# Patient Record
Sex: FEMALE | Race: BLACK OR AFRICAN AMERICAN | NOT HISPANIC OR LATINO
[De-identification: names, ages, dates, MRNs, and addresses within clinical notes are randomized per-mention and may not be internally consistent; named-entity substitution may affect disease eponyms.]

---

## 2020-06-24 ENCOUNTER — LABORATORY RESULT (OUTPATIENT)
Age: 75
End: 2020-06-24

## 2020-06-24 ENCOUNTER — APPOINTMENT (OUTPATIENT)
Dept: DERMATOLOGY | Facility: CLINIC | Age: 75
End: 2020-06-24
Payer: MEDICARE

## 2020-06-24 VITALS — BODY MASS INDEX: 24.99 KG/M2 | WEIGHT: 150 LBS | HEIGHT: 65 IN

## 2020-06-24 VITALS — TEMPERATURE: 97.5 F

## 2020-06-24 DIAGNOSIS — D48.5 NEOPLASM OF UNCERTAIN BEHAVIOR OF SKIN: ICD-10-CM

## 2020-06-24 PROBLEM — Z00.00 ENCOUNTER FOR PREVENTIVE HEALTH EXAMINATION: Status: ACTIVE | Noted: 2020-06-24

## 2020-06-24 PROCEDURE — 99203 OFFICE O/P NEW LOW 30 MIN: CPT | Mod: 25

## 2020-06-24 PROCEDURE — 11102 TANGNTL BX SKIN SINGLE LES: CPT

## 2020-07-01 LAB
ALBUMIN SERPL ELPH-MCNC: 4 G/DL
ALP BLD-CCNC: 119 U/L
ALT SERPL-CCNC: 61 U/L
ANION GAP SERPL CALC-SCNC: 12 MMOL/L
AST SERPL-CCNC: 110 U/L
BASOPHILS # BLD AUTO: 0.03 K/UL
BASOPHILS NFR BLD AUTO: 0.9 %
BILIRUB SERPL-MCNC: 1.2 MG/DL
BUN SERPL-MCNC: 9 MG/DL
CALCIUM SERPL-MCNC: 9.4 MG/DL
CHLORIDE SERPL-SCNC: 105 MMOL/L
CO2 SERPL-SCNC: 25 MMOL/L
CREAT SERPL-MCNC: 0.68 MG/DL
EOSINOPHIL # BLD AUTO: 0.03 K/UL
EOSINOPHIL NFR BLD AUTO: 0.9 %
GLUCOSE SERPL-MCNC: 108 MG/DL
HAV IGM SER QL: NONREACTIVE
HBV CORE IGM SER QL: NONREACTIVE
HBV SURFACE AG SER QL: NONREACTIVE
HCT VFR BLD CALC: 40.7 %
HCV AB SER QL: REACTIVE
HCV RNA SERPL NAA DL=5-ACNC: ABNORMAL IU/ML
HCV RNA SERPL NAA+PROBE-LOG IU: 5.82 LOG10IU/ML
HCV S/CO RATIO: 16.73 S/CO
HGB BLD-MCNC: 13 G/DL
HIV1+2 AB SPEC QL IA.RAPID: NONREACTIVE
IMM GRANULOCYTES NFR BLD AUTO: 0.6 %
LYMPHOCYTES # BLD AUTO: 1.25 K/UL
LYMPHOCYTES NFR BLD AUTO: 39.1 %
M TB IFN-G BLD-IMP: NEGATIVE
MAN DIFF?: NORMAL
MCHC RBC-ENTMCNC: 29.3 PG
MCHC RBC-ENTMCNC: 31.9 GM/DL
MCV RBC AUTO: 91.9 FL
MONOCYTES # BLD AUTO: 0.32 K/UL
MONOCYTES NFR BLD AUTO: 10 %
NEUTROPHILS # BLD AUTO: 1.55 K/UL
NEUTROPHILS NFR BLD AUTO: 48.5 %
PLATELET # BLD AUTO: 88 K/UL
POTASSIUM SERPL-SCNC: 4.3 MMOL/L
PROT SERPL-MCNC: 8.4 G/DL
QUANTIFERON TB PLUS MITOGEN MINUS NIL: 7.41 IU/ML
QUANTIFERON TB PLUS NIL: 0.03 IU/ML
QUANTIFERON TB PLUS TB1 MINUS NIL: 0.12 IU/ML
QUANTIFERON TB PLUS TB2 MINUS NIL: 0.12 IU/ML
RBC # BLD: 4.43 M/UL
RBC # FLD: 15.3 %
SODIUM SERPL-SCNC: 141 MMOL/L
WBC # FLD AUTO: 3.2 K/UL

## 2020-07-10 ENCOUNTER — APPOINTMENT (OUTPATIENT)
Dept: GASTROENTEROLOGY | Facility: CLINIC | Age: 75
End: 2020-07-10
Payer: MEDICARE

## 2020-07-10 VITALS
SYSTOLIC BLOOD PRESSURE: 149 MMHG | HEIGHT: 65 IN | WEIGHT: 156 LBS | TEMPERATURE: 98.3 F | DIASTOLIC BLOOD PRESSURE: 86 MMHG | BODY MASS INDEX: 25.99 KG/M2 | HEART RATE: 109 BPM

## 2020-07-10 DIAGNOSIS — E66.3 OVERWEIGHT: ICD-10-CM

## 2020-07-10 PROCEDURE — 82274 ASSAY TEST FOR BLOOD FECAL: CPT | Mod: QW

## 2020-07-10 PROCEDURE — 99204 OFFICE O/P NEW MOD 45 MIN: CPT

## 2020-07-10 NOTE — REVIEW OF SYSTEMS
[Itching] : itching [Negative] : Endocrine [As Noted in HPI] : as noted in HPI [Heartburn] : no heartburn
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
## 2020-07-10 NOTE — ASSESSMENT
[FreeTextEntry1] : 1.  Chronic hepatitis C with leg rashes (?leukocytoclastic vasculitis, cryoglobulinemia, etc.) and thrombocytopenia, hyperglobulinemia--I suspect underlying cirrhosis.  At increased risk for fatty liver, hepatocellular carcinoma.\par 2.  Rule out colorectal neoplasia; if cirrhosis, rule out varices.  Stool guaiac negative with negative fecal immunochemical test on today's exam.\par 3.  Overweight.\par 4.  Hypertension.\par 5.  Status post vaginal cyst excision.\par \par Plan:\par 1.  Medical records reviewed.\par 2.  Abdominal ultrasound.\par 3.  Formal evaluation by full-time Hepatology after ultrasound, for consideration of antiviral therapy, etc. Further bloodwork to be drawn by Hepatology, as needed.\par 4.  At some point, consider screening colonoscopy, and may require EGD as well (if cirrhosis is documented).\par 5.  If no evidence of cirrhosis, consider formal Hematology evaluation.

## 2020-07-10 NOTE — CONSULT LETTER
[Dear  ___] : Dear  [unfilled], [Consult Letter:] : I had the pleasure of evaluating your patient, [unfilled]. [Please see my note below.] : Please see my note below. [Sincerely,] : Sincerely, [Consult Closing:] : Thank you very much for allowing me to participate in the care of this patient.  If you have any questions, please do not hesitate to contact me. [FreeTextEntry3] : Deondre Chiang M.D.\par  [DrVa  ___] : Dr. HILLMAN

## 2020-07-10 NOTE — PHYSICAL EXAM
[General Appearance - In No Acute Distress] : in no acute distress [General Appearance - Alert] : alert [General Appearance - Well Nourished] : well nourished [General Appearance - Well Developed] : well developed [Sclera] : the sclera and conjunctiva were normal [Neck Appearance] : the appearance of the neck was normal [Thyroid Diffuse Enlargement] : the thyroid was not enlarged [Neck Cervical Mass (___cm)] : no neck mass was observed [Jugular Venous Distention Increased] : there was no jugular-venous distention [Thyroid Nodule] : there were no palpable thyroid nodules [Auscultation Breath Sounds / Voice Sounds] : lungs were clear to auscultation bilaterally [Heart Rate And Rhythm] : heart rate was normal and rhythm regular [Heart Sounds] : normal S1 and S2 [Heart Sounds Gallop] : no gallops [Heart Sounds Pericardial Friction Rub] : no pericardial rub [Full Pulse] : the pedal pulses are present [Edema] : there was no peripheral edema [Abdomen Tenderness] : non-tender [Bowel Sounds] : normal bowel sounds [Abdomen Soft] : soft [Normal Sphincter Tone] : normal sphincter tone [Abdomen Mass (___ Cm)] : no abdominal mass palpated [] : no hepato-splenomegaly [Internal Hemorrhoid] : no internal hemorrhoids [External Hemorrhoid] : no external hemorrhoids [No Rectal Mass] : no rectal mass [Occult Blood Positive] : stool was negative for occult blood [Axillary Lymph Nodes Enlarged Bilaterally] : axillary [Supraclavicular Lymph Nodes Enlarged Bilaterally] : supraclavicular [Cervical Lymph Nodes Enlarged Anterior Bilaterally] : anterior cervical [Cervical Lymph Nodes Enlarged Posterior Bilaterally] : posterior cervical [Nail Clubbing] : no clubbing  or cyanosis of the fingernails [Femoral Lymph Nodes Enlarged Bilaterally] : femoral [Inguinal Lymph Nodes Enlarged Bilaterally] : inguinal [Musculoskeletal - Swelling] : no joint swelling seen [FreeTextEntry1] : violaceous papules on left leg; bandage on right shin (raised edges) [Affect] : the affect was normal [Oriented To Time, Place, And Person] : oriented to person, place, and time [Impaired Insight] : insight and judgment were intact

## 2020-08-04 ENCOUNTER — APPOINTMENT (OUTPATIENT)
Dept: HEPATOLOGY | Facility: CLINIC | Age: 75
End: 2020-08-04
Payer: MEDICARE

## 2020-08-04 VITALS
TEMPERATURE: 97 F | SYSTOLIC BLOOD PRESSURE: 114 MMHG | HEIGHT: 65 IN | WEIGHT: 155 LBS | BODY MASS INDEX: 25.83 KG/M2 | HEART RATE: 130 BPM | DIASTOLIC BLOOD PRESSURE: 76 MMHG

## 2020-08-04 DIAGNOSIS — R21 RASH AND OTHER NONSPECIFIC SKIN ERUPTION: ICD-10-CM

## 2020-08-04 PROCEDURE — 99204 OFFICE O/P NEW MOD 45 MIN: CPT

## 2020-08-04 PROCEDURE — 99214 OFFICE O/P EST MOD 30 MIN: CPT

## 2020-08-06 LAB
ALBUMIN SERPL ELPH-MCNC: 4.2 G/DL
ALP BLD-CCNC: 116 U/L
ALT SERPL-CCNC: 46 U/L
ANION GAP SERPL CALC-SCNC: 12 MMOL/L
AST SERPL-CCNC: 83 U/L
BASOPHILS # BLD AUTO: 0.02 K/UL
BASOPHILS NFR BLD AUTO: 0.5 %
BILIRUB SERPL-MCNC: 1.4 MG/DL
BUN SERPL-MCNC: 8 MG/DL
CALCIUM SERPL-MCNC: 9.4 MG/DL
CHLORIDE SERPL-SCNC: 105 MMOL/L
CO2 SERPL-SCNC: 25 MMOL/L
CREAT SERPL-MCNC: 0.74 MG/DL
EOSINOPHIL # BLD AUTO: 0.03 K/UL
EOSINOPHIL NFR BLD AUTO: 0.7 %
GLUCOSE SERPL-MCNC: 102 MG/DL
HBV CORE IGG+IGM SER QL: NONREACTIVE
HBV SURFACE AB SER QL: NONREACTIVE
HBV SURFACE AG SER QL: NONREACTIVE
HCT VFR BLD CALC: 42.2 %
HEPATITIS A IGG ANTIBODY: REACTIVE
HGB BLD-MCNC: 13.2 G/DL
IMM GRANULOCYTES NFR BLD AUTO: 0.2 %
INR PPP: 1.28 RATIO
LYMPHOCYTES # BLD AUTO: 1.33 K/UL
LYMPHOCYTES NFR BLD AUTO: 32.1 %
MAN DIFF?: NORMAL
MCHC RBC-ENTMCNC: 29.4 PG
MCHC RBC-ENTMCNC: 31.3 GM/DL
MCV RBC AUTO: 94 FL
MONOCYTES # BLD AUTO: 0.33 K/UL
MONOCYTES NFR BLD AUTO: 8 %
NEUTROPHILS # BLD AUTO: 2.42 K/UL
NEUTROPHILS NFR BLD AUTO: 58.5 %
PLATELET # BLD AUTO: 100 K/UL
POTASSIUM SERPL-SCNC: 3.9 MMOL/L
PROT SERPL-MCNC: 8.7 G/DL
PT BLD: 14.9 SEC
RBC # BLD: 4.49 M/UL
RBC # FLD: 14.3 %
SODIUM SERPL-SCNC: 142 MMOL/L
WBC # FLD AUTO: 4.14 K/UL

## 2020-08-10 LAB
AFPL3 RESULTS RECEIVED: NORMAL
ALPHA-1-FETOPROTEIN-L3: 12.8 %
ALPHA-1-FETOPROTEIN: 102 NG/ML

## 2020-08-18 ENCOUNTER — OUTPATIENT (OUTPATIENT)
Dept: OUTPATIENT SERVICES | Facility: HOSPITAL | Age: 75
LOS: 1 days | End: 2020-08-18
Payer: MEDICARE

## 2020-08-18 ENCOUNTER — APPOINTMENT (OUTPATIENT)
Dept: MRI IMAGING | Facility: IMAGING CENTER | Age: 75
End: 2020-08-18
Payer: MEDICARE

## 2020-08-18 DIAGNOSIS — B19.20 UNSPECIFIED VIRAL HEPATITIS C WITHOUT HEPATIC COMA: ICD-10-CM

## 2020-08-18 PROCEDURE — 74183 MRI ABD W/O CNTR FLWD CNTR: CPT | Mod: 26

## 2020-08-18 PROCEDURE — A9585: CPT

## 2020-08-18 PROCEDURE — 74183 MRI ABD W/O CNTR FLWD CNTR: CPT

## 2020-08-19 ENCOUNTER — APPOINTMENT (OUTPATIENT)
Dept: MRI IMAGING | Facility: IMAGING CENTER | Age: 75
End: 2020-08-19

## 2020-08-20 LAB — HCV GENTYP BLD NAA+PROBE: NORMAL

## 2020-08-21 ENCOUNTER — OUTPATIENT (OUTPATIENT)
Dept: OUTPATIENT SERVICES | Facility: HOSPITAL | Age: 75
LOS: 1 days | End: 2020-08-21
Payer: MEDICARE

## 2020-08-21 ENCOUNTER — APPOINTMENT (OUTPATIENT)
Dept: ULTRASOUND IMAGING | Facility: IMAGING CENTER | Age: 75
End: 2020-08-21
Payer: MEDICARE

## 2020-08-21 DIAGNOSIS — R21 RASH AND OTHER NONSPECIFIC SKIN ERUPTION: ICD-10-CM

## 2020-08-21 PROCEDURE — 76700 US EXAM ABDOM COMPLETE: CPT

## 2020-08-21 PROCEDURE — 76700 US EXAM ABDOM COMPLETE: CPT | Mod: 26

## 2020-08-26 ENCOUNTER — APPOINTMENT (OUTPATIENT)
Dept: HEPATOLOGY | Facility: CLINIC | Age: 75
End: 2020-08-26
Payer: MEDICARE

## 2020-08-26 VITALS
HEIGHT: 65 IN | TEMPERATURE: 96.2 F | RESPIRATION RATE: 14 BRPM | BODY MASS INDEX: 25.83 KG/M2 | HEART RATE: 105 BPM | DIASTOLIC BLOOD PRESSURE: 83 MMHG | SYSTOLIC BLOOD PRESSURE: 134 MMHG | WEIGHT: 155 LBS

## 2020-08-26 PROCEDURE — 91200 LIVER ELASTOGRAPHY: CPT

## 2020-08-26 PROCEDURE — 99214 OFFICE O/P EST MOD 30 MIN: CPT

## 2020-08-26 NOTE — PHYSICAL EXAM
[General Appearance - Alert] : alert [General Appearance - In No Acute Distress] : in no acute distress [Sclera] : the sclera and conjunctiva were normal [PERRL With Normal Accommodation] : pupils were equal in size, round, and reactive to light [Extraocular Movements] : extraocular movements were intact [Outer Ear] : the ears and nose were normal in appearance [Oropharynx] : the oropharynx was normal [Neck Appearance] : the appearance of the neck was normal [Neck Cervical Mass (___cm)] : no neck mass was observed [Jugular Venous Distention Increased] : there was no jugular-venous distention [Thyroid Diffuse Enlargement] : the thyroid was not enlarged [Thyroid Nodule] : there were no palpable thyroid nodules [Heart Rate And Rhythm] : heart rate was normal and rhythm regular [Auscultation Breath Sounds / Voice Sounds] : lungs were clear to auscultation bilaterally [Heart Sounds] : normal S1 and S2 [Heart Sounds Gallop] : no gallops [Murmurs] : no murmurs [Heart Sounds Pericardial Friction Rub] : no pericardial rub [Abdomen Soft] : soft [Abdomen Tenderness] : non-tender [Bowel Sounds] : normal bowel sounds [] : no hepato-splenomegaly [No CVA Tenderness] : no ~M costovertebral angle tenderness [Abdomen Mass (___ Cm)] : no abdominal mass palpated [No Spinal Tenderness] : no spinal tenderness [Abnormal Walk] : normal gait [Nail Clubbing] : no clubbing  or cyanosis of the fingernails [Motor Tone] : muscle strength and tone were normal [Musculoskeletal - Swelling] : no joint swelling seen [No Focal Deficits] : no focal deficits [Deep Tendon Reflexes (DTR)] : deep tendon reflexes were 2+ and symmetric [Sensation] : the sensory exam was normal to light touch and pinprick [Impaired Insight] : insight and judgment were intact [Oriented To Time, Place, And Person] : oriented to person, place, and time [Affect] : the affect was normal [FreeTextEntry1] : Multiple lichenified skin rash in both lower and upper extremities
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
## 2020-08-26 NOTE — ASSESSMENT
[FreeTextEntry1] : 74 yrs old Female with chronic hepatitis C, and recent new onset skin rash in both lower and upper extremities ( ? PCT vs lichen planus). Fibroscan shows CAP score of 100, and median liver stiffness score of 19.9 suggesting cirrhosis of the liver. Her MRI and US both shows a  7.5 cm right hepatic lobe lesion compatible with with hepatocellular carcinoma (LI-RADS 5). Partially exophytic nodular extension of tumor versus smaller satellite lesions. \par \par \par PLAN\par \par I discussed the meaning of cirrhosis with the patient. I reviewed the natural history of the disease. I explained the risks for the development of esophageal varices with and without bleeding, hepatic encephalopathy, ascites, hepatocellular carcinoma, and liver failure. I have explained that disease can progress to the point of requiring an evaluation for liver transplantation. I have explained the need for imaging every 6 months to screen for liver cancer.\par \par I have discussed potential implication related to his diagnosis of HCC (hepatocellular carcinoma). I have reviewed the natural history of hepatocellular carcinoma. I have discussed potential therapeutic options for treatment of hepatocellular carcinoma including liver directed therapies [such as TACE ( transarterial chemoembolization), RFA ( radiofrequency ablation), TARE(transarterial radioembolization)], liver transplantation, as well as potential chemotherapeutic options. I have also explained that treatment options the patient specific, and some patient's not be a reasonable for particular therapeutic interventions. Additionally, if liver transplantation is considered, patient must fulfill certain criteria and be approved by the selection committee in order to be placed in the transplant list.\par \par At this point I will defer treatment of her HCV as she remains at high risk of relapse.\par \par I have referred to Dr. Ramirez to discuss liver targeted therapy option. However, I will prefer surgical resection.  I have discussed with Dr. Domo De Guzman, and he agrees to evaluate for potential surgical resection after an HVPHG measurement.  I will request Dr. Chung to have this scheduled. She will also see Dr. Nilda Carreno, Oncologist on 9/11/20. \par \par -Initiate HBV vaccine series. \par \par \par RTC in 4 weeks.

## 2020-08-26 NOTE — REVIEW OF SYSTEMS
[Negative] : Heme/Lymph [de-identified] : both lower and upper extremities [Skin Lesions] : no skin lesions

## 2020-08-28 ENCOUNTER — APPOINTMENT (OUTPATIENT)
Dept: INTERVENTIONAL RADIOLOGY/VASCULAR | Facility: CLINIC | Age: 75
End: 2020-08-28
Payer: MEDICARE

## 2020-08-28 DIAGNOSIS — Z63.4 DISAPPEARANCE AND DEATH OF FAMILY MEMBER: ICD-10-CM

## 2020-08-28 DIAGNOSIS — Z78.9 OTHER SPECIFIED HEALTH STATUS: ICD-10-CM

## 2020-08-28 PROCEDURE — 99204 OFFICE O/P NEW MOD 45 MIN: CPT | Mod: 95

## 2020-08-28 RX ORDER — AMLODIPINE BESYLATE 5 MG/1
5 TABLET ORAL DAILY
Refills: 0 | Status: ACTIVE | COMMUNITY

## 2020-08-28 RX ORDER — POLYETHYLENE GLYCOL 3350 AND ELECTROLYTES WITH LEMON FLAVOR 236; 22.74; 6.74; 5.86; 2.97 G/4L; G/4L; G/4L; G/4L; G/4L
236 POWDER, FOR SOLUTION ORAL
Qty: 1 | Refills: 0 | Status: DISCONTINUED | COMMUNITY
Start: 2020-08-26 | End: 2020-08-28

## 2020-08-28 RX ORDER — LISINOPRIL 10 MG/1
10 TABLET ORAL DAILY
Refills: 0 | Status: ACTIVE | COMMUNITY

## 2020-08-28 SDOH — SOCIAL STABILITY - SOCIAL INSECURITY: DISSAPEARANCE AND DEATH OF FAMILY MEMBER: Z63.4

## 2020-09-10 ENCOUNTER — OUTPATIENT (OUTPATIENT)
Dept: OUTPATIENT SERVICES | Facility: HOSPITAL | Age: 75
LOS: 1 days | Discharge: HOME | End: 2020-09-10

## 2020-09-10 DIAGNOSIS — C22.0 LIVER CELL CARCINOMA: ICD-10-CM

## 2020-09-11 ENCOUNTER — RESULT REVIEW (OUTPATIENT)
Age: 75
End: 2020-09-11

## 2020-09-11 ENCOUNTER — APPOINTMENT (OUTPATIENT)
Age: 75
End: 2020-09-11
Payer: MEDICARE

## 2020-09-11 VITALS
RESPIRATION RATE: 16 BRPM | DIASTOLIC BLOOD PRESSURE: 86 MMHG | WEIGHT: 154.96 LBS | BODY MASS INDEX: 26.46 KG/M2 | TEMPERATURE: 98.3 F | HEART RATE: 97 BPM | SYSTOLIC BLOOD PRESSURE: 133 MMHG | OXYGEN SATURATION: 100 % | HEIGHT: 64.33 IN

## 2020-09-11 DIAGNOSIS — I10 ESSENTIAL (PRIMARY) HYPERTENSION: ICD-10-CM

## 2020-09-11 LAB
BASOPHILS # BLD AUTO: 0.03 K/UL — SIGNIFICANT CHANGE UP (ref 0–0.2)
BASOPHILS NFR BLD AUTO: 0.7 % — SIGNIFICANT CHANGE UP (ref 0–2)
EOSINOPHIL # BLD AUTO: 0.05 K/UL — SIGNIFICANT CHANGE UP (ref 0–0.5)
EOSINOPHIL NFR BLD AUTO: 1.2 % — SIGNIFICANT CHANGE UP (ref 0–6)
HCT VFR BLD CALC: 40.7 % — SIGNIFICANT CHANGE UP (ref 34.5–45)
HGB BLD-MCNC: 13.4 G/DL — SIGNIFICANT CHANGE UP (ref 11.5–15.5)
IMM GRANULOCYTES NFR BLD AUTO: 0.2 % — SIGNIFICANT CHANGE UP (ref 0–1.5)
LYMPHOCYTES # BLD AUTO: 1.31 K/UL — SIGNIFICANT CHANGE UP (ref 1–3.3)
LYMPHOCYTES # BLD AUTO: 30.2 % — SIGNIFICANT CHANGE UP (ref 13–44)
MCHC RBC-ENTMCNC: 30 PG — SIGNIFICANT CHANGE UP (ref 27–34)
MCHC RBC-ENTMCNC: 32.9 G/DL — SIGNIFICANT CHANGE UP (ref 32–36)
MCV RBC AUTO: 91.1 FL — SIGNIFICANT CHANGE UP (ref 80–100)
MONOCYTES # BLD AUTO: 0.33 K/UL — SIGNIFICANT CHANGE UP (ref 0–0.9)
MONOCYTES NFR BLD AUTO: 7.6 % — SIGNIFICANT CHANGE UP (ref 2–14)
NEUTROPHILS # BLD AUTO: 2.61 K/UL — SIGNIFICANT CHANGE UP (ref 1.8–7.4)
NEUTROPHILS NFR BLD AUTO: 60.1 % — SIGNIFICANT CHANGE UP (ref 43–77)
NRBC # BLD: 0 /100 WBCS — SIGNIFICANT CHANGE UP (ref 0–0)
PLATELET # BLD AUTO: 116 K/UL — LOW (ref 150–400)
RBC # BLD: 4.47 M/UL — SIGNIFICANT CHANGE UP (ref 3.8–5.2)
RBC # FLD: 13.6 % — SIGNIFICANT CHANGE UP (ref 10.3–14.5)
WBC # BLD: 4.34 K/UL — SIGNIFICANT CHANGE UP (ref 3.8–10.5)
WBC # FLD AUTO: 4.34 K/UL — SIGNIFICANT CHANGE UP (ref 3.8–10.5)

## 2020-09-11 PROCEDURE — 99205 OFFICE O/P NEW HI 60 MIN: CPT

## 2020-09-12 LAB
AFP-TM SERPL-MCNC: 100 NG/ML
ALBUMIN SERPL ELPH-MCNC: 3.8 G/DL
ALP BLD-CCNC: 112 U/L
ALT SERPL-CCNC: 42 U/L
ANION GAP SERPL CALC-SCNC: 12 MMOL/L
APTT BLD: 36.2 SEC
AST SERPL-CCNC: 79 U/L
BILIRUB SERPL-MCNC: 1.3 MG/DL
BUN SERPL-MCNC: 8 MG/DL
CALCIUM SERPL-MCNC: 9.5 MG/DL
CHLORIDE SERPL-SCNC: 103 MMOL/L
CO2 SERPL-SCNC: 24 MMOL/L
CREAT SERPL-MCNC: 0.71 MG/DL
GLUCOSE SERPL-MCNC: 106 MG/DL
INR PPP: 1.19 RATIO
POTASSIUM SERPL-SCNC: 4.5 MMOL/L
PROT SERPL-MCNC: 8.8 G/DL
PT BLD: 13.9 SEC
SODIUM SERPL-SCNC: 139 MMOL/L

## 2020-09-16 ENCOUNTER — APPOINTMENT (OUTPATIENT)
Dept: DERMATOLOGY | Facility: CLINIC | Age: 75
End: 2020-09-16
Payer: MEDICARE

## 2020-09-16 DIAGNOSIS — L43.9 LICHEN PLANUS, UNSPECIFIED: ICD-10-CM

## 2020-09-16 DIAGNOSIS — L81.0 POSTINFLAMMATORY HYPERPIGMENTATION: ICD-10-CM

## 2020-09-16 PROCEDURE — 99214 OFFICE O/P EST MOD 30 MIN: CPT

## 2020-09-17 ENCOUNTER — APPOINTMENT (OUTPATIENT)
Dept: NUCLEAR MEDICINE | Facility: IMAGING CENTER | Age: 75
End: 2020-09-17
Payer: MEDICARE

## 2020-09-17 ENCOUNTER — APPOINTMENT (OUTPATIENT)
Dept: CT IMAGING | Facility: IMAGING CENTER | Age: 75
End: 2020-09-17
Payer: MEDICARE

## 2020-09-17 ENCOUNTER — RESULT REVIEW (OUTPATIENT)
Age: 75
End: 2020-09-17

## 2020-09-17 ENCOUNTER — OUTPATIENT (OUTPATIENT)
Dept: OUTPATIENT SERVICES | Facility: HOSPITAL | Age: 75
LOS: 1 days | End: 2020-09-17
Payer: MEDICARE

## 2020-09-17 DIAGNOSIS — Z00.8 ENCOUNTER FOR OTHER GENERAL EXAMINATION: ICD-10-CM

## 2020-09-17 DIAGNOSIS — C22.0 LIVER CELL CARCINOMA: ICD-10-CM

## 2020-09-17 PROBLEM — L81.0 POST-INFLAMMATORY HYPERPIGMENTATION: Status: ACTIVE | Noted: 2020-09-17

## 2020-09-17 PROCEDURE — 78830 RP LOCLZJ TUM SPECT W/CT 1: CPT | Mod: 26

## 2020-09-17 PROCEDURE — 71250 CT THORAX DX C-: CPT | Mod: 26

## 2020-09-17 PROCEDURE — 78830 RP LOCLZJ TUM SPECT W/CT 1: CPT

## 2020-09-17 PROCEDURE — 78306 BONE IMAGING WHOLE BODY: CPT | Mod: 26

## 2020-09-17 PROCEDURE — 78306 BONE IMAGING WHOLE BODY: CPT

## 2020-09-17 PROCEDURE — 71250 CT THORAX DX C-: CPT

## 2020-09-17 PROCEDURE — A9561: CPT

## 2020-09-26 DIAGNOSIS — Z01.818 ENCOUNTER FOR OTHER PREPROCEDURAL EXAMINATION: ICD-10-CM

## 2020-09-28 ENCOUNTER — APPOINTMENT (OUTPATIENT)
Dept: DISASTER EMERGENCY | Facility: CLINIC | Age: 75
End: 2020-09-28

## 2020-09-28 DIAGNOSIS — Z01.818 ENCOUNTER FOR OTHER PREPROCEDURAL EXAMINATION: ICD-10-CM

## 2020-09-29 LAB — SARS-COV-2 N GENE NPH QL NAA+PROBE: NOT DETECTED

## 2020-09-30 ENCOUNTER — APPOINTMENT (OUTPATIENT)
Dept: HEPATOLOGY | Facility: HOSPITAL | Age: 75
End: 2020-09-30

## 2020-09-30 ENCOUNTER — OUTPATIENT (OUTPATIENT)
Dept: OUTPATIENT SERVICES | Facility: HOSPITAL | Age: 75
LOS: 1 days | End: 2020-09-30
Payer: MEDICARE

## 2020-09-30 ENCOUNTER — RESULT REVIEW (OUTPATIENT)
Age: 75
End: 2020-09-30

## 2020-09-30 ENCOUNTER — RECORD ABSTRACTING (OUTPATIENT)
Age: 75
End: 2020-09-30

## 2020-09-30 VITALS
RESPIRATION RATE: 16 BRPM | WEIGHT: 156.97 LBS | OXYGEN SATURATION: 99 % | HEIGHT: 64 IN | SYSTOLIC BLOOD PRESSURE: 121 MMHG | TEMPERATURE: 96 F | HEART RATE: 94 BPM | DIASTOLIC BLOOD PRESSURE: 76 MMHG

## 2020-09-30 VITALS
DIASTOLIC BLOOD PRESSURE: 68 MMHG | HEART RATE: 90 BPM | OXYGEN SATURATION: 97 % | SYSTOLIC BLOOD PRESSURE: 116 MMHG | RESPIRATION RATE: 18 BRPM

## 2020-09-30 DIAGNOSIS — N89.8 OTHER SPECIFIED NONINFLAMMATORY DISORDERS OF VAGINA: Chronic | ICD-10-CM

## 2020-09-30 DIAGNOSIS — K74.60 UNSPECIFIED CIRRHOSIS OF LIVER: ICD-10-CM

## 2020-09-30 PROCEDURE — 43239 EGD BIOPSY SINGLE/MULTIPLE: CPT

## 2020-09-30 PROCEDURE — G0121 COLON CA SCRN NOT HI RSK IND: CPT

## 2020-09-30 PROCEDURE — G0121: CPT

## 2020-09-30 RX ORDER — SODIUM CHLORIDE 9 MG/ML
1000 INJECTION INTRAMUSCULAR; INTRAVENOUS; SUBCUTANEOUS
Refills: 0 | Status: DISCONTINUED | OUTPATIENT
Start: 2020-09-30 | End: 2020-10-14

## 2020-09-30 RX ADMIN — SODIUM CHLORIDE 30 MILLILITER(S): 9 INJECTION INTRAMUSCULAR; INTRAVENOUS; SUBCUTANEOUS at 08:08

## 2020-09-30 NOTE — PRE PROCEDURE NOTE - PRE PROCEDURE EVALUATION
Attending Physician:          Dr. Gaytan             Procedure: EGD/Colon    Indication for Procedure: ...   ________________________________________________________  PAST MEDICAL & SURGICAL HISTORY:  Hypertension, unspecified type    Vaginal cyst      ALLERGIES:  No Known Allergies    HOME MEDICATIONS:  amLODIPine: 20 milligram(s) orally once a day  lisinopril 10 mg oral tablet: 1 tab(s) orally once a day    AICD/PPM: [ ] yes   [ ] no    PERTINENT LAB DATA:                      PHYSICAL EXAMINATION:    Height (cm): 162.6  Weight (kg): 71.2  BMI (kg/m2): 26.9  BSA (m2): 1.76T(C): --  HR: --  BP: --  RR: --  SpO2: --    Constitutional: NAD  HEENT: PERRLA, EOMI,    Neck:  No JVD  Respiratory: CTAB/L  Cardiovascular: S1 and S2  Gastrointestinal: BS+, soft, NT/ND  Extremities: No peripheral edema  Neurological: A/O x 3, no focal deficits  Psychiatric: Normal mood, normal affect  Skin: No rashes    ASA Class: I [ ]  II [x ]  III [ ]  IV [ ]    COMMENTS:    The patient is a suitable candidate for the planned procedure unless box checked [ ]  No, explain:     Attending Physician:          Dr. Gaytan             Procedure: EGD/Colon    Indication for Procedure: Colorectal screening, variceal screening  ________________________________________________________  PAST MEDICAL & SURGICAL HISTORY:  Hypertension, unspecified type    Vaginal cyst      ALLERGIES:  No Known Allergies    HOME MEDICATIONS:  amLODIPine: 20 milligram(s) orally once a day  lisinopril 10 mg oral tablet: 1 tab(s) orally once a day    AICD/PPM: [ ] yes   [ ] no    PERTINENT LAB DATA:                      PHYSICAL EXAMINATION:    Height (cm): 162.6  Weight (kg): 71.2  BMI (kg/m2): 26.9  BSA (m2): 1.76T(C): --  HR: --  BP: --  RR: --  SpO2: --    Constitutional: NAD  HEENT: PERRLA, EOMI,    Neck:  No JVD  Respiratory: CTAB/L  Cardiovascular: S1 and S2  Gastrointestinal: BS+, soft, NT/ND  Extremities: No peripheral edema  Neurological: A/O x 3, no focal deficits  Psychiatric: Normal mood, normal affect  Skin: No rashes    ASA Class: I [ ]  II [x ]  III [ ]  IV [ ]    COMMENTS:    The patient is a suitable candidate for the planned procedure unless box checked [ ]  No, explain:

## 2020-09-30 NOTE — ASU PATIENT PROFILE, ADULT - PMH
Hepatic cirrhosis, unspecified hepatic cirrhosis type, unspecified whether ascites present    Hypertension, unspecified type    Thrombocytopenia

## 2020-09-30 NOTE — ASU DISCHARGE PLAN (ADULT/PEDIATRIC) - CARE PROVIDER_API CALL
Lauryn Gaytan (MD)  Gastroenterology; Transplant Hepatology  [address] [place] [address]  Phone: [phone]  Fax: [phone]  Established Patient  Follow Up Time: Routine

## 2020-10-01 PROBLEM — Z01.818 PREOP TESTING: Status: ACTIVE | Noted: 2020-10-01

## 2020-10-03 NOTE — HISTORY OF PRESENT ILLNESS
[Disease: _____________________] : Disease: [unfilled] [de-identified] : 74 F w/ Hepatitis C, cirrhosis presents for management of HCC. \par \par In June 2020, pt developed a rash and sought derm evaluation. Skin bx c/w lichen planus and blood work showed active Hepatitis C infection. Referred to hepatology and further testing revealed an elevated AFP. MRI Abdomen showed a 7.5 liver tumor in segment 6/7 of R lobe and smaller satellite lesions vs extension of the tumor. Pt referred to IR and medical oncology for further evaluation. \par \par \par \par  [de-identified] : n/a [de-identified] :  [de-identified] : appetite is generally good\par + dyspepsia  - takes Prilosec occasionally which helps \par weight has been stable \par

## 2020-10-05 ENCOUNTER — APPOINTMENT (OUTPATIENT)
Dept: HEPATOLOGY | Facility: CLINIC | Age: 75
End: 2020-10-05
Payer: MEDICARE

## 2020-10-05 VITALS
HEART RATE: 105 BPM | TEMPERATURE: 97.9 F | DIASTOLIC BLOOD PRESSURE: 82 MMHG | HEIGHT: 64.33 IN | BODY MASS INDEX: 26.12 KG/M2 | WEIGHT: 153 LBS | RESPIRATION RATE: 16 BRPM | SYSTOLIC BLOOD PRESSURE: 120 MMHG

## 2020-10-05 PROCEDURE — 99214 OFFICE O/P EST MOD 30 MIN: CPT

## 2020-10-06 ENCOUNTER — OUTPATIENT (OUTPATIENT)
Dept: OUTPATIENT SERVICES | Facility: HOSPITAL | Age: 75
LOS: 1 days | Discharge: HOME | End: 2020-10-06

## 2020-10-06 DIAGNOSIS — N89.8 OTHER SPECIFIED NONINFLAMMATORY DISORDERS OF VAGINA: Chronic | ICD-10-CM

## 2020-10-06 DIAGNOSIS — C22.0 LIVER CELL CARCINOMA: ICD-10-CM

## 2020-10-06 PROBLEM — Z00.00 ENCOUNTER FOR PREVENTIVE HEALTH EXAMINATION: Noted: 2020-10-06

## 2020-10-06 LAB
ALBUMIN SERPL ELPH-MCNC: 4 G/DL
ALP BLD-CCNC: 117 U/L
ALT SERPL-CCNC: 60 U/L
ANION GAP SERPL CALC-SCNC: 11 MMOL/L
AST SERPL-CCNC: 111 U/L
BASOPHILS # BLD AUTO: 0.04 K/UL
BASOPHILS NFR BLD AUTO: 1 %
BILIRUB SERPL-MCNC: 0.9 MG/DL
BUN SERPL-MCNC: 8 MG/DL
CALCIUM SERPL-MCNC: 9.6 MG/DL
CHLORIDE SERPL-SCNC: 105 MMOL/L
CO2 SERPL-SCNC: 26 MMOL/L
CREAT SERPL-MCNC: 0.72 MG/DL
EOSINOPHIL # BLD AUTO: 0.1 K/UL
EOSINOPHIL NFR BLD AUTO: 2.4 %
GLUCOSE SERPL-MCNC: 90 MG/DL
HCT VFR BLD CALC: 43.5 %
HGB BLD-MCNC: 13.7 G/DL
IMM GRANULOCYTES NFR BLD AUTO: 0.2 %
INR PPP: 1.28 RATIO
LYMPHOCYTES # BLD AUTO: 1.58 K/UL
LYMPHOCYTES NFR BLD AUTO: 37.5 %
MAN DIFF?: NORMAL
MCHC RBC-ENTMCNC: 30 PG
MCHC RBC-ENTMCNC: 31.5 GM/DL
MCV RBC AUTO: 95.4 FL
MONOCYTES # BLD AUTO: 0.35 K/UL
MONOCYTES NFR BLD AUTO: 8.3 %
NEUTROPHILS # BLD AUTO: 2.13 K/UL
NEUTROPHILS NFR BLD AUTO: 50.6 %
PLATELET # BLD AUTO: 117 K/UL
POTASSIUM SERPL-SCNC: 4.7 MMOL/L
PROT SERPL-MCNC: 8.6 G/DL
PT BLD: 14.9 SEC
RBC # BLD: 4.56 M/UL
RBC # FLD: 14.6 %
SODIUM SERPL-SCNC: 142 MMOL/L
WBC # FLD AUTO: 4.21 K/UL

## 2020-10-07 PROBLEM — D69.6 THROMBOCYTOPENIA, UNSPECIFIED: Chronic | Status: ACTIVE | Noted: 2020-09-30

## 2020-10-07 PROBLEM — I10 ESSENTIAL (PRIMARY) HYPERTENSION: Chronic | Status: ACTIVE | Noted: 2020-09-30

## 2020-10-07 PROBLEM — K74.60 UNSPECIFIED CIRRHOSIS OF LIVER: Chronic | Status: ACTIVE | Noted: 2020-09-30

## 2020-10-08 ENCOUNTER — APPOINTMENT (OUTPATIENT)
Dept: INTERVENTIONAL RADIOLOGY/VASCULAR | Facility: CLINIC | Age: 75
End: 2020-10-08
Payer: MEDICARE

## 2020-10-08 PROCEDURE — 99443: CPT

## 2020-10-08 PROCEDURE — XXXXX: CPT

## 2020-10-09 ENCOUNTER — APPOINTMENT (OUTPATIENT)
Age: 75
End: 2020-10-09
Payer: MEDICARE

## 2020-10-09 VITALS
RESPIRATION RATE: 16 BRPM | BODY MASS INDEX: 27.28 KG/M2 | SYSTOLIC BLOOD PRESSURE: 116 MMHG | TEMPERATURE: 97 F | WEIGHT: 155.87 LBS | DIASTOLIC BLOOD PRESSURE: 73 MMHG | HEIGHT: 63.58 IN | OXYGEN SATURATION: 100 % | HEART RATE: 95 BPM

## 2020-10-09 PROCEDURE — 99213 OFFICE O/P EST LOW 20 MIN: CPT

## 2020-10-12 LAB
AFPL3 RESULTS RECEIVED: NORMAL
ALPHA-1-FETOPROTEIN-L3: 16.7 %
ALPHA-1-FETOPROTEIN: 91.8 NG/ML

## 2020-10-16 NOTE — ASU PREOP CHECKLIST - TEMPERATURE IN FAHRENHEIT (DEGREES F)
Returned call to Moreno at [facility] and informed him that the request for pt's medical records has been received and forwarded to Medical Records for completion.  Moreno verbalized understanding.    ----- Message from Eve Holly sent at 10/15/2020  5:58 PM CDT -----  Regarding: Patient advice  Contact: Moreno Mayer from [facility] called in regards to cardiac form for pt       Call back [phone] ext [phone]      
96.4

## 2020-10-22 ENCOUNTER — APPOINTMENT (OUTPATIENT)
Dept: HEPATOLOGY | Facility: CLINIC | Age: 75
End: 2020-10-22
Payer: MEDICARE

## 2020-10-22 VITALS
BODY MASS INDEX: 27.11 KG/M2 | RESPIRATION RATE: 16 BRPM | WEIGHT: 153 LBS | HEART RATE: 103 BPM | TEMPERATURE: 97.9 F | SYSTOLIC BLOOD PRESSURE: 132 MMHG | HEIGHT: 63 IN | DIASTOLIC BLOOD PRESSURE: 78 MMHG

## 2020-10-22 PROCEDURE — 99214 OFFICE O/P EST MOD 30 MIN: CPT | Mod: 25

## 2020-10-22 PROCEDURE — 99072 ADDL SUPL MATRL&STAF TM PHE: CPT

## 2020-10-25 ENCOUNTER — OUTPATIENT (OUTPATIENT)
Dept: OUTPATIENT SERVICES | Facility: HOSPITAL | Age: 75
LOS: 1 days | End: 2020-10-25
Payer: MEDICARE

## 2020-10-25 DIAGNOSIS — N89.8 OTHER SPECIFIED NONINFLAMMATORY DISORDERS OF VAGINA: Chronic | ICD-10-CM

## 2020-10-25 DIAGNOSIS — Z11.59 ENCOUNTER FOR SCREENING FOR OTHER VIRAL DISEASES: ICD-10-CM

## 2020-10-25 LAB — SARS-COV-2 RNA SPEC QL NAA+PROBE: SIGNIFICANT CHANGE UP

## 2020-10-25 PROCEDURE — U0003: CPT

## 2020-10-28 ENCOUNTER — OUTPATIENT (OUTPATIENT)
Dept: OUTPATIENT SERVICES | Facility: HOSPITAL | Age: 75
LOS: 1 days | End: 2020-10-28
Payer: MEDICARE

## 2020-10-28 ENCOUNTER — RESULT REVIEW (OUTPATIENT)
Age: 75
End: 2020-10-28

## 2020-10-28 ENCOUNTER — APPOINTMENT (OUTPATIENT)
Dept: NUCLEAR MEDICINE | Facility: HOSPITAL | Age: 75
End: 2020-10-28

## 2020-10-28 VITALS
WEIGHT: 162.92 LBS | RESPIRATION RATE: 16 BRPM | HEIGHT: 65 IN | OXYGEN SATURATION: 99 % | HEART RATE: 116 BPM | SYSTOLIC BLOOD PRESSURE: 123 MMHG | DIASTOLIC BLOOD PRESSURE: 82 MMHG | TEMPERATURE: 98 F

## 2020-10-28 VITALS
HEIGHT: 65 IN | RESPIRATION RATE: 16 BRPM | OXYGEN SATURATION: 99 % | HEART RATE: 116 BPM | WEIGHT: 162.92 LBS | SYSTOLIC BLOOD PRESSURE: 123 MMHG | TEMPERATURE: 98 F | DIASTOLIC BLOOD PRESSURE: 82 MMHG

## 2020-10-28 DIAGNOSIS — C22.0 LIVER CELL CARCINOMA: ICD-10-CM

## 2020-10-28 DIAGNOSIS — N89.8 OTHER SPECIFIED NONINFLAMMATORY DISORDERS OF VAGINA: Chronic | ICD-10-CM

## 2020-10-28 LAB
BLD GP AB SCN SERPL QL: NEGATIVE — SIGNIFICANT CHANGE UP
RH IG SCN BLD-IMP: POSITIVE — SIGNIFICANT CHANGE UP

## 2020-10-28 PROCEDURE — 36011 PLACE CATHETER IN VEIN: CPT

## 2020-10-28 PROCEDURE — 88307 TISSUE EXAM BY PATHOLOGIST: CPT

## 2020-10-28 PROCEDURE — 78803 RP LOCLZJ TUM SPECT 1 AREA: CPT

## 2020-10-28 PROCEDURE — C1751: CPT

## 2020-10-28 PROCEDURE — 88313 SPECIAL STAINS GROUP 2: CPT | Mod: 26

## 2020-10-28 PROCEDURE — 36245 INS CATH ABD/L-EXT ART 1ST: CPT | Mod: XS

## 2020-10-28 PROCEDURE — 75970 VASCULAR BIOPSY: CPT | Mod: 26

## 2020-10-28 PROCEDURE — 76380 CAT SCAN FOLLOW-UP STUDY: CPT

## 2020-10-28 PROCEDURE — 75726 ARTERY X-RAYS ABDOMEN: CPT

## 2020-10-28 PROCEDURE — 36247 INS CATH ABD/L-EXT ART 3RD: CPT

## 2020-10-28 PROCEDURE — 86900 BLOOD TYPING SEROLOGIC ABO: CPT

## 2020-10-28 PROCEDURE — 76937 US GUIDE VASCULAR ACCESS: CPT | Mod: 26

## 2020-10-28 PROCEDURE — C1894: CPT

## 2020-10-28 PROCEDURE — C1769: CPT

## 2020-10-28 PROCEDURE — 37200 TRANSCATHETER BIOPSY: CPT

## 2020-10-28 PROCEDURE — 75774 ARTERY X-RAY EACH VESSEL: CPT | Mod: 26,59

## 2020-10-28 PROCEDURE — 86901 BLOOD TYPING SEROLOGIC RH(D): CPT

## 2020-10-28 PROCEDURE — A9540: CPT

## 2020-10-28 PROCEDURE — 75774 ARTERY X-RAY EACH VESSEL: CPT

## 2020-10-28 PROCEDURE — 76937 US GUIDE VASCULAR ACCESS: CPT

## 2020-10-28 PROCEDURE — 75726 ARTERY X-RAYS ABDOMEN: CPT | Mod: 26,59

## 2020-10-28 PROCEDURE — 86850 RBC ANTIBODY SCREEN: CPT

## 2020-10-28 PROCEDURE — 75970 VASCULAR BIOPSY: CPT

## 2020-10-28 PROCEDURE — 88307 TISSUE EXAM BY PATHOLOGIST: CPT | Mod: 26

## 2020-10-28 PROCEDURE — 78803 RP LOCLZJ TUM SPECT 1 AREA: CPT | Mod: 26

## 2020-10-28 PROCEDURE — 76380 CAT SCAN FOLLOW-UP STUDY: CPT | Mod: 26,59

## 2020-10-28 PROCEDURE — C1887: CPT

## 2020-10-28 PROCEDURE — 88313 SPECIAL STAINS GROUP 2: CPT

## 2020-10-28 RX ORDER — SODIUM CHLORIDE 9 MG/ML
1000 INJECTION, SOLUTION INTRAVENOUS
Refills: 0 | Status: DISCONTINUED | OUTPATIENT
Start: 2020-10-28 | End: 2020-11-11

## 2020-10-28 NOTE — PROCEDURE NOTE - PROCEDURE FINDINGS AND DETAILS
-wedge PV pressure 20, Free HV pressure 9, gradient of 11. RA pressure 8  -tumor vascularity supplied by the right hepatic artery, the left hepatic artery arises from a gastrohepatic trunk  -full report to follow

## 2020-10-28 NOTE — PRE PROCEDURE NOTE - GENERAL PROCEDURE NAME
liver biopsy, visceral angiogram, hjepatic arteriogram with possible embolization  / injection of MAA for nuclear imaging

## 2020-10-28 NOTE — PRE PROCEDURE NOTE - PRE PROCEDURE EVALUATION
Interventional Radiology  Pre-Procedure Note    This is a 74y  Female      HPI:  This is a 74 year old female with hx of HTN, HCV with hepatic mass with imaging characteristics of HCC on MRI who presents to IR for liver bx, visceral angiogram, hepatic arteriogram, possible embolization / injection of MAA for nuclear imaging for possible SIRT.        PAST MEDICAL & SURGICAL HISTORY:  Hepatic cirrhosis, unspecified hepatic cirrhosis type, unspecified whether ascites present    Thrombocytopenia    Hypertension, unspecified type    Vaginal cyst        Social History: never a smoker, denies alcohol or illicit drug use, lives with family    FAMILY HISTORY:      Allergies: No Known Allergies      Current Medications:   Home Medications:  amLODIPine: 20 milligram(s) orally once a day (28 Oct 2020 07:25)  lisinopril 10 mg oral tablet: 1 tab(s) orally once a day (28 Oct 2020 07:25)      Labs:     covid negative on 10/25/20    10/5/20 CBC, CMP, INR reviewed - wnl - results on chart          Blood Bank: stat sent this am  Blood Available Until: results pending    Assessment/Plan:   This is a 74 year old Female who presents with hx of HCV with hepatic lesions  Patient presents to IR for liver bx, visceral angiogram, hepatic arteriogram, possible embolization / injection of MAA for nuclear imaging.  NPO since before midnight 10/27  NKDA/NKA  Pt A & O x 3. Procedure/ risks/ benefits/ goals/ alternatives were explained. All questions answered. Informed content obtained from patient. Consent placed in chart.     Sammi Augustin [facility]  ext [phone]  # [phone]       Interventional Radiology  Pre-Procedure Note    This is a 74y  Female  HCC    HPI:  This is a 74 year old female with hx of HTN, HCV with hepatic mass with imaging characteristics of HCC on MRI who presents to IR for liver bx, visceral angiogram, hepatic arteriogram, possible embolization / injection of MAA for nuclear imaging for possible SIRT.    PAST MEDICAL & SURGICAL HISTORY:  Hepatic cirrhosis, unspecified hepatic cirrhosis type, unspecified whether ascites present    Thrombocytopenia    Hypertension, unspecified type    Vaginal cyst    Social History: never a smoker, denies alcohol or illicit drug use, lives with family    FAMILY HISTORY:    Allergies: No Known Allergies      Current Medications:   Home Medications:  amLODIPine: 20 milligram(s) orally once a day (28 Oct 2020 07:25)  lisinopril 10 mg oral tablet: 1 tab(s) orally once a day (28 Oct 2020 07:25)    Labs:     covid negative on 10/25/20    10/5/20 CBC, CMP, INR reviewed - wnl - results on chart    Blood Bank: stat sent this am  Blood Available Until: results pending    Assessment/Plan:   This is a 74 year old Female who presents with hx of HCV with 7 cm right post hepatic lobe HCC.  Patient presents to IR for liver bx, HVPG, visceral angiogram, hepatic arteriogram, possible embolization / injection of MAA for nuclear imaging.  NPO since before midnight 10/27  NKDA/NKA  Pt A & O x 3. Procedure/ risks/ benefits/ goals/ alternatives were explained. All questions answered. Informed content obtained from patient. Consent placed in chart.     Sammi Augustin [facility]  ext [phone]  # [phone]

## 2020-10-29 ENCOUNTER — NON-APPOINTMENT (OUTPATIENT)
Age: 75
End: 2020-10-29

## 2020-10-29 ENCOUNTER — APPOINTMENT (OUTPATIENT)
Dept: INTERVENTIONAL RADIOLOGY/VASCULAR | Facility: CLINIC | Age: 75
End: 2020-10-29
Payer: MEDICARE

## 2020-10-29 PROCEDURE — 99204 OFFICE O/P NEW MOD 45 MIN: CPT | Mod: 95

## 2020-10-29 NOTE — PHYSICAL EXAM
[Tender] : nontender [Enlarged] : not enlarged [de-identified] : looks younger than age [de-identified] : normal exam

## 2020-10-29 NOTE — ASSESSMENT
[FreeTextEntry1] : Patient counselled regarding treatment options for large right sided HCC.\par Definitive treatment is surgical resection but advanced age, tumour size, underlying cirrhosis are relative contraindications. \par Radioembolization is better tolerated but long-term use as definitive treatment is uncertain.\par \par
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
## 2020-10-29 NOTE — PLAN
[FreeTextEntry1] : \par Discussed case with Ashley Dotson Satapathy.\par \par Based on above factors, the plan is to proceed with radioembolization with reassessment after 2 months.\par If tumour is smaller with associated left lobe hypertrophy, surgical resection conditions are improved.\par \par For repeat CT liver next week prior to embolization to establish baseline pre-embolization

## 2020-11-01 ENCOUNTER — OUTPATIENT (OUTPATIENT)
Dept: OUTPATIENT SERVICES | Facility: HOSPITAL | Age: 75
LOS: 1 days | End: 2020-11-01
Payer: MEDICARE

## 2020-11-01 DIAGNOSIS — Z11.59 ENCOUNTER FOR SCREENING FOR OTHER VIRAL DISEASES: ICD-10-CM

## 2020-11-01 DIAGNOSIS — N89.8 OTHER SPECIFIED NONINFLAMMATORY DISORDERS OF VAGINA: Chronic | ICD-10-CM

## 2020-11-01 LAB — SARS-COV-2 RNA SPEC QL NAA+PROBE: SIGNIFICANT CHANGE UP

## 2020-11-01 PROCEDURE — U0003: CPT

## 2020-11-03 ENCOUNTER — APPOINTMENT (OUTPATIENT)
Dept: CT IMAGING | Facility: IMAGING CENTER | Age: 75
End: 2020-11-03
Payer: MEDICARE

## 2020-11-03 ENCOUNTER — OUTPATIENT (OUTPATIENT)
Dept: OUTPATIENT SERVICES | Facility: HOSPITAL | Age: 75
LOS: 1 days | End: 2020-11-03
Payer: MEDICARE

## 2020-11-03 ENCOUNTER — RESULT REVIEW (OUTPATIENT)
Age: 75
End: 2020-11-03

## 2020-11-03 DIAGNOSIS — N89.8 OTHER SPECIFIED NONINFLAMMATORY DISORDERS OF VAGINA: Chronic | ICD-10-CM

## 2020-11-03 DIAGNOSIS — Z00.8 ENCOUNTER FOR OTHER GENERAL EXAMINATION: ICD-10-CM

## 2020-11-03 PROCEDURE — 74160 CT ABDOMEN W/CONTRAST: CPT | Mod: 26

## 2020-11-03 NOTE — HISTORY OF PRESENT ILLNESS
[Disease: _____________________] : Disease: [unfilled] [de-identified] : 74 F w/ Hepatitis C, cirrhosis presents for management of HCC. \par \par In June 2020, pt developed a rash and sought derm evaluation. Skin bx c/w lichen planus and blood work showed active Hepatitis C infection. Referred to hepatology and further testing revealed an elevated AFP. MRI Abdomen showed a 7.5 liver tumor in segment 6/7 of R lobe and smaller satellite lesions vs extension of the tumor. Pt referred to IR and medical oncology for further evaluation. \par \par 9/17/20: CT Chest: CHRISTOS, bone scan: neg\par  [de-identified] : n/a [de-identified] :  [de-identified] : Overall feels well. Denies any c/o. we reviewed results of scans.

## 2020-11-04 ENCOUNTER — OUTPATIENT (OUTPATIENT)
Dept: OUTPATIENT SERVICES | Facility: HOSPITAL | Age: 75
LOS: 1 days | End: 2020-11-04
Payer: MEDICARE

## 2020-11-04 ENCOUNTER — RESULT REVIEW (OUTPATIENT)
Age: 75
End: 2020-11-04

## 2020-11-04 VITALS
HEIGHT: 65 IN | WEIGHT: 160.06 LBS | DIASTOLIC BLOOD PRESSURE: 75 MMHG | RESPIRATION RATE: 18 BRPM | OXYGEN SATURATION: 98 % | SYSTOLIC BLOOD PRESSURE: 113 MMHG | TEMPERATURE: 98 F | HEART RATE: 113 BPM

## 2020-11-04 VITALS
OXYGEN SATURATION: 100 % | HEART RATE: 89 BPM | SYSTOLIC BLOOD PRESSURE: 126 MMHG | DIASTOLIC BLOOD PRESSURE: 84 MMHG | RESPIRATION RATE: 20 BRPM | TEMPERATURE: 98 F

## 2020-11-04 DIAGNOSIS — C22.0 LIVER CELL CARCINOMA: ICD-10-CM

## 2020-11-04 DIAGNOSIS — N89.8 OTHER SPECIFIED NONINFLAMMATORY DISORDERS OF VAGINA: Chronic | ICD-10-CM

## 2020-11-04 LAB
ALBUMIN SERPL ELPH-MCNC: 4 G/DL — SIGNIFICANT CHANGE UP (ref 3.3–5)
ALP SERPL-CCNC: 103 U/L — SIGNIFICANT CHANGE UP (ref 40–120)
ALT FLD-CCNC: 56 U/L — HIGH (ref 10–45)
ANION GAP SERPL CALC-SCNC: 12 MMOL/L — SIGNIFICANT CHANGE UP (ref 5–17)
APTT BLD: 34.9 SEC — SIGNIFICANT CHANGE UP (ref 27.5–35.5)
AST SERPL-CCNC: 104 U/L — HIGH (ref 10–40)
BILIRUB SERPL-MCNC: 1.4 MG/DL — HIGH (ref 0.2–1.2)
BLD GP AB SCN SERPL QL: NEGATIVE — SIGNIFICANT CHANGE UP
BUN SERPL-MCNC: 8 MG/DL — SIGNIFICANT CHANGE UP (ref 7–23)
CALCIUM SERPL-MCNC: 9.5 MG/DL — SIGNIFICANT CHANGE UP (ref 8.4–10.5)
CHLORIDE SERPL-SCNC: 103 MMOL/L — SIGNIFICANT CHANGE UP (ref 96–108)
CO2 SERPL-SCNC: 24 MMOL/L — SIGNIFICANT CHANGE UP (ref 22–31)
CREAT SERPL-MCNC: 0.84 MG/DL — SIGNIFICANT CHANGE UP (ref 0.5–1.3)
GLUCOSE SERPL-MCNC: 114 MG/DL — HIGH (ref 70–99)
HCT VFR BLD CALC: 39.1 % — SIGNIFICANT CHANGE UP (ref 34.5–45)
HGB BLD-MCNC: 13 G/DL — SIGNIFICANT CHANGE UP (ref 11.5–15.5)
INR BLD: 1.27 RATIO — HIGH (ref 0.88–1.16)
MCHC RBC-ENTMCNC: 30 PG — SIGNIFICANT CHANGE UP (ref 27–34)
MCHC RBC-ENTMCNC: 33.2 GM/DL — SIGNIFICANT CHANGE UP (ref 32–36)
MCV RBC AUTO: 90.1 FL — SIGNIFICANT CHANGE UP (ref 80–100)
NRBC # BLD: 0 /100 WBCS — SIGNIFICANT CHANGE UP (ref 0–0)
PLATELET # BLD AUTO: 132 K/UL — LOW (ref 150–400)
POTASSIUM SERPL-MCNC: 3.2 MMOL/L — LOW (ref 3.5–5.3)
POTASSIUM SERPL-SCNC: 3.2 MMOL/L — LOW (ref 3.5–5.3)
PROT SERPL-MCNC: 8.6 G/DL — HIGH (ref 6–8.3)
PROTHROM AB SERPL-ACNC: 15.1 SEC — HIGH (ref 10.6–13.6)
RBC # BLD: 4.34 M/UL — SIGNIFICANT CHANGE UP (ref 3.8–5.2)
RBC # FLD: 14.5 % — SIGNIFICANT CHANGE UP (ref 10.3–14.5)
RH IG SCN BLD-IMP: POSITIVE — SIGNIFICANT CHANGE UP
SODIUM SERPL-SCNC: 139 MMOL/L — SIGNIFICANT CHANGE UP (ref 135–145)
WBC # BLD: 4.13 K/UL — SIGNIFICANT CHANGE UP (ref 3.8–10.5)
WBC # FLD AUTO: 4.13 K/UL — SIGNIFICANT CHANGE UP (ref 3.8–10.5)

## 2020-11-04 PROCEDURE — C1760: CPT

## 2020-11-04 PROCEDURE — 37243 VASC EMBOLIZE/OCCLUDE ORGAN: CPT

## 2020-11-04 PROCEDURE — 36247 INS CATH ABD/L-EXT ART 3RD: CPT

## 2020-11-04 PROCEDURE — C2616: CPT

## 2020-11-04 PROCEDURE — 85610 PROTHROMBIN TIME: CPT

## 2020-11-04 PROCEDURE — 76937 US GUIDE VASCULAR ACCESS: CPT | Mod: 26

## 2020-11-04 PROCEDURE — 82565 ASSAY OF CREATININE: CPT

## 2020-11-04 PROCEDURE — 80053 COMPREHEN METABOLIC PANEL: CPT

## 2020-11-04 PROCEDURE — 77300 RADIATION THERAPY DOSE PLAN: CPT

## 2020-11-04 PROCEDURE — C1894: CPT

## 2020-11-04 PROCEDURE — 74160 CT ABDOMEN W/CONTRAST: CPT

## 2020-11-04 PROCEDURE — 85730 THROMBOPLASTIN TIME PARTIAL: CPT

## 2020-11-04 PROCEDURE — 85027 COMPLETE CBC AUTOMATED: CPT

## 2020-11-04 PROCEDURE — 86901 BLOOD TYPING SEROLOGIC RH(D): CPT

## 2020-11-04 PROCEDURE — C1887: CPT

## 2020-11-04 PROCEDURE — 86850 RBC ANTIBODY SCREEN: CPT

## 2020-11-04 PROCEDURE — 77336 RADIATION PHYSICS CONSULT: CPT

## 2020-11-04 PROCEDURE — 76937 US GUIDE VASCULAR ACCESS: CPT

## 2020-11-04 PROCEDURE — 86900 BLOOD TYPING SEROLOGIC ABO: CPT

## 2020-11-04 PROCEDURE — C1769: CPT

## 2020-11-04 NOTE — PRE PROCEDURE NOTE - PRE PROCEDURE EVALUATION
Interventional Radiology Pre-Procedure Note    This is a 74y F with a 7.4 cm hepatoma predominantly being supplied by the right hepatic artery presenting for Y-90 radioembolization therapy.     Procedure: Right hepatic lobe Y90 radioembolization.    Diagnosis/Indication: Hepatoma    PAST MEDICAL & SURGICAL HISTORY:  Hepatic cirrhosis, unspecified hepatic cirrhosis type, unspecified whether ascites present    Thrombocytopenia    Hypertension, unspecified type    Vaginal cyst       Female    Allergies: No Known Allergies      LABS:  CBC Full  -  ( 04 Nov 2020 07:40 )  WBC Count : 4.13 K/uL  RBC Count : 4.34 M/uL  Hemoglobin : 13.0 g/dL  Hematocrit : 39.1 %  Platelet Count - Automated : 132 K/uL    11-04    139  |  103  |  8   ----------------------------<  114<H>  3.2<L>   |  24  |  0.84    Ca    9.5      04 Nov 2020 07:40    TPro  8.6<H>  /  Alb  4.0  /  TBili  1.4<H>  /  DBili  x   /  AST  104<H>  /  ALT  56<H>  /  AlkPhos  103  11-04    PT/INR - ( 04 Nov 2020 07:40 )   PT: 15.1 sec;   INR: 1.27 ratio      PTT - ( 04 Nov 2020 07:40 )  PTT:34.9 sec    Plan:  -Right hepatoma Y90 radioembolization.   -Procedure/ risks/ benefits were explained, informed consent obtained from patient, verbalizes understanding.

## 2020-11-04 NOTE — PROGRESS NOTE ADULT - SUBJECTIVE AND OBJECTIVE BOX
Interventional Radiology Brief- Operative Note    Procedure: Right hepatoma Y90 radioembolization    Operators: Dr. Ramirez, Dr. Amado, Dr. Kee    Anesthesia (type): IV Sedation    Contrast: 67 cc Omni 240    EBL: Minimal    Findings/Follow up Plan of Care: Right hepatic artery angiography demonstrated the known right hepatoma. Successful Y90 Therasphere embolization performed. Right groin hemostasis achieved using Mynx closure device.     Specimens Removed: None    Implants: None    Complications: None    Condition/Disposition: Stable/Recovery    Please call Interventional Radiology x [phone] with any questions, concerns, or issues.

## 2020-11-04 NOTE — PRE-ANESTHESIA EVALUATION ADULT - NSANTHPMHFT_GEN_ALL_CORE
Medical record reviewed including: HCC sp SIRT now for SIRT HVPG 11 no signs of decompensation stable hemodynamics

## 2020-11-04 NOTE — ASU DISCHARGE PLAN (ADULT/PEDIATRIC) - POST OP PHONE #
Please feel free to contact us at [phone] if any  problem arises. After 6PM, Monday through Friday on weekends and on holidays, please call [phone] and ask for the radiology resident on call to be paged.

## 2020-11-05 ENCOUNTER — APPOINTMENT (OUTPATIENT)
Dept: INTERVENTIONAL RADIOLOGY/VASCULAR | Facility: CLINIC | Age: 75
End: 2020-11-05
Payer: MEDICARE

## 2020-11-05 DIAGNOSIS — C22.0 LIVER CELL CARCINOMA: ICD-10-CM

## 2020-11-05 PROCEDURE — XXXXX: CPT

## 2020-11-09 ENCOUNTER — APPOINTMENT (OUTPATIENT)
Dept: HEPATOLOGY | Facility: CLINIC | Age: 75
End: 2020-11-09
Payer: MEDICARE

## 2020-11-09 PROCEDURE — 90739 HEPB VACC 2/4 DOSE ADULT IM: CPT

## 2020-11-09 PROCEDURE — 99072 ADDL SUPL MATRL&STAF TM PHE: CPT

## 2020-11-09 PROCEDURE — G0010: CPT

## 2020-11-11 DIAGNOSIS — C22.0 LIVER CELL CARCINOMA: ICD-10-CM

## 2020-11-17 ENCOUNTER — NON-APPOINTMENT (OUTPATIENT)
Age: 75
End: 2020-11-17

## 2020-11-18 ENCOUNTER — LABORATORY RESULT (OUTPATIENT)
Age: 75
End: 2020-11-18

## 2020-11-18 ENCOUNTER — RESULT REVIEW (OUTPATIENT)
Age: 75
End: 2020-11-18

## 2020-11-18 ENCOUNTER — OUTPATIENT (OUTPATIENT)
Dept: OUTPATIENT SERVICES | Facility: HOSPITAL | Age: 75
LOS: 1 days | Discharge: HOME | End: 2020-11-18

## 2020-11-18 ENCOUNTER — APPOINTMENT (OUTPATIENT)
Age: 75
End: 2020-11-18

## 2020-11-18 DIAGNOSIS — C22.0 LIVER CELL CARCINOMA: ICD-10-CM

## 2020-11-18 DIAGNOSIS — N89.8 OTHER SPECIFIED NONINFLAMMATORY DISORDERS OF VAGINA: Chronic | ICD-10-CM

## 2020-11-18 LAB
BASOPHILS # BLD AUTO: 0.02 K/UL — SIGNIFICANT CHANGE UP (ref 0–0.2)
BASOPHILS NFR BLD AUTO: 0.6 % — SIGNIFICANT CHANGE UP (ref 0–2)
EOSINOPHIL # BLD AUTO: 0.09 K/UL — SIGNIFICANT CHANGE UP (ref 0–0.5)
EOSINOPHIL NFR BLD AUTO: 2.7 % — SIGNIFICANT CHANGE UP (ref 0–6)
HCT VFR BLD CALC: 37.2 % — SIGNIFICANT CHANGE UP (ref 34.5–45)
HGB BLD-MCNC: 12.3 G/DL — SIGNIFICANT CHANGE UP (ref 11.5–15.5)
IMM GRANULOCYTES NFR BLD AUTO: 0.9 % — SIGNIFICANT CHANGE UP (ref 0–1.5)
LYMPHOCYTES # BLD AUTO: 0.19 K/UL — LOW (ref 1–3.3)
LYMPHOCYTES # BLD AUTO: 5.7 % — LOW (ref 13–44)
MCHC RBC-ENTMCNC: 29.9 PG — SIGNIFICANT CHANGE UP (ref 27–34)
MCHC RBC-ENTMCNC: 33.1 G/DL — SIGNIFICANT CHANGE UP (ref 32–36)
MCV RBC AUTO: 90.5 FL — SIGNIFICANT CHANGE UP (ref 80–100)
MONOCYTES # BLD AUTO: 0.3 K/UL — SIGNIFICANT CHANGE UP (ref 0–0.9)
MONOCYTES NFR BLD AUTO: 8.9 % — SIGNIFICANT CHANGE UP (ref 2–14)
NEUTROPHILS # BLD AUTO: 2.73 K/UL — SIGNIFICANT CHANGE UP (ref 1.8–7.4)
NEUTROPHILS NFR BLD AUTO: 81.2 % — HIGH (ref 43–77)
NRBC # BLD: 0 /100 WBCS — SIGNIFICANT CHANGE UP (ref 0–0)
PLATELET # BLD AUTO: 97 K/UL — LOW (ref 150–400)
RBC # BLD: 4.11 M/UL — SIGNIFICANT CHANGE UP (ref 3.8–5.2)
RBC # FLD: 14.9 % — HIGH (ref 10.3–14.5)
WBC # BLD: 3.36 K/UL — LOW (ref 3.8–10.5)
WBC # FLD AUTO: 3.36 K/UL — LOW (ref 3.8–10.5)

## 2020-12-04 ENCOUNTER — APPOINTMENT (OUTPATIENT)
Dept: INTERVENTIONAL RADIOLOGY/VASCULAR | Facility: CLINIC | Age: 75
End: 2020-12-04
Payer: MEDICARE

## 2020-12-04 PROCEDURE — 99443: CPT

## 2020-12-04 RX ORDER — METHYLPREDNISOLONE 4 MG/1
4 TABLET ORAL
Qty: 1 | Refills: 0 | Status: DISCONTINUED | COMMUNITY
Start: 2020-10-28 | End: 2020-12-04

## 2020-12-04 RX ORDER — PANTOPRAZOLE 40 MG/1
40 TABLET, DELAYED RELEASE ORAL
Qty: 35 | Refills: 0 | Status: DISCONTINUED | COMMUNITY
Start: 2020-10-28 | End: 2020-12-04

## 2020-12-14 ENCOUNTER — RESULT REVIEW (OUTPATIENT)
Age: 75
End: 2020-12-14

## 2020-12-14 ENCOUNTER — APPOINTMENT (OUTPATIENT)
Dept: HEPATOLOGY | Facility: CLINIC | Age: 75
End: 2020-12-14
Payer: MEDICARE

## 2020-12-14 ENCOUNTER — APPOINTMENT (OUTPATIENT)
Age: 75
End: 2020-12-14
Payer: MEDICARE

## 2020-12-14 VITALS
DIASTOLIC BLOOD PRESSURE: 74 MMHG | OXYGEN SATURATION: 99 % | RESPIRATION RATE: 14 BRPM | TEMPERATURE: 98.1 F | HEART RATE: 88 BPM | WEIGHT: 145.51 LBS | BODY MASS INDEX: 25.77 KG/M2 | SYSTOLIC BLOOD PRESSURE: 124 MMHG

## 2020-12-14 LAB
AFP-TM SERPL-MCNC: 133 NG/ML
BASOPHILS # BLD AUTO: 0 K/UL — SIGNIFICANT CHANGE UP (ref 0–0.2)
BASOPHILS NFR BLD AUTO: 0 % — SIGNIFICANT CHANGE UP (ref 0–2)
EOSINOPHIL # BLD AUTO: 0.05 K/UL — SIGNIFICANT CHANGE UP (ref 0–0.5)
EOSINOPHIL NFR BLD AUTO: 2 % — SIGNIFICANT CHANGE UP (ref 0–6)
HCT VFR BLD CALC: 37.6 % — SIGNIFICANT CHANGE UP (ref 34.5–45)
HGB BLD-MCNC: 12.4 G/DL — SIGNIFICANT CHANGE UP (ref 11.5–15.5)
LYMPHOCYTES # BLD AUTO: 0.38 K/UL — LOW (ref 1–3.3)
LYMPHOCYTES # BLD AUTO: 14 % — SIGNIFICANT CHANGE UP (ref 13–44)
MCHC RBC-ENTMCNC: 30.5 PG — SIGNIFICANT CHANGE UP (ref 27–34)
MCHC RBC-ENTMCNC: 33 G/DL — SIGNIFICANT CHANGE UP (ref 32–36)
MCV RBC AUTO: 92.4 FL — SIGNIFICANT CHANGE UP (ref 80–100)
MONOCYTES # BLD AUTO: 0.25 K/UL — SIGNIFICANT CHANGE UP (ref 0–0.9)
MONOCYTES NFR BLD AUTO: 9 % — SIGNIFICANT CHANGE UP (ref 2–14)
MYELOCYTES NFR BLD: 1 % — HIGH (ref 0–0)
NEUTROPHILS # BLD AUTO: 2.02 K/UL — SIGNIFICANT CHANGE UP (ref 1.8–7.4)
NEUTROPHILS NFR BLD AUTO: 74 % — SIGNIFICANT CHANGE UP (ref 43–77)
NRBC # BLD: 0 /100 — SIGNIFICANT CHANGE UP (ref 0–0)
NRBC # BLD: SIGNIFICANT CHANGE UP /100 WBCS (ref 0–0)
PLAT MORPH BLD: NORMAL — SIGNIFICANT CHANGE UP
PLATELET # BLD AUTO: 80 K/UL — LOW (ref 150–400)
RBC # BLD: 4.07 M/UL — SIGNIFICANT CHANGE UP (ref 3.8–5.2)
RBC # FLD: 15 % — HIGH (ref 10.3–14.5)
RBC BLD AUTO: SIGNIFICANT CHANGE UP
WBC # BLD: 2.73 K/UL — LOW (ref 3.8–10.5)
WBC # FLD AUTO: 2.73 K/UL — LOW (ref 3.8–10.5)

## 2020-12-14 PROCEDURE — 99072 ADDL SUPL MATRL&STAF TM PHE: CPT

## 2020-12-14 PROCEDURE — G0010: CPT

## 2020-12-14 PROCEDURE — 90739 HEPB VACC 2/4 DOSE ADULT IM: CPT

## 2020-12-14 PROCEDURE — 99213 OFFICE O/P EST LOW 20 MIN: CPT

## 2020-12-22 LAB
ALBUMIN SERPL ELPH-MCNC: 3.5 G/DL
ALP BLD-CCNC: 120 U/L
ALT SERPL-CCNC: 60 U/L
ANION GAP SERPL CALC-SCNC: 11 MMOL/L
AST SERPL-CCNC: 121 U/L
BILIRUB SERPL-MCNC: 1.9 MG/DL
BUN SERPL-MCNC: 8 MG/DL
CALCIUM SERPL-MCNC: 9.3 MG/DL
CHLORIDE SERPL-SCNC: 98 MMOL/L
CO2 SERPL-SCNC: 26 MMOL/L
CREAT SERPL-MCNC: 0.84 MG/DL
GLUCOSE SERPL-MCNC: 146 MG/DL
POTASSIUM SERPL-SCNC: 4.2 MMOL/L
PROT SERPL-MCNC: 8.3 G/DL
SODIUM SERPL-SCNC: 136 MMOL/L

## 2021-01-01 ENCOUNTER — RESULT REVIEW (OUTPATIENT)
Age: 76
End: 2021-01-01

## 2021-01-01 ENCOUNTER — APPOINTMENT (OUTPATIENT)
Dept: CT IMAGING | Facility: IMAGING CENTER | Age: 76
End: 2021-01-01
Payer: MEDICARE

## 2021-01-01 ENCOUNTER — APPOINTMENT (OUTPATIENT)
Dept: MRI IMAGING | Facility: IMAGING CENTER | Age: 76
End: 2021-01-01
Payer: MEDICARE

## 2021-01-01 ENCOUNTER — APPOINTMENT (OUTPATIENT)
Dept: MRI IMAGING | Facility: CLINIC | Age: 76
End: 2021-01-01
Payer: MEDICARE

## 2021-01-01 ENCOUNTER — OUTPATIENT (OUTPATIENT)
Dept: OUTPATIENT SERVICES | Facility: HOSPITAL | Age: 76
LOS: 1 days | Discharge: HOME | End: 2021-01-01

## 2021-01-01 ENCOUNTER — NON-APPOINTMENT (OUTPATIENT)
Age: 76
End: 2021-01-01

## 2021-01-01 ENCOUNTER — APPOINTMENT (OUTPATIENT)
Dept: NUCLEAR MEDICINE | Facility: IMAGING CENTER | Age: 76
End: 2021-01-01
Payer: MEDICARE

## 2021-01-01 ENCOUNTER — APPOINTMENT (OUTPATIENT)
Dept: INTERVENTIONAL RADIOLOGY/VASCULAR | Facility: CLINIC | Age: 76
End: 2021-01-01

## 2021-01-01 ENCOUNTER — OUTPATIENT (OUTPATIENT)
Dept: OUTPATIENT SERVICES | Facility: HOSPITAL | Age: 76
LOS: 1 days | End: 2021-01-01
Payer: MEDICARE

## 2021-01-01 ENCOUNTER — APPOINTMENT (OUTPATIENT)
Dept: HEMATOLOGY ONCOLOGY | Facility: CLINIC | Age: 76
End: 2021-01-01
Payer: MEDICARE

## 2021-01-01 ENCOUNTER — APPOINTMENT (OUTPATIENT)
Dept: INTERVENTIONAL RADIOLOGY/VASCULAR | Facility: CLINIC | Age: 76
End: 2021-01-01
Payer: MEDICARE

## 2021-01-01 VITALS
WEIGHT: 147.42 LBS | HEART RATE: 90 BPM | RESPIRATION RATE: 14 BRPM | DIASTOLIC BLOOD PRESSURE: 73 MMHG | OXYGEN SATURATION: 98 % | BODY MASS INDEX: 26.12 KG/M2 | TEMPERATURE: 98 F | SYSTOLIC BLOOD PRESSURE: 114 MMHG

## 2021-01-01 DIAGNOSIS — Z00.8 ENCOUNTER FOR OTHER GENERAL EXAMINATION: ICD-10-CM

## 2021-01-01 DIAGNOSIS — N89.8 OTHER SPECIFIED NONINFLAMMATORY DISORDERS OF VAGINA: Chronic | ICD-10-CM

## 2021-01-01 DIAGNOSIS — C22.0 LIVER CELL CARCINOMA: ICD-10-CM

## 2021-01-01 DIAGNOSIS — D69.6 THROMBOCYTOPENIA, UNSPECIFIED: ICD-10-CM

## 2021-01-01 LAB
AFP-TM SERPL-MCNC: ABNORMAL NG/ML
ALBUMIN SERPL ELPH-MCNC: 3.4 G/DL
ALP BLD-CCNC: 131 U/L
ALT SERPL-CCNC: 61 U/L
ANION GAP SERPL CALC-SCNC: 8 MMOL/L
APTT BLD: 38.6 SEC
AST SERPL-CCNC: 211 U/L
BASOPHILS # BLD AUTO: 0.02 K/UL — SIGNIFICANT CHANGE UP (ref 0–0.2)
BASOPHILS NFR BLD AUTO: 0.8 % — SIGNIFICANT CHANGE UP (ref 0–2)
BILIRUB SERPL-MCNC: 2.1 MG/DL
BUN SERPL-MCNC: 8 MG/DL
CALCIUM SERPL-MCNC: 8.7 MG/DL
CHLORIDE SERPL-SCNC: 107 MMOL/L
CO2 SERPL-SCNC: 23 MMOL/L
CREAT SERPL-MCNC: 0.64 MG/DL
EOSINOPHIL # BLD AUTO: 0.05 K/UL — SIGNIFICANT CHANGE UP (ref 0–0.5)
EOSINOPHIL NFR BLD AUTO: 2.1 % — SIGNIFICANT CHANGE UP (ref 0–6)
GLUCOSE SERPL-MCNC: 103 MG/DL
HCT VFR BLD CALC: 38.8 % — SIGNIFICANT CHANGE UP (ref 34.5–45)
HGB BLD-MCNC: 13 G/DL — SIGNIFICANT CHANGE UP (ref 11.5–15.5)
IMM GRANULOCYTES NFR BLD AUTO: 0 % — SIGNIFICANT CHANGE UP (ref 0–1.5)
INR PPP: 1.31 RATIO
LYMPHOCYTES # BLD AUTO: 0.54 K/UL — LOW (ref 1–3.3)
LYMPHOCYTES # BLD AUTO: 22.4 % — SIGNIFICANT CHANGE UP (ref 13–44)
MCHC RBC-ENTMCNC: 33.2 PG — SIGNIFICANT CHANGE UP (ref 27–34)
MCHC RBC-ENTMCNC: 33.5 G/DL — SIGNIFICANT CHANGE UP (ref 32–36)
MCV RBC AUTO: 99 FL — SIGNIFICANT CHANGE UP (ref 80–100)
MONOCYTES # BLD AUTO: 0.21 K/UL — SIGNIFICANT CHANGE UP (ref 0–0.9)
MONOCYTES NFR BLD AUTO: 8.7 % — SIGNIFICANT CHANGE UP (ref 2–14)
NEUTROPHILS # BLD AUTO: 1.59 K/UL — LOW (ref 1.8–7.4)
NEUTROPHILS NFR BLD AUTO: 66 % — SIGNIFICANT CHANGE UP (ref 43–77)
NRBC # BLD: 0 /100 WBCS — SIGNIFICANT CHANGE UP (ref 0–0)
PLATELET # BLD AUTO: 68 K/UL — LOW (ref 150–400)
POTASSIUM SERPL-SCNC: 4.2 MMOL/L
PROT SERPL-MCNC: 8.2 G/DL
PT BLD: 15.3 SEC
RBC # BLD: 3.92 M/UL — SIGNIFICANT CHANGE UP (ref 3.8–5.2)
RBC # FLD: 14 % — SIGNIFICANT CHANGE UP (ref 10.3–14.5)
SODIUM SERPL-SCNC: 138 MMOL/L
WBC # BLD: 2.41 K/UL — LOW (ref 3.8–10.5)
WBC # FLD AUTO: 2.41 K/UL — LOW (ref 3.8–10.5)

## 2021-01-01 PROCEDURE — 74183 MRI ABD W/O CNTR FLWD CNTR: CPT | Mod: 26

## 2021-01-01 PROCEDURE — 78306 BONE IMAGING WHOLE BODY: CPT | Mod: 26

## 2021-01-01 PROCEDURE — 99213 OFFICE O/P EST LOW 20 MIN: CPT

## 2021-01-01 PROCEDURE — 78306 BONE IMAGING WHOLE BODY: CPT

## 2021-01-01 PROCEDURE — 74183 MRI ABD W/O CNTR FLWD CNTR: CPT

## 2021-01-01 PROCEDURE — A9585: CPT

## 2021-01-01 PROCEDURE — 99443: CPT | Mod: 95

## 2021-01-01 PROCEDURE — 71250 CT THORAX DX C-: CPT

## 2021-01-01 PROCEDURE — A9561: CPT

## 2021-01-01 PROCEDURE — 71250 CT THORAX DX C-: CPT | Mod: 26

## 2021-01-01 RX ORDER — BETAMETHASONE DIPROPIONATE 0.5 MG/G
0.05 OINTMENT, AUGMENTED TOPICAL
Qty: 2 | Refills: 2 | Status: DISCONTINUED | COMMUNITY
Start: 2020-06-24 | End: 2021-01-01

## 2021-01-03 NOTE — REVIEW OF SYSTEMS
[Recent Change In Weight] : ~T recent weight change [Constipation] : constipation [Negative] : Allergic/Immunologic [FreeTextEntry2] : + poor appetite

## 2021-01-03 NOTE — HISTORY OF PRESENT ILLNESS
[Disease: _____________________] : Disease: [unfilled] [de-identified] : 74 F w/ Hepatitis C, cirrhosis presents for management of HCC. \par \par In June 2020, pt developed a rash and sought derm evaluation. Skin bx c/w lichen planus and blood work showed active Hepatitis C infection. Referred to hepatology and further testing revealed an elevated AFP. MRI Abdomen showed a 7.5 liver tumor in segment 6/7 of R lobe and smaller satellite lesions vs extension of the tumor. Pt referred to IR and medical oncology for further evaluation. \par \par 9/17/20: CT Chest: CHRISTOS, bone scan: neg\par 9/20/20: EGD: small varices, no treatment colo: neg \par 11/3/20: CT AP: 8.7 cm hepatic segment 6/7 mass increased in size from prior \par 11/4/20: R lobe SIRT \par  [de-identified] : n/a [de-identified] :  [de-identified] : Lost 6 lbs since october. c/o dysgeusia. + constipation. Tolerated SIRT well. + fatigue

## 2021-01-12 ENCOUNTER — OUTPATIENT (OUTPATIENT)
Dept: OUTPATIENT SERVICES | Facility: HOSPITAL | Age: 76
LOS: 1 days | Discharge: HOME | End: 2021-01-12

## 2021-01-12 DIAGNOSIS — N89.8 OTHER SPECIFIED NONINFLAMMATORY DISORDERS OF VAGINA: Chronic | ICD-10-CM

## 2021-01-12 DIAGNOSIS — C22.0 LIVER CELL CARCINOMA: ICD-10-CM

## 2021-01-15 ENCOUNTER — APPOINTMENT (OUTPATIENT)
Dept: HEMATOLOGY ONCOLOGY | Facility: CLINIC | Age: 76
End: 2021-01-15

## 2021-02-12 ENCOUNTER — APPOINTMENT (OUTPATIENT)
Dept: MRI IMAGING | Facility: CLINIC | Age: 76
End: 2021-02-12
Payer: MEDICARE

## 2021-02-12 ENCOUNTER — OUTPATIENT (OUTPATIENT)
Dept: OUTPATIENT SERVICES | Facility: HOSPITAL | Age: 76
LOS: 1 days | End: 2021-02-12
Payer: COMMERCIAL

## 2021-02-12 DIAGNOSIS — Z00.8 ENCOUNTER FOR OTHER GENERAL EXAMINATION: ICD-10-CM

## 2021-02-12 DIAGNOSIS — N89.8 OTHER SPECIFIED NONINFLAMMATORY DISORDERS OF VAGINA: Chronic | ICD-10-CM

## 2021-02-12 PROCEDURE — A9585: CPT

## 2021-02-12 PROCEDURE — 74183 MRI ABD W/O CNTR FLWD CNTR: CPT | Mod: 26

## 2021-02-12 PROCEDURE — 74183 MRI ABD W/O CNTR FLWD CNTR: CPT

## 2021-02-18 ENCOUNTER — OUTPATIENT (OUTPATIENT)
Dept: OUTPATIENT SERVICES | Facility: HOSPITAL | Age: 76
LOS: 1 days | Discharge: HOME | End: 2021-02-18

## 2021-02-18 DIAGNOSIS — C22.0 LIVER CELL CARCINOMA: ICD-10-CM

## 2021-02-18 DIAGNOSIS — N89.8 OTHER SPECIFIED NONINFLAMMATORY DISORDERS OF VAGINA: Chronic | ICD-10-CM

## 2021-02-22 ENCOUNTER — APPOINTMENT (OUTPATIENT)
Age: 76
End: 2021-02-22
Payer: MEDICARE

## 2021-02-22 ENCOUNTER — RESULT REVIEW (OUTPATIENT)
Age: 76
End: 2021-02-22

## 2021-02-22 VITALS
SYSTOLIC BLOOD PRESSURE: 122 MMHG | OXYGEN SATURATION: 100 % | WEIGHT: 152.12 LBS | HEART RATE: 120 BPM | RESPIRATION RATE: 14 BRPM | BODY MASS INDEX: 26.95 KG/M2 | DIASTOLIC BLOOD PRESSURE: 79 MMHG | TEMPERATURE: 98 F

## 2021-02-22 LAB
BASOPHILS # BLD AUTO: 0.02 K/UL — SIGNIFICANT CHANGE UP (ref 0–0.2)
BASOPHILS NFR BLD AUTO: 0.6 % — SIGNIFICANT CHANGE UP (ref 0–2)
EOSINOPHIL # BLD AUTO: 0.04 K/UL — SIGNIFICANT CHANGE UP (ref 0–0.5)
EOSINOPHIL NFR BLD AUTO: 1.3 % — SIGNIFICANT CHANGE UP (ref 0–6)
HCT VFR BLD CALC: 38.4 % — SIGNIFICANT CHANGE UP (ref 34.5–45)
HGB BLD-MCNC: 12.8 G/DL — SIGNIFICANT CHANGE UP (ref 11.5–15.5)
IMM GRANULOCYTES NFR BLD AUTO: 0.3 % — SIGNIFICANT CHANGE UP (ref 0–1.5)
LYMPHOCYTES # BLD AUTO: 0.52 K/UL — LOW (ref 1–3.3)
LYMPHOCYTES # BLD AUTO: 16.6 % — SIGNIFICANT CHANGE UP (ref 13–44)
MCHC RBC-ENTMCNC: 31.4 PG — SIGNIFICANT CHANGE UP (ref 27–34)
MCHC RBC-ENTMCNC: 33.3 G/DL — SIGNIFICANT CHANGE UP (ref 32–36)
MCV RBC AUTO: 94.1 FL — SIGNIFICANT CHANGE UP (ref 80–100)
MONOCYTES # BLD AUTO: 0.32 K/UL — SIGNIFICANT CHANGE UP (ref 0–0.9)
MONOCYTES NFR BLD AUTO: 10.2 % — SIGNIFICANT CHANGE UP (ref 2–14)
NEUTROPHILS # BLD AUTO: 2.23 K/UL — SIGNIFICANT CHANGE UP (ref 1.8–7.4)
NEUTROPHILS NFR BLD AUTO: 71 % — SIGNIFICANT CHANGE UP (ref 43–77)
NRBC # BLD: 0 /100 WBCS — SIGNIFICANT CHANGE UP (ref 0–0)
PLATELET # BLD AUTO: 73 K/UL — LOW (ref 150–400)
RBC # BLD: 4.08 M/UL — SIGNIFICANT CHANGE UP (ref 3.8–5.2)
RBC # FLD: 14.7 % — HIGH (ref 10.3–14.5)
WBC # BLD: 3.14 K/UL — LOW (ref 3.8–10.5)
WBC # FLD AUTO: 3.14 K/UL — LOW (ref 3.8–10.5)

## 2021-02-22 PROCEDURE — 99072 ADDL SUPL MATRL&STAF TM PHE: CPT

## 2021-02-22 PROCEDURE — 99213 OFFICE O/P EST LOW 20 MIN: CPT

## 2021-02-23 LAB
AFP-TM SERPL-MCNC: 159 NG/ML
ALBUMIN SERPL ELPH-MCNC: 3.7 G/DL
ALP BLD-CCNC: 110 U/L
ALT SERPL-CCNC: 39 U/L
ANION GAP SERPL CALC-SCNC: 10 MMOL/L
AST SERPL-CCNC: 70 U/L
BILIRUB SERPL-MCNC: 1.5 MG/DL
BUN SERPL-MCNC: 8 MG/DL
CALCIUM SERPL-MCNC: 8.9 MG/DL
CHLORIDE SERPL-SCNC: 102 MMOL/L
CO2 SERPL-SCNC: 24 MMOL/L
CREAT SERPL-MCNC: 0.69 MG/DL
GLUCOSE SERPL-MCNC: 159 MG/DL
POTASSIUM SERPL-SCNC: 3.9 MMOL/L
PROT SERPL-MCNC: 8.5 G/DL
SODIUM SERPL-SCNC: 137 MMOL/L

## 2021-02-28 NOTE — HISTORY OF PRESENT ILLNESS
[Disease: _____________________] : Disease: [unfilled] [T: ___] : T[unfilled] [N: ___] : N[unfilled] [M: ___] : M[unfilled] [AJCC Stage: ____] : AJCC Stage: [unfilled] [de-identified] : 74 F w/ Hepatitis C, cirrhosis presents for management of HCC. \par \par In June 2020, pt developed a rash and sought derm evaluation. Skin bx c/w lichen planus and blood work showed active Hepatitis C infection. Referred to hepatology and further testing revealed an elevated AFP. MRI Abdomen showed a 7.5 liver tumor in segment 6/7 of R lobe and smaller satellite lesions vs extension of the tumor. Pt referred to IR and medical oncology for further evaluation. \par \par 9/17/20: CT Chest: CHRISTOS, bone scan: neg\par 9/20/20: EGD: small varices, no treatment colo: neg \par 11/3/20: CT AP: 8.7 cm hepatic segment 6/7 mass increased in size from prior \par 11/4/20: R lobe SIRT \par 2/12/21: MR Abd: decreased areas of internal enhancement c/w SIRT tx [de-identified] : n/a [de-identified] :  [FreeTextEntry1] : s/p SIRT in Novv 2020 [de-identified] : Overall feels ok. Anxious about MR results. Appetite is stable. Denies any pain.

## 2021-03-03 ENCOUNTER — RESULT REVIEW (OUTPATIENT)
Age: 76
End: 2021-03-03

## 2021-03-11 ENCOUNTER — OUTPATIENT (OUTPATIENT)
Dept: OUTPATIENT SERVICES | Facility: HOSPITAL | Age: 76
LOS: 1 days | End: 2021-03-11
Payer: MEDICARE

## 2021-03-11 ENCOUNTER — APPOINTMENT (OUTPATIENT)
Dept: NUCLEAR MEDICINE | Facility: IMAGING CENTER | Age: 76
End: 2021-03-11
Payer: MEDICARE

## 2021-03-11 ENCOUNTER — RESULT REVIEW (OUTPATIENT)
Age: 76
End: 2021-03-11

## 2021-03-11 ENCOUNTER — APPOINTMENT (OUTPATIENT)
Dept: CT IMAGING | Facility: IMAGING CENTER | Age: 76
End: 2021-03-11
Payer: MEDICARE

## 2021-03-11 DIAGNOSIS — C22.0 LIVER CELL CARCINOMA: ICD-10-CM

## 2021-03-11 DIAGNOSIS — N89.8 OTHER SPECIFIED NONINFLAMMATORY DISORDERS OF VAGINA: Chronic | ICD-10-CM

## 2021-03-11 DIAGNOSIS — Z00.8 ENCOUNTER FOR OTHER GENERAL EXAMINATION: ICD-10-CM

## 2021-03-11 PROCEDURE — 78830 RP LOCLZJ TUM SPECT W/CT 1: CPT | Mod: 26

## 2021-03-11 PROCEDURE — 78306 BONE IMAGING WHOLE BODY: CPT

## 2021-03-11 PROCEDURE — 71250 CT THORAX DX C-: CPT | Mod: 26

## 2021-03-11 PROCEDURE — 78830 RP LOCLZJ TUM SPECT W/CT 1: CPT

## 2021-03-11 PROCEDURE — 71250 CT THORAX DX C-: CPT

## 2021-03-11 PROCEDURE — A9561: CPT

## 2021-03-11 PROCEDURE — 78306 BONE IMAGING WHOLE BODY: CPT | Mod: 26

## 2021-10-29 PROBLEM — D69.6 THROMBOCYTOPENIA: Status: ACTIVE | Noted: 2020-07-10

## 2021-11-12 NOTE — HISTORY OF PRESENT ILLNESS
[Disease: _____________________] : Disease: [unfilled] [T: ___] : T[unfilled] [N: ___] : N[unfilled] [M: ___] : M[unfilled] [AJCC Stage: ____] : AJCC Stage: [unfilled] [de-identified] : 74 F w/ Hepatitis C, cirrhosis presents for management of HCC. \par \par In June 2020, pt developed a rash and sought derm evaluation. Skin bx c/w lichen planus and blood work showed active Hepatitis C infection. Referred to hepatology and further testing revealed an elevated AFP. MRI Abdomen showed a 7.5 liver tumor in segment 6/7 of R lobe and smaller satellite lesions vs extension of the tumor. Pt referred to IR and medical oncology for further evaluation. \par \par 9/17/20: CT Chest: CHRISTOS, bone scan: neg\par 9/20/20: EGD: small varices, no treatment colo: neg \par 11/3/20: CT AP: 8.7 cm hepatic segment 6/7 mass increased in size from prior \par 11/4/20: R lobe SIRT \par 2/12/21: MR Abd: decreased areas of internal enhancement c/w SIRT tx [de-identified] : n/a [de-identified] :  [de-identified] : overall feels well. c/o chronic back pain, which has been stable. pending further LDT with IR. Reviewed recent MRI abd with pt, there was POD noted. \par Good appetite. cont to c/o dysgeusia

## 2021-12-06 NOTE — PRE-ANESTHESIA EVALUATION ADULT - WEIGHT IN LBS
-Medication(s) as prescribed or directed  -Complete full course of antibiotics  -Change tooth brush 2 days after start of antibiotics  -Avoid exposure to cigarette smoke    Follow-up with primary care provider in the next 3-5 days for re-evaluation. If NEW or WORSENING symptoms develop, please seek immediate medical attention at the nearest Emergency Room as discussed and/or indicated/outlined below in AVS.    Please review additional instructions as documented in the AVS below.    Thank you for visiting [facility].      Patient Education     Bacterial Sore Throat: Strep Confirmed (Child)   Sore throat (pharyngitis) is a common condition in children. It can be caused by an infection with the bacterium streptococcus. This is commonly known as strep throat.   Strep throat starts suddenly. Symptoms include a red, swollen throat and swollen lymph nodes, which make it painful to swallow. Red spots may appear on the roof of the mouth or white spots on the tonsils. Some children will be flushed and have a fever. Young children may not show that they feel pain. But they may refuse to eat or drink, or drool a lot.   Testing has confirmed strep throat. Antibiotic treatment has been prescribed. This treatment may be given by injection or pills. Children with strep throat are contagious until they have been taking an antibiotic for 24 hours.    Home care  Medicines  Follow these guidelines when giving your child medicine at home:  · The healthcare provider has prescribed an antibiotic to treat the infection and possibly medicine to treat a fever. Follow the provider’s instructions for giving these medicines to your child. Make sure your child takes the medicine every day until it's gone. You should not have any left over.   · If your child has pain or fever, you can give him or her medicine as advised by the healthcare provider.    · Don't give your child any other medicine without first asking the healthcare  provider, especially the first time.  · If your child received an antibiotic shot, your child should not need any other antibiotics.  Follow these tips when giving fever medicine to a usually healthy child:  · Don’t give ibuprofen to children younger than 6 months old. Also don’t give ibuprofen to an older child who is vomiting constantly and is dehydrated.  · Read the label before giving fever medicine. This is to make sure that you are giving the right dose. The dose should be right for your child’s age and weight.  · If your child is taking other medicine, check the list of ingredients. Look for acetaminophen or ibuprofen. If the medicine contains either of these, tell your child’s healthcare provider before giving your child the medicine. This is to prevent a possible overdose.  · If your child is younger than 2 years, talk with your child’s healthcare provider before giving any medicines to find out the right medicine to use and how much to give.  · Don’t give aspirin to a child younger than 19 years old who is ill with a fever. Aspirin can cause serious side effects such as liver damage and Reye syndrome. Although rare, Reye syndrome is a very serious illness usually found in children younger than age 15. The syndrome is closely linked to the use of aspirin or aspirin-containing medicines during viral infections.  General care  · Wash your hands with clean, running water and soap before and after caring for your child. This is to help prevent the spread of infection. Others should do the same.  · Limit your child's contact with others until he or she is no longer contagious. This is 24 hours after starting antibiotics or as advised by your child’s provider. Keep him or her home from school or day care.  · Give your child plenty of time to rest.  · Encourage your child to drink liquids.  · Don’t force your child to eat. If your child feels like eating, don’t give him or her salty or spicy foods. These can  irritate the throat.  · Older children may prefer ice chips, cold drinks, frozen desserts, or ice pops.  · Older children may also like warm chicken soup or beverages with lemon and honey. Don’t give honey to a child younger than 1 year old.  · Older children may gargle with warm salt water to ease throat pain. Have your child spit out the gargle afterward and not swallow it.   · Tell people who may have had contact with your child about his or her illness. This may include school officials and  center workers.     Follow-up care  Follow up with your child’s healthcare provider, or as advised.  When to seek medical advice  Call your child's healthcare provider right away if any of these occur:  · Fever (see Fever and children, below)  · Symptoms don’t get better after taking prescribed medicine or seem to be getting worse  · New or worsening ear pain, sinus pain, or headache  · Painful lumps in the back of neck  · Lymph nodes are getting larger   · Your child can’t swallow liquids, has lots of drooling, or can’t open his or her mouth wide because of throat pain  · Signs of dehydration. These include very dark urine or no urine, sunken eyes, and dizziness.  · Noisy breathing  · Muffled voice  · New rash  Call 911  Call 911 if your child has any of these:   · Fever and your child has been in a very hot place such as an overheated car  · Trouble breathing  · Confusion  · Feeling drowsy or having trouble waking up  · Unresponsive  · Fainting or loss of consciousness  · Fast (rapid) heart rate  · Seizure  · Stiff neck  Fever and children  Use a digital thermometer to check your child’s temperature. Don’t use a mercury thermometer. There are different kinds and uses of digital thermometers. They include:   · Rectal. For children younger than 3 years, a rectal temperature is the most accurate.  · Forehead (temporal). This works for children age 3 months and older. If a child under 3 months old has signs of illness,  this can be used for a first pass. The provider may want to confirm with a rectal temperature.  · Ear (tympanic). Ear temperatures are accurate after 6 months of age, but not before.  · Armpit (axillary). This is the least reliable but may be used for a first pass to check a child of any age with signs of illness. The provider may want to confirm with a rectal temperature.  · Mouth (oral). Don’t use a thermometer in your child’s mouth until he or she is at least 4 years old.  Use the rectal thermometer with care. Follow the product maker’s directions for correct use. Insert it gently. Label it and make sure it’s not used in the mouth. It may pass on germs from the stool. If you don’t feel OK using a rectal thermometer, ask the healthcare provider what type to use instead. When you talk with any healthcare provider about your child’s fever, tell him or her which type you used.   Below are guidelines to know if your young child has a fever. Your child’s healthcare provider may give you different numbers for your child. Follow your provider’s specific instructions.   Fever readings for a baby under 3 months old:   · First, ask your child’s healthcare provider how you should take the temperature.  · Rectal or forehead: 100.4°F (38°C) or higher  · Armpit: 99°F (37.2°C) or higher  Fever readings for a child age 3 months to 36 months (3 years):   · Rectal, forehead, or ear: 102°F (38.9°C) or higher  · Armpit: 101°F (38.3°C) or higher  Call the healthcare provider in these cases:   · Repeated temperature of 104°F (40°C) or higher in a child of any age  · Fever of 100.4° F (38° C) or higher in baby younger than 3 months  · Fever that lasts more than 24 hours in a child under age 2  · Fever that lasts for 3 days in a child age 2 or older    [employer] last reviewed this educational content on 4/1/2020 © 2000-2021 [employer]. All rights reserved. This information is not intended as a substitute for professional  medical care. Always follow your healthcare professional's instructions.           Patient Education     Kid Care: Colds  Colds are a common childhood illness. The following suggestions should help your child get back up to speed soon. If your child hasn’t had a fever for the past 24 hours and feels OK, he or she can return to regular activities at school and at play. You can help prevent future colds by following the tips at the end of this sheet.     There is no cure for the common cold. An older child usually doesn't need to see a doctor unless the cold becomes serious. If your child is 3 months or younger, call your healthcare provider at the first sign of illness. A young baby's cold can become more serious very quickly. It can develop into a serious problem such as pneumonia.   Ease congestion  · Use a cool-mist vaporizer to help loosen mucus. Don’t use a hot-steam vaporizer with a young child who could get burned. Make sure to clean the vaporizer often to help prevent mold growth.  · Try over-the-counter saline nasal sprays. They’re safe for children. These are not the same as nasal decongestant sprays, which may make symptoms worse. Talk with the pharmacist if you have questions about what to use.  · Use a bulb syringe to clear the nose of a child too young to blow his or her nose. Wash the bulb syringe often in hot, soapy water. Be sure to rinse out all of the soap and drain all of the water before using it again.    Soothe a sore throat  · Offer plenty of liquids to keep the throat moist and reduce pain. Good choices include ice chips, water, or frozen fruit bars.  · Give children age 4 or older throat drops or lozenges to keep the throat moist and soothe pain.  · Give ibuprofen or acetaminophen as advised by your child's healthcare provider to relieve pain. Never give aspirin to a child under age 18 who has a cold or flu. It could cause a rare but serious condition called Reye syndrome.    Before you give  your child medicine  Cold and cough medicines should not be used for children under the age of 6, according to the American Academy of Pediatrics. These medicines don't work on young children and may cause harmful side effects. If your child is age 6 or older, use care when giving cold and cough medicines. Always follow your doctor’s advice.   Quiet a cough  · Serve warm fluids such as clear soups to help loosen mucus.  · Use a cool-mist vaporizer to ease croup. Croup causes dry, barking coughs.  · Use cough medicine for children age 6 or older only if advised by your child’s healthcare provider.    Preventing colds  To help children stay healthy:  · Teach children to wash their hands often. This includes before eating and after using the bathroom, playing with animals, or coughing or sneezing. Carry an alcohol-based hand gel containing at least 60% alcohol. This is for times when soap and water aren’t available.  · Remind children not to touch their eyes, nose, and mouth.  · Throw tissues away right after they are used. Then wash your hands.  · Don't let children share drinking cups, utensils, or pacifiers  Tips for correct handwashing  Use clean, running water and plenty of soap. Work up a good lather.   · Clean the whole hand, under the nails, between the fingers, and up the wrists.  · Wash for at least 10 to 15 seconds. This is about as long as it takes to say the alphabet or sing “Happy Birthday.” Don’t just wash. Scrub well.  · Rinse well. Let the water run down the fingers, not up the wrists.  · In a public restroom, use a paper towel to turn off the faucet and open the door.  When to call the doctor  Call your child's healthcare provider right away if your child has any of these fever symptoms:   · Fever (see Children and fever, below)  · Your child looks very ill or is unusually fussy or drowsy  · Severe ear pain or sore throat  · Unexplained rash  · Repeated vomiting and diarrhea  · A stiff neck or severe  headache  · Persistent brown, green, or bloody mucus  · Signs of dehydration, which include severe thirst, dark yellow urine, infrequent urination, dull eyes, dry skin, and dry or cracked lips.  · Your child's symptoms seem to be getting worse  · Your child doesn’t look or act right to you  Call 911  Call 911 if your child has:   · Rapid breathing or shortness of breath  · Trouble swallowing  · Signs of severe dehydration, which include sunken eyes, no tears or urine, and lethargic  · Blue, purple or gray color skin, lips, or fingernails  Fever and children  Always use a digital thermometer to check your child’s temperature. Never use a mercury thermometer.   For babies and toddlers, be sure to use a rectal thermometer correctly. A rectal thermometer may accidentally poke a hole in (perforate) the rectum. It may also pass on germs from the stool. Always follow the product maker’s directions for proper use. If you don’t feel comfortable taking a rectal temperature, use another method. When you talk to your child’s healthcare provider, tell him or her which method you used to take your child’s temperature.   Here are guidelines for fever temperature. Ear temperatures aren’t accurate before 6 months of age. Don’t take an oral temperature until your child is at least 4 years old.   Baby under 3 months old:  · Ask your child's healthcare provider how you should take the temperature.  · Rectal or forehead (temporal artery) temperature of 100.4°F (38°C) or higher, or as directed by the provider  · Armpit temperature of 99°F (37.2°C) or higher, or as directed by the provider    Child age 3 to 36 months:  · Rectal, forehead (temporal artery) temperature of 102°F (38.9°C) or higher, or as directed by the provider  · Armpit temperature of 101°F (38.3°C) or higher, or as directed by the provider  Child of any age:  · Repeated temperature of 104°F (40°C) or higher, or as directed by the provider  · Fever that lasts more than 24  hours in a child under 2 years old. Or a fever that lasts for 3 days in a child 2 years or older.      [employer] last reviewed this educational content on 9/1/2019  © 5325-7231 [employer]. All rights reserved. This information is not intended as a substitute for professional medical care. Always follow your healthcare professional's instructions.            162.9

## 2022-01-01 ENCOUNTER — TRANSCRIPTION ENCOUNTER (OUTPATIENT)
Age: 77
End: 2022-01-01

## 2022-01-01 ENCOUNTER — OUTPATIENT (OUTPATIENT)
Dept: OUTPATIENT SERVICES | Facility: HOSPITAL | Age: 77
LOS: 1 days | End: 2022-01-01
Payer: MEDICARE

## 2022-01-01 ENCOUNTER — APPOINTMENT (OUTPATIENT)
Dept: HEMATOLOGY ONCOLOGY | Facility: CLINIC | Age: 77
End: 2022-01-01

## 2022-01-01 ENCOUNTER — NON-APPOINTMENT (OUTPATIENT)
Age: 77
End: 2022-01-01

## 2022-01-01 ENCOUNTER — OUTPATIENT (OUTPATIENT)
Dept: OUTPATIENT SERVICES | Facility: HOSPITAL | Age: 77
LOS: 1 days | Discharge: HOME | End: 2022-01-01

## 2022-01-01 ENCOUNTER — LABORATORY RESULT (OUTPATIENT)
Age: 77
End: 2022-01-01

## 2022-01-01 ENCOUNTER — APPOINTMENT (OUTPATIENT)
Dept: HEPATOLOGY | Facility: CLINIC | Age: 77
End: 2022-01-01
Payer: MEDICARE

## 2022-01-01 ENCOUNTER — APPOINTMENT (OUTPATIENT)
Dept: INTERVENTIONAL RADIOLOGY/VASCULAR | Facility: CLINIC | Age: 77
End: 2022-01-01

## 2022-01-01 ENCOUNTER — APPOINTMENT (OUTPATIENT)
Dept: ULTRASOUND IMAGING | Facility: IMAGING CENTER | Age: 77
End: 2022-01-01
Payer: MEDICARE

## 2022-01-01 ENCOUNTER — INPATIENT (INPATIENT)
Facility: HOSPITAL | Age: 77
LOS: 11 days | Discharge: HOME | DRG: 423 | End: 2022-01-13
Attending: INTERNAL MEDICINE | Admitting: INTERNAL MEDICINE
Payer: MEDICARE

## 2022-01-01 ENCOUNTER — RESULT REVIEW (OUTPATIENT)
Age: 77
End: 2022-01-01

## 2022-01-01 VITALS
RESPIRATION RATE: 16 BRPM | HEIGHT: 65 IN | SYSTOLIC BLOOD PRESSURE: 116 MMHG | DIASTOLIC BLOOD PRESSURE: 65 MMHG | OXYGEN SATURATION: 98 % | HEART RATE: 107 BPM | WEIGHT: 139.99 LBS | TEMPERATURE: 98 F

## 2022-01-01 VITALS
WEIGHT: 121 LBS | HEART RATE: 107 BPM | OXYGEN SATURATION: 99 % | RESPIRATION RATE: 14 BRPM | DIASTOLIC BLOOD PRESSURE: 81 MMHG | TEMPERATURE: 96.9 F | SYSTOLIC BLOOD PRESSURE: 120 MMHG | HEIGHT: 63 IN | BODY MASS INDEX: 21.44 KG/M2

## 2022-01-01 VITALS
DIASTOLIC BLOOD PRESSURE: 60 MMHG | HEART RATE: 78 BPM | SYSTOLIC BLOOD PRESSURE: 115 MMHG | RESPIRATION RATE: 14 BRPM | OXYGEN SATURATION: 99 %

## 2022-01-01 VITALS
OXYGEN SATURATION: 99 % | HEART RATE: 91 BPM | DIASTOLIC BLOOD PRESSURE: 81 MMHG | TEMPERATURE: 98 F | RESPIRATION RATE: 18 BRPM | SYSTOLIC BLOOD PRESSURE: 136 MMHG

## 2022-01-01 VITALS
WEIGHT: 137 LBS | HEIGHT: 63 IN | RESPIRATION RATE: 14 BRPM | HEART RATE: 116 BPM | DIASTOLIC BLOOD PRESSURE: 92 MMHG | TEMPERATURE: 96.9 F | OXYGEN SATURATION: 99 % | SYSTOLIC BLOOD PRESSURE: 142 MMHG | BODY MASS INDEX: 24.27 KG/M2

## 2022-01-01 VITALS
DIASTOLIC BLOOD PRESSURE: 74 MMHG | TEMPERATURE: 98 F | OXYGEN SATURATION: 100 % | SYSTOLIC BLOOD PRESSURE: 111 MMHG | HEART RATE: 100 BPM | RESPIRATION RATE: 18 BRPM

## 2022-01-01 DIAGNOSIS — N89.8 OTHER SPECIFIED NONINFLAMMATORY DISORDERS OF VAGINA: Chronic | ICD-10-CM

## 2022-01-01 DIAGNOSIS — Z23 ENCOUNTER FOR IMMUNIZATION: ICD-10-CM

## 2022-01-01 DIAGNOSIS — R18.8 OTHER ASCITES: ICD-10-CM

## 2022-01-01 DIAGNOSIS — C22.0 LIVER CELL CARCINOMA: ICD-10-CM

## 2022-01-01 DIAGNOSIS — B19.20 UNSPECIFIED VIRAL HEPATITIS C W/OUT HEPATIC COMA: ICD-10-CM

## 2022-01-01 DIAGNOSIS — Z71.89 OTHER SPECIFIED COUNSELING: ICD-10-CM

## 2022-01-01 DIAGNOSIS — D64.9 ANEMIA, UNSPECIFIED: ICD-10-CM

## 2022-01-01 DIAGNOSIS — Z51.5 ENCOUNTER FOR PALLIATIVE CARE: ICD-10-CM

## 2022-01-01 DIAGNOSIS — Z11.52 ENCOUNTER FOR SCREENING FOR COVID-19: ICD-10-CM

## 2022-01-01 DIAGNOSIS — K74.60 UNSPECIFIED CIRRHOSIS OF LIVER: ICD-10-CM

## 2022-01-01 DIAGNOSIS — R77.2 ABNORMALITY OF ALPHAFETOPROTEIN: ICD-10-CM

## 2022-01-01 DIAGNOSIS — R17 UNSPECIFIED JAUNDICE: ICD-10-CM

## 2022-01-01 DIAGNOSIS — R10.9 UNSPECIFIED ABDOMINAL PAIN: ICD-10-CM

## 2022-01-01 LAB
ALBUMIN FLD-MCNC: 1 G/DL — SIGNIFICANT CHANGE UP
ALBUMIN FLD-MCNC: 1 G/DL — SIGNIFICANT CHANGE UP
ALBUMIN SERPL ELPH-MCNC: 2.7 G/DL
ALBUMIN SERPL ELPH-MCNC: 2.7 G/DL — LOW (ref 3.3–5)
ALBUMIN SERPL ELPH-MCNC: 2.9 G/DL
ALBUMIN SERPL ELPH-MCNC: 2.9 G/DL — LOW (ref 3.3–5)
ALBUMIN SERPL ELPH-MCNC: 3 G/DL — LOW (ref 3.3–5)
ALBUMIN SERPL ELPH-MCNC: 3 G/DL — LOW (ref 3.3–5)
ALBUMIN SERPL ELPH-MCNC: 3.1 G/DL — LOW (ref 3.3–5)
ALBUMIN SERPL ELPH-MCNC: 3.5 G/DL — SIGNIFICANT CHANGE UP (ref 3.3–5)
ALP BLD-CCNC: 83 U/L
ALP BLD-CCNC: 93 U/L
ALP SERPL-CCNC: 116 U/L — SIGNIFICANT CHANGE UP (ref 40–120)
ALP SERPL-CCNC: 123 U/L — HIGH (ref 40–120)
ALP SERPL-CCNC: 70 U/L — SIGNIFICANT CHANGE UP (ref 40–120)
ALP SERPL-CCNC: 81 U/L — SIGNIFICANT CHANGE UP (ref 40–120)
ALP SERPL-CCNC: 87 U/L — SIGNIFICANT CHANGE UP (ref 40–120)
ALP SERPL-CCNC: 87 U/L — SIGNIFICANT CHANGE UP (ref 40–120)
ALPHA-1-FETOPROTEIN-L3: 72.3 %
ALPHA-1-FETOPROTEIN: ABNORMAL NG/ML
ALT FLD-CCNC: 100 U/L — HIGH (ref 10–45)
ALT FLD-CCNC: 101 U/L — HIGH (ref 10–45)
ALT FLD-CCNC: 122 U/L — HIGH (ref 10–45)
ALT FLD-CCNC: 193 U/L — HIGH (ref 10–45)
ALT FLD-CCNC: 82 U/L — HIGH (ref 10–45)
ALT FLD-CCNC: 87 U/L — HIGH (ref 10–45)
ALT SERPL-CCNC: 71 U/L
ALT SERPL-CCNC: 71 U/L
ANION GAP SERPL CALC-SCNC: 10 MMOL/L — SIGNIFICANT CHANGE UP (ref 5–17)
ANION GAP SERPL CALC-SCNC: 10 MMOL/L — SIGNIFICANT CHANGE UP (ref 5–17)
ANION GAP SERPL CALC-SCNC: 11 MMOL/L — SIGNIFICANT CHANGE UP (ref 5–17)
ANION GAP SERPL CALC-SCNC: 12 MMOL/L
ANION GAP SERPL CALC-SCNC: 12 MMOL/L — SIGNIFICANT CHANGE UP (ref 5–17)
ANION GAP SERPL CALC-SCNC: 13 MMOL/L — SIGNIFICANT CHANGE UP (ref 5–17)
ANION GAP SERPL CALC-SCNC: 15 MMOL/L — SIGNIFICANT CHANGE UP (ref 5–17)
ANION GAP SERPL CALC-SCNC: 17 MMOL/L
ANION GAP SERPL CALC-SCNC: 9 MMOL/L — SIGNIFICANT CHANGE UP (ref 5–17)
APPEARANCE UR: CLEAR — SIGNIFICANT CHANGE UP
APTT BLD: 30.6 SEC — SIGNIFICANT CHANGE UP (ref 27.5–35.5)
APTT BLD: 30.9 SEC — SIGNIFICANT CHANGE UP (ref 27.5–35.5)
APTT BLD: 32.1 SEC — SIGNIFICANT CHANGE UP (ref 27.5–35.5)
APTT BLD: 33.4 SEC — SIGNIFICANT CHANGE UP (ref 27.5–35.5)
APTT BLD: 34.4 SEC — SIGNIFICANT CHANGE UP (ref 27.5–35.5)
AST SERPL-CCNC: 256 U/L
AST SERPL-CCNC: 278 U/L
AST SERPL-CCNC: 312 U/L — HIGH (ref 10–40)
AST SERPL-CCNC: 337 U/L — HIGH (ref 10–40)
AST SERPL-CCNC: 341 U/L — HIGH (ref 10–40)
AST SERPL-CCNC: 390 U/L — HIGH (ref 10–40)
AST SERPL-CCNC: 489 U/L — HIGH (ref 10–40)
AST SERPL-CCNC: 753 U/L — HIGH (ref 10–40)
B PERT IGG+IGM PNL SER: ABNORMAL
B PERT IGG+IGM PNL SER: ABNORMAL
BACTERIA # UR AUTO: NEGATIVE — SIGNIFICANT CHANGE UP
BASE EXCESS BLDV CALC-SCNC: -1.3 MMOL/L — SIGNIFICANT CHANGE UP (ref -2–2)
BASOPHILS # BLD AUTO: 0.01 K/UL — SIGNIFICANT CHANGE UP (ref 0–0.2)
BASOPHILS # BLD AUTO: 0.01 K/UL — SIGNIFICANT CHANGE UP (ref 0–0.2)
BASOPHILS # BLD AUTO: 0.02 K/UL — SIGNIFICANT CHANGE UP (ref 0–0.2)
BASOPHILS # BLD AUTO: 0.03 K/UL
BASOPHILS # BLD AUTO: 0.03 K/UL
BASOPHILS NFR BLD AUTO: 0.4 % — SIGNIFICANT CHANGE UP (ref 0–2)
BASOPHILS NFR BLD AUTO: 0.5 % — SIGNIFICANT CHANGE UP (ref 0–2)
BASOPHILS NFR BLD AUTO: 0.5 % — SIGNIFICANT CHANGE UP (ref 0–2)
BASOPHILS NFR BLD AUTO: 0.9 %
BASOPHILS NFR BLD AUTO: 1 %
BILIRUB DIRECT SERPL-MCNC: 1.6 MG/DL — HIGH (ref 0–0.3)
BILIRUB DIRECT SERPL-MCNC: 1.8 MG/DL — HIGH (ref 0–0.3)
BILIRUB DIRECT SERPL-MCNC: 1.9 MG/DL — HIGH (ref 0–0.3)
BILIRUB INDIRECT FLD-MCNC: 1.3 MG/DL — HIGH (ref 0.2–1)
BILIRUB INDIRECT FLD-MCNC: 1.4 MG/DL — HIGH (ref 0.2–1)
BILIRUB SERPL-MCNC: 2.5 MG/DL — HIGH (ref 0.2–1.2)
BILIRUB SERPL-MCNC: 2.9 MG/DL — HIGH (ref 0.2–1.2)
BILIRUB SERPL-MCNC: 3 MG/DL — HIGH (ref 0.2–1.2)
BILIRUB SERPL-MCNC: 3.1 MG/DL — HIGH (ref 0.2–1.2)
BILIRUB SERPL-MCNC: 3.3 MG/DL — HIGH (ref 0.2–1.2)
BILIRUB SERPL-MCNC: 3.3 MG/DL — HIGH (ref 0.2–1.2)
BILIRUB SERPL-MCNC: 3.4 MG/DL — HIGH (ref 0.2–1.2)
BILIRUB SERPL-MCNC: 4.2 MG/DL
BILIRUB SERPL-MCNC: 4.2 MG/DL
BILIRUB UR-MCNC: NEGATIVE — SIGNIFICANT CHANGE UP
BLD GP AB SCN SERPL QL: NEGATIVE — SIGNIFICANT CHANGE UP
BUN SERPL-MCNC: 12 MG/DL — SIGNIFICANT CHANGE UP (ref 7–23)
BUN SERPL-MCNC: 14 MG/DL — SIGNIFICANT CHANGE UP (ref 7–23)
BUN SERPL-MCNC: 16 MG/DL — SIGNIFICANT CHANGE UP (ref 7–23)
BUN SERPL-MCNC: 18 MG/DL — SIGNIFICANT CHANGE UP (ref 7–23)
BUN SERPL-MCNC: 25 MG/DL — HIGH (ref 7–23)
BUN SERPL-MCNC: 30 MG/DL — HIGH (ref 7–23)
BUN SERPL-MCNC: 31 MG/DL — HIGH (ref 7–23)
BUN SERPL-MCNC: 32 MG/DL — HIGH (ref 7–23)
BUN SERPL-MCNC: 9 MG/DL
BUN SERPL-MCNC: 9 MG/DL
BUN SERPL-MCNC: 9 MG/DL — SIGNIFICANT CHANGE UP (ref 7–23)
CA-I SERPL-SCNC: 1.17 MMOL/L — SIGNIFICANT CHANGE UP (ref 1.15–1.33)
CALCIUM SERPL-MCNC: 7.8 MG/DL — LOW (ref 8.4–10.5)
CALCIUM SERPL-MCNC: 7.9 MG/DL — LOW (ref 8.4–10.5)
CALCIUM SERPL-MCNC: 8 MG/DL — LOW (ref 8.4–10.5)
CALCIUM SERPL-MCNC: 8.1 MG/DL — LOW (ref 8.4–10.5)
CALCIUM SERPL-MCNC: 8.1 MG/DL — LOW (ref 8.4–10.5)
CALCIUM SERPL-MCNC: 8.2 MG/DL — LOW (ref 8.4–10.5)
CALCIUM SERPL-MCNC: 8.2 MG/DL — LOW (ref 8.4–10.5)
CALCIUM SERPL-MCNC: 8.3 MG/DL — LOW (ref 8.4–10.5)
CALCIUM SERPL-MCNC: 8.5 MG/DL — SIGNIFICANT CHANGE UP (ref 8.4–10.5)
CALCIUM SERPL-MCNC: 8.6 MG/DL
CALCIUM SERPL-MCNC: 8.6 MG/DL
CALCIUM SERPL-MCNC: 8.9 MG/DL — SIGNIFICANT CHANGE UP (ref 8.4–10.5)
CHLORIDE BLDV-SCNC: 105 MMOL/L — SIGNIFICANT CHANGE UP (ref 96–108)
CHLORIDE SERPL-SCNC: 101 MMOL/L
CHLORIDE SERPL-SCNC: 102 MMOL/L — SIGNIFICANT CHANGE UP (ref 96–108)
CHLORIDE SERPL-SCNC: 103 MMOL/L — SIGNIFICANT CHANGE UP (ref 96–108)
CHLORIDE SERPL-SCNC: 103 MMOL/L — SIGNIFICANT CHANGE UP (ref 96–108)
CHLORIDE SERPL-SCNC: 104 MMOL/L — SIGNIFICANT CHANGE UP (ref 96–108)
CHLORIDE SERPL-SCNC: 106 MMOL/L — SIGNIFICANT CHANGE UP (ref 96–108)
CHLORIDE SERPL-SCNC: 106 MMOL/L — SIGNIFICANT CHANGE UP (ref 96–108)
CHLORIDE SERPL-SCNC: 107 MMOL/L — SIGNIFICANT CHANGE UP (ref 96–108)
CHLORIDE SERPL-SCNC: 109 MMOL/L — HIGH (ref 96–108)
CHLORIDE SERPL-SCNC: 111 MMOL/L — HIGH (ref 96–108)
CHLORIDE SERPL-SCNC: 98 MMOL/L — SIGNIFICANT CHANGE UP (ref 96–108)
CHLORIDE SERPL-SCNC: 99 MMOL/L
CO2 BLDV-SCNC: 25 MMOL/L — SIGNIFICANT CHANGE UP (ref 22–26)
CO2 SERPL-SCNC: 17 MMOL/L — LOW (ref 22–31)
CO2 SERPL-SCNC: 19 MMOL/L
CO2 SERPL-SCNC: 19 MMOL/L — LOW (ref 22–31)
CO2 SERPL-SCNC: 20 MMOL/L
CO2 SERPL-SCNC: 20 MMOL/L — LOW (ref 22–31)
CO2 SERPL-SCNC: 20 MMOL/L — LOW (ref 22–31)
CO2 SERPL-SCNC: 21 MMOL/L — LOW (ref 22–31)
CO2 SERPL-SCNC: 22 MMOL/L — SIGNIFICANT CHANGE UP (ref 22–31)
CO2 SERPL-SCNC: 22 MMOL/L — SIGNIFICANT CHANGE UP (ref 22–31)
CO2 SERPL-SCNC: 23 MMOL/L — SIGNIFICANT CHANGE UP (ref 22–31)
CO2 SERPL-SCNC: 23 MMOL/L — SIGNIFICANT CHANGE UP (ref 22–31)
CO2 SERPL-SCNC: 26 MMOL/L — SIGNIFICANT CHANGE UP (ref 22–31)
COLOR FLD: SIGNIFICANT CHANGE UP
COLOR FLD: SIGNIFICANT CHANGE UP
COLOR SPEC: YELLOW — SIGNIFICANT CHANGE UP
CREAT SERPL-MCNC: 0.83 MG/DL — SIGNIFICANT CHANGE UP (ref 0.5–1.3)
CREAT SERPL-MCNC: 0.86 MG/DL — SIGNIFICANT CHANGE UP (ref 0.5–1.3)
CREAT SERPL-MCNC: 0.89 MG/DL — SIGNIFICANT CHANGE UP (ref 0.5–1.3)
CREAT SERPL-MCNC: 0.89 MG/DL — SIGNIFICANT CHANGE UP (ref 0.5–1.3)
CREAT SERPL-MCNC: 0.9 MG/DL — SIGNIFICANT CHANGE UP (ref 0.5–1.3)
CREAT SERPL-MCNC: 0.9 MG/DL — SIGNIFICANT CHANGE UP (ref 0.5–1.3)
CREAT SERPL-MCNC: 0.92 MG/DL — SIGNIFICANT CHANGE UP (ref 0.5–1.3)
CREAT SERPL-MCNC: 0.94 MG/DL — SIGNIFICANT CHANGE UP (ref 0.5–1.3)
CREAT SERPL-MCNC: 0.98 MG/DL
CREAT SERPL-MCNC: 1 MG/DL
CREAT SERPL-MCNC: 1.05 MG/DL — SIGNIFICANT CHANGE UP (ref 0.5–1.3)
CREAT SERPL-MCNC: 1.5 MG/DL — HIGH (ref 0.5–1.3)
CREAT SERPL-MCNC: 1.57 MG/DL — HIGH (ref 0.5–1.3)
CREAT SERPL-MCNC: 1.9 MG/DL — HIGH (ref 0.5–1.3)
CULTURE RESULTS: NO GROWTH — SIGNIFICANT CHANGE UP
CULTURE RESULTS: SIGNIFICANT CHANGE UP
DIFF PNL FLD: NEGATIVE — SIGNIFICANT CHANGE UP
EOSINOPHIL # BLD AUTO: 0.01 K/UL — SIGNIFICANT CHANGE UP (ref 0–0.5)
EOSINOPHIL # BLD AUTO: 0.03 K/UL — SIGNIFICANT CHANGE UP (ref 0–0.5)
EOSINOPHIL # BLD AUTO: 0.04 K/UL
EOSINOPHIL # BLD AUTO: 0.04 K/UL — SIGNIFICANT CHANGE UP (ref 0–0.5)
EOSINOPHIL # BLD AUTO: 0.05 K/UL
EOSINOPHIL NFR BLD AUTO: 0.2 % — SIGNIFICANT CHANGE UP (ref 0–6)
EOSINOPHIL NFR BLD AUTO: 1.2 % — SIGNIFICANT CHANGE UP (ref 0–6)
EOSINOPHIL NFR BLD AUTO: 1.3 %
EOSINOPHIL NFR BLD AUTO: 1.5 %
EOSINOPHIL NFR BLD AUTO: 1.8 % — SIGNIFICANT CHANGE UP (ref 0–6)
EPI CELLS # UR: 1 /HPF — SIGNIFICANT CHANGE UP
FIBRINOGEN PPP-MCNC: 182 MG/DL — LOW (ref 290–520)
FIBRINOGEN PPP-MCNC: 198 MG/DL — LOW (ref 290–520)
FLUID INTAKE SUBSTANCE CLASS: SIGNIFICANT CHANGE UP
FLUID INTAKE SUBSTANCE CLASS: SIGNIFICANT CHANGE UP
FLUID SEGMENTED GRANULOCYTES: 0 % — SIGNIFICANT CHANGE UP
FLUID SEGMENTED GRANULOCYTES: 2 % — SIGNIFICANT CHANGE UP
GAS PNL BLDV: 136 MMOL/L — SIGNIFICANT CHANGE UP (ref 136–145)
GAS PNL BLDV: SIGNIFICANT CHANGE UP
GLUCOSE BLDC GLUCOMTR-MCNC: 87 MG/DL — SIGNIFICANT CHANGE UP (ref 70–99)
GLUCOSE BLDV-MCNC: 101 MG/DL — HIGH (ref 70–99)
GLUCOSE FLD-MCNC: 63 MG/DL — SIGNIFICANT CHANGE UP
GLUCOSE FLD-MCNC: 78 MG/DL — SIGNIFICANT CHANGE UP
GLUCOSE SERPL-MCNC: 103 MG/DL — HIGH (ref 70–99)
GLUCOSE SERPL-MCNC: 67 MG/DL — LOW (ref 70–99)
GLUCOSE SERPL-MCNC: 69 MG/DL — LOW (ref 70–99)
GLUCOSE SERPL-MCNC: 72 MG/DL — SIGNIFICANT CHANGE UP (ref 70–99)
GLUCOSE SERPL-MCNC: 76 MG/DL — SIGNIFICANT CHANGE UP (ref 70–99)
GLUCOSE SERPL-MCNC: 78 MG/DL — SIGNIFICANT CHANGE UP (ref 70–99)
GLUCOSE SERPL-MCNC: 80 MG/DL — SIGNIFICANT CHANGE UP (ref 70–99)
GLUCOSE SERPL-MCNC: 82 MG/DL — SIGNIFICANT CHANGE UP (ref 70–99)
GLUCOSE SERPL-MCNC: 87 MG/DL — SIGNIFICANT CHANGE UP (ref 70–99)
GLUCOSE SERPL-MCNC: 89 MG/DL — SIGNIFICANT CHANGE UP (ref 70–99)
GLUCOSE SERPL-MCNC: 91 MG/DL — SIGNIFICANT CHANGE UP (ref 70–99)
GLUCOSE SERPL-MCNC: 93 MG/DL
GLUCOSE SERPL-MCNC: 94 MG/DL — SIGNIFICANT CHANGE UP (ref 70–99)
GLUCOSE SERPL-MCNC: 96 MG/DL
GLUCOSE UR QL: NEGATIVE — SIGNIFICANT CHANGE UP
GRAM STN FLD: SIGNIFICANT CHANGE UP
GRAM STN FLD: SIGNIFICANT CHANGE UP
HAPTOGLOB SERPL-MCNC: <20 MG/DL — LOW (ref 34–200)
HCO3 BLDV-SCNC: 24 MMOL/L — SIGNIFICANT CHANGE UP (ref 22–29)
HCT VFR BLD CALC: 21.1 % — LOW (ref 34.5–45)
HCT VFR BLD CALC: 22.1 % — LOW (ref 34.5–45)
HCT VFR BLD CALC: 22.7 % — LOW (ref 34.5–45)
HCT VFR BLD CALC: 23.2 % — LOW (ref 34.5–45)
HCT VFR BLD CALC: 23.5 % — LOW (ref 34.5–45)
HCT VFR BLD CALC: 23.6 % — LOW (ref 34.5–45)
HCT VFR BLD CALC: 23.8 % — LOW (ref 34.5–45)
HCT VFR BLD CALC: 24.1 % — LOW (ref 34.5–45)
HCT VFR BLD CALC: 24.6 % — LOW (ref 34.5–45)
HCT VFR BLD CALC: 24.7 % — LOW (ref 34.5–45)
HCT VFR BLD CALC: 25.3 % — LOW (ref 34.5–45)
HCT VFR BLD CALC: 25.8 % — LOW (ref 34.5–45)
HCT VFR BLD CALC: 26.3 % — LOW (ref 34.5–45)
HCT VFR BLD CALC: 26.4 % — LOW (ref 34.5–45)
HCT VFR BLD CALC: 27.3 % — LOW (ref 34.5–45)
HCT VFR BLD CALC: 28.6 %
HCT VFR BLD CALC: 30.1 %
HCT VFR BLD CALC: 30.1 % — LOW (ref 34.5–45)
HCT VFR BLD CALC: 31.7 % — LOW (ref 34.5–45)
HCT VFR BLDA CALC: 23 % — LOW (ref 34.5–46.5)
HCV AB S/CO SERPL IA: 17.52 S/CO — HIGH (ref 0–0.99)
HCV AB SERPL-IMP: REACTIVE
HCV RNA FLD QL NAA+PROBE: SIGNIFICANT CHANGE UP
HGB BLD CALC-MCNC: 7.5 G/DL — LOW (ref 11.7–16.1)
HGB BLD-MCNC: 10.6 G/DL — LOW (ref 11.5–15.5)
HGB BLD-MCNC: 6.8 G/DL — CRITICAL LOW (ref 11.5–15.5)
HGB BLD-MCNC: 7.1 G/DL — LOW (ref 11.5–15.5)
HGB BLD-MCNC: 7.4 G/DL — LOW (ref 11.5–15.5)
HGB BLD-MCNC: 7.5 G/DL — LOW (ref 11.5–15.5)
HGB BLD-MCNC: 7.5 G/DL — LOW (ref 11.5–15.5)
HGB BLD-MCNC: 7.7 G/DL — LOW (ref 11.5–15.5)
HGB BLD-MCNC: 7.8 G/DL — LOW (ref 11.5–15.5)
HGB BLD-MCNC: 7.9 G/DL — LOW (ref 11.5–15.5)
HGB BLD-MCNC: 8 G/DL — LOW (ref 11.5–15.5)
HGB BLD-MCNC: 8 G/DL — LOW (ref 11.5–15.5)
HGB BLD-MCNC: 8.3 G/DL — LOW (ref 11.5–15.5)
HGB BLD-MCNC: 8.3 G/DL — LOW (ref 11.5–15.5)
HGB BLD-MCNC: 8.4 G/DL — LOW (ref 11.5–15.5)
HGB BLD-MCNC: 8.5 G/DL — LOW (ref 11.5–15.5)
HGB BLD-MCNC: 8.8 G/DL — LOW (ref 11.5–15.5)
HGB BLD-MCNC: 9.3 G/DL
HGB BLD-MCNC: 9.3 G/DL
HGB BLD-MCNC: 9.9 G/DL — LOW (ref 11.5–15.5)
HYALINE CASTS # UR AUTO: 12 /LPF — HIGH (ref 0–2)
IMM GRANULOCYTES NFR BLD AUTO: 0.4 % — SIGNIFICANT CHANGE UP (ref 0–1.5)
IMM GRANULOCYTES NFR BLD AUTO: 0.5 % — SIGNIFICANT CHANGE UP (ref 0–1.5)
IMM GRANULOCYTES NFR BLD AUTO: 0.6 %
IMM GRANULOCYTES NFR BLD AUTO: 0.7 % — SIGNIFICANT CHANGE UP (ref 0–1.5)
IMM GRANULOCYTES NFR BLD AUTO: 0.9 %
INR BLD: 1.33 RATIO — HIGH (ref 0.88–1.16)
INR BLD: 1.39 RATIO — HIGH (ref 0.88–1.16)
INR BLD: 1.39 RATIO — HIGH (ref 0.88–1.16)
INR BLD: 1.45 RATIO — HIGH (ref 0.88–1.16)
INR BLD: 1.47 RATIO — HIGH (ref 0.88–1.16)
INR BLD: 1.51 RATIO — HIGH (ref 0.88–1.16)
INR BLD: 1.56 RATIO — HIGH (ref 0.88–1.16)
INR PPP: 1.44 RATIO
INR PPP: 1.49 RATIO
KETONES UR-MCNC: NEGATIVE — SIGNIFICANT CHANGE UP
LACTATE BLDV-MCNC: 2.6 MMOL/L — HIGH (ref 0.7–2)
LDH SERPL L TO P-CCNC: 121 U/L — SIGNIFICANT CHANGE UP
LDH SERPL L TO P-CCNC: 316 U/L — HIGH (ref 50–242)
LDH SERPL L TO P-CCNC: 92 U/L — SIGNIFICANT CHANGE UP
LEUKOCYTE ESTERASE UR-ACNC: NEGATIVE — SIGNIFICANT CHANGE UP
LIDOCAIN IGE QN: 43 U/L — SIGNIFICANT CHANGE UP (ref 7–60)
LYMPHOCYTES # BLD AUTO: 0.39 K/UL
LYMPHOCYTES # BLD AUTO: 0.41 K/UL
LYMPHOCYTES # BLD AUTO: 0.54 K/UL — LOW (ref 1–3.3)
LYMPHOCYTES # BLD AUTO: 0.55 K/UL — LOW (ref 1–3.3)
LYMPHOCYTES # BLD AUTO: 0.66 K/UL — LOW (ref 1–3.3)
LYMPHOCYTES # BLD AUTO: 15.3 % — SIGNIFICANT CHANGE UP (ref 13–44)
LYMPHOCYTES # BLD AUTO: 21.2 % — SIGNIFICANT CHANGE UP (ref 13–44)
LYMPHOCYTES # BLD AUTO: 24.9 % — SIGNIFICANT CHANGE UP (ref 13–44)
LYMPHOCYTES # FLD: 22 % — SIGNIFICANT CHANGE UP
LYMPHOCYTES # FLD: 33 % — SIGNIFICANT CHANGE UP
LYMPHOCYTES NFR BLD AUTO: 11.5 %
LYMPHOCYTES NFR BLD AUTO: 13.1 %
MAGNESIUM SERPL-MCNC: 2.3 MG/DL — SIGNIFICANT CHANGE UP (ref 1.6–2.6)
MAN DIFF?: NORMAL
MAN DIFF?: NORMAL
MCHC RBC-ENTMCNC: 29.5 PG — SIGNIFICANT CHANGE UP (ref 27–34)
MCHC RBC-ENTMCNC: 29.7 PG
MCHC RBC-ENTMCNC: 29.7 PG — SIGNIFICANT CHANGE UP (ref 27–34)
MCHC RBC-ENTMCNC: 29.8 PG — SIGNIFICANT CHANGE UP (ref 27–34)
MCHC RBC-ENTMCNC: 29.9 PG
MCHC RBC-ENTMCNC: 29.9 PG — SIGNIFICANT CHANGE UP (ref 27–34)
MCHC RBC-ENTMCNC: 29.9 PG — SIGNIFICANT CHANGE UP (ref 27–34)
MCHC RBC-ENTMCNC: 30 PG — SIGNIFICANT CHANGE UP (ref 27–34)
MCHC RBC-ENTMCNC: 30 PG — SIGNIFICANT CHANGE UP (ref 27–34)
MCHC RBC-ENTMCNC: 30.1 PG — SIGNIFICANT CHANGE UP (ref 27–34)
MCHC RBC-ENTMCNC: 30.1 PG — SIGNIFICANT CHANGE UP (ref 27–34)
MCHC RBC-ENTMCNC: 30.3 PG — SIGNIFICANT CHANGE UP (ref 27–34)
MCHC RBC-ENTMCNC: 30.4 PG — SIGNIFICANT CHANGE UP (ref 27–34)
MCHC RBC-ENTMCNC: 30.4 PG — SIGNIFICANT CHANGE UP (ref 27–34)
MCHC RBC-ENTMCNC: 30.5 PG — SIGNIFICANT CHANGE UP (ref 27–34)
MCHC RBC-ENTMCNC: 30.6 PG — SIGNIFICANT CHANGE UP (ref 27–34)
MCHC RBC-ENTMCNC: 30.7 PG — SIGNIFICANT CHANGE UP (ref 27–34)
MCHC RBC-ENTMCNC: 30.9 GM/DL
MCHC RBC-ENTMCNC: 31.8 GM/DL — LOW (ref 32–36)
MCHC RBC-ENTMCNC: 31.8 GM/DL — LOW (ref 32–36)
MCHC RBC-ENTMCNC: 31.9 GM/DL — LOW (ref 32–36)
MCHC RBC-ENTMCNC: 32.1 GM/DL — SIGNIFICANT CHANGE UP (ref 32–36)
MCHC RBC-ENTMCNC: 32.1 GM/DL — SIGNIFICANT CHANGE UP (ref 32–36)
MCHC RBC-ENTMCNC: 32.2 GM/DL — SIGNIFICANT CHANGE UP (ref 32–36)
MCHC RBC-ENTMCNC: 32.3 GM/DL — SIGNIFICANT CHANGE UP (ref 32–36)
MCHC RBC-ENTMCNC: 32.4 GM/DL — SIGNIFICANT CHANGE UP (ref 32–36)
MCHC RBC-ENTMCNC: 32.4 GM/DL — SIGNIFICANT CHANGE UP (ref 32–36)
MCHC RBC-ENTMCNC: 32.5 GM/DL
MCHC RBC-ENTMCNC: 32.6 GM/DL — SIGNIFICANT CHANGE UP (ref 32–36)
MCHC RBC-ENTMCNC: 32.8 GM/DL — SIGNIFICANT CHANGE UP (ref 32–36)
MCHC RBC-ENTMCNC: 32.9 GM/DL — SIGNIFICANT CHANGE UP (ref 32–36)
MCHC RBC-ENTMCNC: 33.2 GM/DL — SIGNIFICANT CHANGE UP (ref 32–36)
MCHC RBC-ENTMCNC: 33.4 GM/DL — SIGNIFICANT CHANGE UP (ref 32–36)
MCHC RBC-ENTMCNC: 33.6 GM/DL — SIGNIFICANT CHANGE UP (ref 32–36)
MCV RBC AUTO: 88.3 FL — SIGNIFICANT CHANGE UP (ref 80–100)
MCV RBC AUTO: 91.2 FL — SIGNIFICANT CHANGE UP (ref 80–100)
MCV RBC AUTO: 91.2 FL — SIGNIFICANT CHANGE UP (ref 80–100)
MCV RBC AUTO: 92 FL
MCV RBC AUTO: 92.1 FL — SIGNIFICANT CHANGE UP (ref 80–100)
MCV RBC AUTO: 92.3 FL — SIGNIFICANT CHANGE UP (ref 80–100)
MCV RBC AUTO: 92.6 FL — SIGNIFICANT CHANGE UP (ref 80–100)
MCV RBC AUTO: 93.2 FL — SIGNIFICANT CHANGE UP (ref 80–100)
MCV RBC AUTO: 93.3 FL — SIGNIFICANT CHANGE UP (ref 80–100)
MCV RBC AUTO: 93.5 FL — SIGNIFICANT CHANGE UP (ref 80–100)
MCV RBC AUTO: 93.6 FL — SIGNIFICANT CHANGE UP (ref 80–100)
MCV RBC AUTO: 93.7 FL — SIGNIFICANT CHANGE UP (ref 80–100)
MCV RBC AUTO: 94.2 FL — SIGNIFICANT CHANGE UP (ref 80–100)
MCV RBC AUTO: 94.3 FL — SIGNIFICANT CHANGE UP (ref 80–100)
MCV RBC AUTO: 94.5 FL — SIGNIFICANT CHANGE UP (ref 80–100)
MCV RBC AUTO: 95.5 FL — SIGNIFICANT CHANGE UP (ref 80–100)
MCV RBC AUTO: 96.2 FL
MONOCYTES # BLD AUTO: 0.24 K/UL — SIGNIFICANT CHANGE UP (ref 0–0.9)
MONOCYTES # BLD AUTO: 0.29 K/UL
MONOCYTES # BLD AUTO: 0.31 K/UL
MONOCYTES # BLD AUTO: 0.31 K/UL — SIGNIFICANT CHANGE UP (ref 0–0.9)
MONOCYTES # BLD AUTO: 0.34 K/UL — SIGNIFICANT CHANGE UP (ref 0–0.9)
MONOCYTES NFR BLD AUTO: 15.7 % — HIGH (ref 2–14)
MONOCYTES NFR BLD AUTO: 7.2 % — SIGNIFICANT CHANGE UP (ref 2–14)
MONOCYTES NFR BLD AUTO: 9.1 %
MONOCYTES NFR BLD AUTO: 9.2 %
MONOCYTES NFR BLD AUTO: 9.2 % — SIGNIFICANT CHANGE UP (ref 2–14)
MONOS+MACROS # FLD: 67 % — SIGNIFICANT CHANGE UP
MONOS+MACROS # FLD: 76 % — SIGNIFICANT CHANGE UP
NEUTROPHILS # BLD AUTO: 1.23 K/UL — LOW (ref 1.8–7.4)
NEUTROPHILS # BLD AUTO: 1.76 K/UL — LOW (ref 1.8–7.4)
NEUTROPHILS # BLD AUTO: 2.35 K/UL
NEUTROPHILS # BLD AUTO: 2.58 K/UL
NEUTROPHILS # BLD AUTO: 3.28 K/UL — SIGNIFICANT CHANGE UP (ref 1.8–7.4)
NEUTROPHILS NFR BLD AUTO: 56.6 % — SIGNIFICANT CHANGE UP (ref 43–77)
NEUTROPHILS NFR BLD AUTO: 67.6 % — SIGNIFICANT CHANGE UP (ref 43–77)
NEUTROPHILS NFR BLD AUTO: 74.8 %
NEUTROPHILS NFR BLD AUTO: 76.1 %
NEUTROPHILS NFR BLD AUTO: 76.1 % — SIGNIFICANT CHANGE UP (ref 43–77)
NITRITE UR-MCNC: NEGATIVE — SIGNIFICANT CHANGE UP
NON-GYNECOLOGICAL CYTOLOGY STUDY: SIGNIFICANT CHANGE UP
NRBC # BLD: 0 /100 WBCS — SIGNIFICANT CHANGE UP (ref 0–0)
OB PNL STL: NEGATIVE — SIGNIFICANT CHANGE UP
PCO2 BLDV: 40 MMHG — SIGNIFICANT CHANGE UP (ref 39–42)
PH BLDV: 7.38 — SIGNIFICANT CHANGE UP (ref 7.32–7.43)
PH UR: 5.5 — SIGNIFICANT CHANGE UP (ref 5–8)
PHOSPHATE SERPL-MCNC: 2.4 MG/DL — LOW (ref 2.5–4.5)
PHOSPHATE SERPL-MCNC: 3.1 MG/DL — SIGNIFICANT CHANGE UP (ref 2.5–4.5)
PHOSPHATE SERPL-MCNC: 3.2 MG/DL — SIGNIFICANT CHANGE UP (ref 2.5–4.5)
PLATELET # BLD AUTO: 100 K/UL — LOW (ref 150–400)
PLATELET # BLD AUTO: 103 K/UL — LOW (ref 150–400)
PLATELET # BLD AUTO: 105 K/UL — LOW (ref 150–400)
PLATELET # BLD AUTO: 111 K/UL — LOW (ref 150–400)
PLATELET # BLD AUTO: 118 K/UL — LOW (ref 150–400)
PLATELET # BLD AUTO: 129 K/UL — LOW (ref 150–400)
PLATELET # BLD AUTO: 135 K/UL — LOW (ref 150–400)
PLATELET # BLD AUTO: 139 K/UL — LOW (ref 150–400)
PLATELET # BLD AUTO: 82 K/UL
PLATELET # BLD AUTO: 84 K/UL — LOW (ref 150–400)
PLATELET # BLD AUTO: 85 K/UL — LOW (ref 150–400)
PLATELET # BLD AUTO: 89 K/UL — LOW (ref 150–400)
PLATELET # BLD AUTO: 90 K/UL — LOW (ref 150–400)
PLATELET # BLD AUTO: 90 K/UL — LOW (ref 150–400)
PLATELET # BLD AUTO: 91 K/UL — LOW (ref 150–400)
PLATELET # BLD AUTO: 96 K/UL — LOW (ref 150–400)
PLATELET # BLD AUTO: 97 K/UL — LOW (ref 150–400)
PLATELET # BLD AUTO: 99 K/UL
PLATELET # BLD AUTO: 99 K/UL — LOW (ref 150–400)
PO2 BLDV: 27 MMHG — SIGNIFICANT CHANGE UP (ref 25–45)
POTASSIUM BLDV-SCNC: 4.2 MMOL/L — SIGNIFICANT CHANGE UP (ref 3.5–5.1)
POTASSIUM SERPL-MCNC: 3.1 MMOL/L — LOW (ref 3.5–5.3)
POTASSIUM SERPL-MCNC: 3.2 MMOL/L — LOW (ref 3.5–5.3)
POTASSIUM SERPL-MCNC: 3.6 MMOL/L — SIGNIFICANT CHANGE UP (ref 3.5–5.3)
POTASSIUM SERPL-MCNC: 3.7 MMOL/L — SIGNIFICANT CHANGE UP (ref 3.5–5.3)
POTASSIUM SERPL-MCNC: 3.9 MMOL/L — SIGNIFICANT CHANGE UP (ref 3.5–5.3)
POTASSIUM SERPL-MCNC: 3.9 MMOL/L — SIGNIFICANT CHANGE UP (ref 3.5–5.3)
POTASSIUM SERPL-MCNC: 4 MMOL/L — SIGNIFICANT CHANGE UP (ref 3.5–5.3)
POTASSIUM SERPL-MCNC: 4 MMOL/L — SIGNIFICANT CHANGE UP (ref 3.5–5.3)
POTASSIUM SERPL-MCNC: 4.2 MMOL/L — SIGNIFICANT CHANGE UP (ref 3.5–5.3)
POTASSIUM SERPL-MCNC: 4.3 MMOL/L — SIGNIFICANT CHANGE UP (ref 3.5–5.3)
POTASSIUM SERPL-SCNC: 3.1 MMOL/L — LOW (ref 3.5–5.3)
POTASSIUM SERPL-SCNC: 3.2 MMOL/L — LOW (ref 3.5–5.3)
POTASSIUM SERPL-SCNC: 3.6 MMOL/L — SIGNIFICANT CHANGE UP (ref 3.5–5.3)
POTASSIUM SERPL-SCNC: 3.7 MMOL/L — SIGNIFICANT CHANGE UP (ref 3.5–5.3)
POTASSIUM SERPL-SCNC: 3.8 MMOL/L
POTASSIUM SERPL-SCNC: 3.9 MMOL/L — SIGNIFICANT CHANGE UP (ref 3.5–5.3)
POTASSIUM SERPL-SCNC: 3.9 MMOL/L — SIGNIFICANT CHANGE UP (ref 3.5–5.3)
POTASSIUM SERPL-SCNC: 4 MMOL/L — SIGNIFICANT CHANGE UP (ref 3.5–5.3)
POTASSIUM SERPL-SCNC: 4 MMOL/L — SIGNIFICANT CHANGE UP (ref 3.5–5.3)
POTASSIUM SERPL-SCNC: 4.2 MMOL/L — SIGNIFICANT CHANGE UP (ref 3.5–5.3)
POTASSIUM SERPL-SCNC: 4.3 MMOL/L — SIGNIFICANT CHANGE UP (ref 3.5–5.3)
POTASSIUM SERPL-SCNC: 4.6 MMOL/L
PROT FLD-MCNC: 2.2 G/DL — SIGNIFICANT CHANGE UP
PROT FLD-MCNC: 2.6 G/DL — SIGNIFICANT CHANGE UP
PROT SERPL-MCNC: 6.5 G/DL — SIGNIFICANT CHANGE UP (ref 6–8.3)
PROT SERPL-MCNC: 6.9 G/DL — SIGNIFICANT CHANGE UP (ref 6–8.3)
PROT SERPL-MCNC: 7.2 G/DL — SIGNIFICANT CHANGE UP (ref 6–8.3)
PROT SERPL-MCNC: 7.5 G/DL — SIGNIFICANT CHANGE UP (ref 6–8.3)
PROT SERPL-MCNC: 7.8 G/DL — SIGNIFICANT CHANGE UP (ref 6–8.3)
PROT SERPL-MCNC: 7.9 G/DL
PROT SERPL-MCNC: 8.3 G/DL
PROT SERPL-MCNC: 9.2 G/DL — HIGH (ref 6–8.3)
PROT UR-MCNC: ABNORMAL
PROTHROM AB SERPL-ACNC: 15.8 SEC — HIGH (ref 10.6–13.6)
PROTHROM AB SERPL-ACNC: 16.4 SEC — HIGH (ref 10.6–13.6)
PROTHROM AB SERPL-ACNC: 16.4 SEC — HIGH (ref 10.6–13.6)
PROTHROM AB SERPL-ACNC: 17.1 SEC — HIGH (ref 10.6–13.6)
PROTHROM AB SERPL-ACNC: 17.3 SEC — HIGH (ref 10.6–13.6)
PROTHROM AB SERPL-ACNC: 17.8 SEC — HIGH (ref 10.6–13.6)
PROTHROM AB SERPL-ACNC: 18.3 SEC — HIGH (ref 10.6–13.6)
PT BLD: 16.8 SEC
PT BLD: 17.4 SEC
RBC # BLD: 2.24 M/UL — LOW (ref 3.8–5.2)
RBC # BLD: 2.37 M/UL — LOW (ref 3.8–5.2)
RBC # BLD: 2.46 M/UL — LOW (ref 3.8–5.2)
RBC # BLD: 2.46 M/UL — LOW (ref 3.8–5.2)
RBC # BLD: 2.52 M/UL — LOW (ref 3.8–5.2)
RBC # BLD: 2.52 M/UL — LOW (ref 3.8–5.2)
RBC # BLD: 2.61 M/UL — LOW (ref 3.8–5.2)
RBC # BLD: 2.62 M/UL — LOW (ref 3.8–5.2)
RBC # BLD: 2.64 M/UL — LOW (ref 3.8–5.2)
RBC # BLD: 2.74 M/UL — LOW (ref 3.8–5.2)
RBC # BLD: 2.76 M/UL — LOW (ref 3.8–5.2)
RBC # BLD: 2.78 M/UL — LOW (ref 3.8–5.2)
RBC # BLD: 2.83 M/UL — LOW (ref 3.8–5.2)
RBC # BLD: 2.84 M/UL — LOW (ref 3.8–5.2)
RBC # BLD: 2.89 M/UL — LOW (ref 3.8–5.2)
RBC # BLD: 3.11 M/UL
RBC # BLD: 3.13 M/UL
RBC # BLD: 3.3 M/UL — LOW (ref 3.8–5.2)
RBC # BLD: 3.59 M/UL — LOW (ref 3.8–5.2)
RBC # FLD: 15.1 % — HIGH (ref 10.3–14.5)
RBC # FLD: 15.4 % — HIGH (ref 10.3–14.5)
RBC # FLD: 15.5 % — HIGH (ref 10.3–14.5)
RBC # FLD: 15.6 % — HIGH (ref 10.3–14.5)
RBC # FLD: 15.6 % — HIGH (ref 10.3–14.5)
RBC # FLD: 15.7 % — HIGH (ref 10.3–14.5)
RBC # FLD: 15.8 % — HIGH (ref 10.3–14.5)
RBC # FLD: 15.9 % — HIGH (ref 10.3–14.5)
RBC # FLD: 16.4 % — HIGH (ref 10.3–14.5)
RBC # FLD: 16.5 % — HIGH (ref 10.3–14.5)
RBC # FLD: 17 % — HIGH (ref 10.3–14.5)
RBC # FLD: 18.1 % — HIGH (ref 10.3–14.5)
RBC # FLD: 21.6 %
RBC # FLD: 22.5 %
RBC CASTS # UR COMP ASSIST: 4 /HPF — SIGNIFICANT CHANGE UP (ref 0–4)
RCV VOL RI: HIGH /UL (ref 0–0)
RCV VOL RI: HIGH /UL (ref 0–0)
RETICS #: 116.3 K/UL — SIGNIFICANT CHANGE UP (ref 25–125)
RETICS #: 75.6 K/UL — SIGNIFICANT CHANGE UP (ref 25–125)
RETICS/RBC NFR: 2.7 % — HIGH (ref 0.5–2.5)
RETICS/RBC NFR: 4.4 % — HIGH (ref 0.5–2.5)
RH IG SCN BLD-IMP: POSITIVE — SIGNIFICANT CHANGE UP
SAO2 % BLDV: 38.9 % — LOW (ref 67–88)
SARS-COV-2 RNA SPEC QL NAA+PROBE: SIGNIFICANT CHANGE UP
SODIUM SERPL-SCNC: 132 MMOL/L
SODIUM SERPL-SCNC: 132 MMOL/L — LOW (ref 135–145)
SODIUM SERPL-SCNC: 134 MMOL/L
SODIUM SERPL-SCNC: 135 MMOL/L — SIGNIFICANT CHANGE UP (ref 135–145)
SODIUM SERPL-SCNC: 136 MMOL/L — SIGNIFICANT CHANGE UP (ref 135–145)
SODIUM SERPL-SCNC: 137 MMOL/L — SIGNIFICANT CHANGE UP (ref 135–145)
SODIUM SERPL-SCNC: 137 MMOL/L — SIGNIFICANT CHANGE UP (ref 135–145)
SODIUM SERPL-SCNC: 138 MMOL/L — SIGNIFICANT CHANGE UP (ref 135–145)
SODIUM SERPL-SCNC: 138 MMOL/L — SIGNIFICANT CHANGE UP (ref 135–145)
SODIUM SERPL-SCNC: 140 MMOL/L — SIGNIFICANT CHANGE UP (ref 135–145)
SODIUM SERPL-SCNC: 140 MMOL/L — SIGNIFICANT CHANGE UP (ref 135–145)
SODIUM SERPL-SCNC: 143 MMOL/L — SIGNIFICANT CHANGE UP (ref 135–145)
SP GR SPEC: 1.02 — SIGNIFICANT CHANGE UP (ref 1.01–1.02)
SPECIMEN SOURCE FLD: SIGNIFICANT CHANGE UP
SPECIMEN SOURCE: SIGNIFICANT CHANGE UP
TOTAL NUCLEATED CELL COUNT, BODY FLUID: 67 /UL — SIGNIFICANT CHANGE UP
TOTAL NUCLEATED CELL COUNT, BODY FLUID: 89 /UL — SIGNIFICANT CHANGE UP
TUBE TYPE: SIGNIFICANT CHANGE UP
TUBE TYPE: SIGNIFICANT CHANGE UP
URATE SERPL-MCNC: 8.6 MG/DL — HIGH (ref 2.5–7)
UROBILINOGEN FLD QL: ABNORMAL
WBC # BLD: 1.86 K/UL — LOW (ref 3.8–10.5)
WBC # BLD: 1.97 K/UL — LOW (ref 3.8–10.5)
WBC # BLD: 2.16 K/UL — LOW (ref 3.8–10.5)
WBC # BLD: 2.17 K/UL — LOW (ref 3.8–10.5)
WBC # BLD: 2.18 K/UL — LOW (ref 3.8–10.5)
WBC # BLD: 2.2 K/UL — LOW (ref 3.8–10.5)
WBC # BLD: 2.4 K/UL — LOW (ref 3.8–10.5)
WBC # BLD: 2.53 K/UL — LOW (ref 3.8–10.5)
WBC # BLD: 2.6 K/UL — LOW (ref 3.8–10.5)
WBC # BLD: 2.62 K/UL — LOW (ref 3.8–10.5)
WBC # BLD: 2.82 K/UL — LOW (ref 3.8–10.5)
WBC # BLD: 2.82 K/UL — LOW (ref 3.8–10.5)
WBC # BLD: 2.87 K/UL — LOW (ref 3.8–10.5)
WBC # BLD: 3.16 K/UL — LOW (ref 3.8–10.5)
WBC # BLD: 3.38 K/UL — LOW (ref 3.8–10.5)
WBC # BLD: 3.43 K/UL — LOW (ref 3.8–10.5)
WBC # BLD: 4.31 K/UL — SIGNIFICANT CHANGE UP (ref 3.8–10.5)
WBC # FLD AUTO: 1.86 K/UL — LOW (ref 3.8–10.5)
WBC # FLD AUTO: 1.97 K/UL — LOW (ref 3.8–10.5)
WBC # FLD AUTO: 2.16 K/UL — LOW (ref 3.8–10.5)
WBC # FLD AUTO: 2.17 K/UL — LOW (ref 3.8–10.5)
WBC # FLD AUTO: 2.18 K/UL — LOW (ref 3.8–10.5)
WBC # FLD AUTO: 2.2 K/UL — LOW (ref 3.8–10.5)
WBC # FLD AUTO: 2.4 K/UL — LOW (ref 3.8–10.5)
WBC # FLD AUTO: 2.53 K/UL — LOW (ref 3.8–10.5)
WBC # FLD AUTO: 2.6 K/UL — LOW (ref 3.8–10.5)
WBC # FLD AUTO: 2.62 K/UL — LOW (ref 3.8–10.5)
WBC # FLD AUTO: 2.82 K/UL — LOW (ref 3.8–10.5)
WBC # FLD AUTO: 2.82 K/UL — LOW (ref 3.8–10.5)
WBC # FLD AUTO: 2.87 K/UL — LOW (ref 3.8–10.5)
WBC # FLD AUTO: 3.14 K/UL
WBC # FLD AUTO: 3.16 K/UL — LOW (ref 3.8–10.5)
WBC # FLD AUTO: 3.38 K/UL — LOW (ref 3.8–10.5)
WBC # FLD AUTO: 3.39 K/UL
WBC # FLD AUTO: 3.43 K/UL — LOW (ref 3.8–10.5)
WBC # FLD AUTO: 4.31 K/UL — SIGNIFICANT CHANGE UP (ref 3.8–10.5)
WBC UR QL: 5 /HPF — SIGNIFICANT CHANGE UP (ref 0–5)

## 2022-01-01 PROCEDURE — 99232 SBSQ HOSP IP/OBS MODERATE 35: CPT | Mod: GC

## 2022-01-01 PROCEDURE — 85610 PROTHROMBIN TIME: CPT

## 2022-01-01 PROCEDURE — 49083 ABD PARACENTESIS W/IMAGING: CPT

## 2022-01-01 PROCEDURE — 36247 INS CATH ABD/L-EXT ART 3RD: CPT

## 2022-01-01 PROCEDURE — 85730 THROMBOPLASTIN TIME PARTIAL: CPT

## 2022-01-01 PROCEDURE — 80053 COMPREHEN METABOLIC PANEL: CPT

## 2022-01-01 PROCEDURE — 99497 ADVNCD CARE PLAN 30 MIN: CPT | Mod: 25

## 2022-01-01 PROCEDURE — U0005: CPT

## 2022-01-01 PROCEDURE — 76705 ECHO EXAM OF ABDOMEN: CPT

## 2022-01-01 PROCEDURE — 88305 TISSUE EXAM BY PATHOLOGIST: CPT | Mod: 26

## 2022-01-01 PROCEDURE — 76380 CAT SCAN FOLLOW-UP STUDY: CPT

## 2022-01-01 PROCEDURE — 99285 EMERGENCY DEPT VISIT HI MDM: CPT

## 2022-01-01 PROCEDURE — 78803 RP LOCLZJ TUM SPECT 1 AREA: CPT

## 2022-01-01 PROCEDURE — 75774 ARTERY X-RAY EACH VESSEL: CPT | Mod: 26

## 2022-01-01 PROCEDURE — P9047: CPT

## 2022-01-01 PROCEDURE — 99223 1ST HOSP IP/OBS HIGH 75: CPT | Mod: GC

## 2022-01-01 PROCEDURE — XXXXX: CPT | Mod: 1L

## 2022-01-01 PROCEDURE — 86923 COMPATIBILITY TEST ELECTRIC: CPT

## 2022-01-01 PROCEDURE — C1894: CPT

## 2022-01-01 PROCEDURE — 83690 ASSAY OF LIPASE: CPT

## 2022-01-01 PROCEDURE — 77336 RADIATION PHYSICS CONSULT: CPT

## 2022-01-01 PROCEDURE — 84157 ASSAY OF PROTEIN OTHER: CPT

## 2022-01-01 PROCEDURE — 87070 CULTURE OTHR SPECIMN AEROBIC: CPT

## 2022-01-01 PROCEDURE — 82435 ASSAY OF BLOOD CHLORIDE: CPT

## 2022-01-01 PROCEDURE — 49082 ABD PARACENTESIS: CPT

## 2022-01-01 PROCEDURE — 93005 ELECTROCARDIOGRAM TRACING: CPT

## 2022-01-01 PROCEDURE — 87205 SMEAR GRAM STAIN: CPT

## 2022-01-01 PROCEDURE — 88305 TISSUE EXAM BY PATHOLOGIST: CPT

## 2022-01-01 PROCEDURE — 37243 VASC EMBOLIZE/OCCLUDE ORGAN: CPT

## 2022-01-01 PROCEDURE — 99232 SBSQ HOSP IP/OBS MODERATE 35: CPT

## 2022-01-01 PROCEDURE — 88112 CYTOPATH CELL ENHANCE TECH: CPT | Mod: 26

## 2022-01-01 PROCEDURE — 78830 RP LOCLZJ TUM SPECT W/CT 1: CPT | Mod: 26

## 2022-01-01 PROCEDURE — 90662 IIV NO PRSV INCREASED AG IM: CPT

## 2022-01-01 PROCEDURE — G2012 BRIEF CHECK IN BY MD/QHP: CPT | Mod: 1L

## 2022-01-01 PROCEDURE — 99214 OFFICE O/P EST MOD 30 MIN: CPT

## 2022-01-01 PROCEDURE — 82330 ASSAY OF CALCIUM: CPT

## 2022-01-01 PROCEDURE — 84100 ASSAY OF PHOSPHORUS: CPT

## 2022-01-01 PROCEDURE — A9540: CPT

## 2022-01-01 PROCEDURE — 84295 ASSAY OF SERUM SODIUM: CPT

## 2022-01-01 PROCEDURE — U0003: CPT

## 2022-01-01 PROCEDURE — 96374 THER/PROPH/DIAG INJ IV PUSH: CPT

## 2022-01-01 PROCEDURE — 75726 ARTERY X-RAYS ABDOMEN: CPT | Mod: 26

## 2022-01-01 PROCEDURE — 86900 BLOOD TYPING SEROLOGIC ABO: CPT

## 2022-01-01 PROCEDURE — C9803: CPT

## 2022-01-01 PROCEDURE — 74176 CT ABD & PELVIS W/O CONTRAST: CPT | Mod: 26,MA

## 2022-01-01 PROCEDURE — 82042 OTHER SOURCE ALBUMIN QUAN EA: CPT

## 2022-01-01 PROCEDURE — 96375 TX/PRO/DX INJ NEW DRUG ADDON: CPT

## 2022-01-01 PROCEDURE — 89051 BODY FLUID CELL COUNT: CPT

## 2022-01-01 PROCEDURE — 86901 BLOOD TYPING SEROLOGIC RH(D): CPT

## 2022-01-01 PROCEDURE — 44360 SMALL BOWEL ENDOSCOPY: CPT | Mod: GC

## 2022-01-01 PROCEDURE — 86850 RBC ANTIBODY SCREEN: CPT

## 2022-01-01 PROCEDURE — 87521 HEPATITIS C PROBE&RVRS TRNSC: CPT

## 2022-01-01 PROCEDURE — 99231 SBSQ HOSP IP/OBS SF/LOW 25: CPT | Mod: GC

## 2022-01-01 PROCEDURE — 83615 LACTATE (LD) (LDH) ENZYME: CPT

## 2022-01-01 PROCEDURE — 85384 FIBRINOGEN ACTIVITY: CPT

## 2022-01-01 PROCEDURE — C1887: CPT

## 2022-01-01 PROCEDURE — 85014 HEMATOCRIT: CPT

## 2022-01-01 PROCEDURE — 84550 ASSAY OF BLOOD/URIC ACID: CPT

## 2022-01-01 PROCEDURE — 80048 BASIC METABOLIC PNL TOTAL CA: CPT

## 2022-01-01 PROCEDURE — 93010 ELECTROCARDIOGRAM REPORT: CPT

## 2022-01-01 PROCEDURE — 85025 COMPLETE CBC W/AUTO DIFF WBC: CPT

## 2022-01-01 PROCEDURE — 82945 GLUCOSE OTHER FLUID: CPT

## 2022-01-01 PROCEDURE — C1729: CPT

## 2022-01-01 PROCEDURE — 85027 COMPLETE CBC AUTOMATED: CPT

## 2022-01-01 PROCEDURE — 84132 ASSAY OF SERUM POTASSIUM: CPT

## 2022-01-01 PROCEDURE — 82803 BLOOD GASES ANY COMBINATION: CPT

## 2022-01-01 PROCEDURE — 87102 FUNGUS ISOLATION CULTURE: CPT

## 2022-01-01 PROCEDURE — 83735 ASSAY OF MAGNESIUM: CPT

## 2022-01-01 PROCEDURE — 99223 1ST HOSP IP/OBS HIGH 75: CPT

## 2022-01-01 PROCEDURE — 82272 OCCULT BLD FECES 1-3 TESTS: CPT

## 2022-01-01 PROCEDURE — 78830 RP LOCLZJ TUM SPECT W/CT 1: CPT

## 2022-01-01 PROCEDURE — 99213 OFFICE O/P EST LOW 20 MIN: CPT

## 2022-01-01 PROCEDURE — C1760: CPT

## 2022-01-01 PROCEDURE — 74177 CT ABD & PELVIS W/CONTRAST: CPT | Mod: 26

## 2022-01-01 PROCEDURE — 75726 ARTERY X-RAYS ABDOMEN: CPT

## 2022-01-01 PROCEDURE — 82962 GLUCOSE BLOOD TEST: CPT

## 2022-01-01 PROCEDURE — 75774 ARTERY X-RAY EACH VESSEL: CPT

## 2022-01-01 PROCEDURE — 80076 HEPATIC FUNCTION PANEL: CPT

## 2022-01-01 PROCEDURE — 74177 CT ABD & PELVIS W/CONTRAST: CPT

## 2022-01-01 PROCEDURE — C1769: CPT

## 2022-01-01 PROCEDURE — 82947 ASSAY GLUCOSE BLOOD QUANT: CPT

## 2022-01-01 PROCEDURE — 77300 RADIATION THERAPY DOSE PLAN: CPT

## 2022-01-01 PROCEDURE — P9016: CPT

## 2022-01-01 PROCEDURE — 82248 BILIRUBIN DIRECT: CPT

## 2022-01-01 PROCEDURE — 81001 URINALYSIS AUTO W/SCOPE: CPT

## 2022-01-01 PROCEDURE — 83605 ASSAY OF LACTIC ACID: CPT

## 2022-01-01 PROCEDURE — 74176 CT ABD & PELVIS W/O CONTRAST: CPT | Mod: MA

## 2022-01-01 PROCEDURE — 83010 ASSAY OF HAPTOGLOBIN QUANT: CPT

## 2022-01-01 PROCEDURE — 76705 ECHO EXAM OF ABDOMEN: CPT | Mod: 26

## 2022-01-01 PROCEDURE — 87086 URINE CULTURE/COLONY COUNT: CPT

## 2022-01-01 PROCEDURE — C2616: CPT

## 2022-01-01 PROCEDURE — 85045 AUTOMATED RETICULOCYTE COUNT: CPT

## 2022-01-01 PROCEDURE — 36430 TRANSFUSION BLD/BLD COMPNT: CPT

## 2022-01-01 PROCEDURE — 36415 COLL VENOUS BLD VENIPUNCTURE: CPT

## 2022-01-01 PROCEDURE — 88112 CYTOPATH CELL ENHANCE TECH: CPT

## 2022-01-01 PROCEDURE — 76380 CAT SCAN FOLLOW-UP STUDY: CPT | Mod: 26

## 2022-01-01 PROCEDURE — 85018 HEMOGLOBIN: CPT

## 2022-01-01 PROCEDURE — 87075 CULTR BACTERIA EXCEPT BLOOD: CPT

## 2022-01-01 PROCEDURE — 86803 HEPATITIS C AB TEST: CPT

## 2022-01-01 PROCEDURE — 82247 BILIRUBIN TOTAL: CPT

## 2022-01-01 RX ORDER — SPIRONOLACTONE 50 MG/1
50 TABLET ORAL DAILY
Qty: 30 | Refills: 2 | Status: ACTIVE | COMMUNITY
Start: 2022-01-01 | End: 1900-01-01

## 2022-01-01 RX ORDER — ALBUMIN HUMAN 25 %
50 VIAL (ML) INTRAVENOUS
Refills: 0 | Status: COMPLETED | OUTPATIENT
Start: 2022-01-01 | End: 2022-01-01

## 2022-01-01 RX ORDER — AMLODIPINE BESYLATE 2.5 MG/1
10 TABLET ORAL
Qty: 0 | Refills: 0 | DISCHARGE

## 2022-01-01 RX ORDER — PANTOPRAZOLE SODIUM 20 MG/1
1 TABLET, DELAYED RELEASE ORAL
Qty: 0 | Refills: 0 | DISCHARGE

## 2022-01-01 RX ORDER — SODIUM CHLORIDE 9 MG/ML
500 INJECTION INTRAMUSCULAR; INTRAVENOUS; SUBCUTANEOUS ONCE
Refills: 0 | Status: COMPLETED | OUTPATIENT
Start: 2022-01-01 | End: 2022-01-01

## 2022-01-01 RX ORDER — PANTOPRAZOLE 40 MG/1
40 TABLET, DELAYED RELEASE ORAL DAILY
Qty: 30 | Refills: 0 | Status: ACTIVE | COMMUNITY
Start: 2021-01-01 | End: 1900-01-01

## 2022-01-01 RX ORDER — SPIRONOLACTONE 25 MG/1
4 TABLET, FILM COATED ORAL
Qty: 120 | Refills: 0
Start: 2022-01-01 | End: 2022-01-01

## 2022-01-01 RX ORDER — POTASSIUM CHLORIDE 20 MEQ
20 PACKET (EA) ORAL
Refills: 0 | Status: COMPLETED | OUTPATIENT
Start: 2022-01-01 | End: 2022-01-01

## 2022-01-01 RX ORDER — INFLUENZA VIRUS VACCINE 15; 15; 15; 15 UG/.5ML; UG/.5ML; UG/.5ML; UG/.5ML
0.7 SUSPENSION INTRAMUSCULAR ONCE
Refills: 0 | Status: COMPLETED | OUTPATIENT
Start: 2022-01-01 | End: 2022-01-01

## 2022-01-01 RX ORDER — SODIUM CHLORIDE 9 MG/ML
1000 INJECTION INTRAMUSCULAR; INTRAVENOUS; SUBCUTANEOUS
Refills: 0 | Status: DISCONTINUED | OUTPATIENT
Start: 2022-01-01 | End: 2022-01-01

## 2022-01-01 RX ORDER — POTASSIUM CHLORIDE 20 MEQ
20 PACKET (EA) ORAL
Refills: 0 | Status: DISCONTINUED | OUTPATIENT
Start: 2022-01-01 | End: 2022-01-01

## 2022-01-01 RX ORDER — ONDANSETRON 8 MG/1
4 TABLET, FILM COATED ORAL ONCE
Refills: 0 | Status: COMPLETED | OUTPATIENT
Start: 2022-01-01 | End: 2022-01-01

## 2022-01-01 RX ORDER — POTASSIUM CHLORIDE 20 MEQ
10 PACKET (EA) ORAL
Refills: 0 | Status: DISCONTINUED | OUTPATIENT
Start: 2022-01-01 | End: 2022-01-01

## 2022-01-01 RX ORDER — FUROSEMIDE 20 MG/1
20 TABLET ORAL DAILY
Qty: 30 | Refills: 2 | Status: ACTIVE | COMMUNITY
Start: 2022-01-01 | End: 1900-01-01

## 2022-01-01 RX ORDER — POTASSIUM CHLORIDE 20 MEQ
40 PACKET (EA) ORAL ONCE
Refills: 0 | Status: DISCONTINUED | OUTPATIENT
Start: 2022-01-01 | End: 2022-01-01

## 2022-01-01 RX ORDER — LISINOPRIL 2.5 MG/1
1 TABLET ORAL
Qty: 0 | Refills: 0 | DISCHARGE

## 2022-01-01 RX ORDER — FUROSEMIDE 40 MG
20 TABLET ORAL ONCE
Refills: 0 | Status: COMPLETED | OUTPATIENT
Start: 2022-01-01 | End: 2022-01-01

## 2022-01-01 RX ORDER — AMLODIPINE BESYLATE 2.5 MG/1
1 TABLET ORAL
Qty: 0 | Refills: 0 | DISCHARGE

## 2022-01-01 RX ORDER — AMLODIPINE BESYLATE 2.5 MG/1
20 TABLET ORAL
Qty: 0 | Refills: 0 | DISCHARGE

## 2022-01-01 RX ORDER — ONDANSETRON 8 MG/1
4 TABLET, FILM COATED ORAL ONCE
Refills: 0 | Status: DISCONTINUED | OUTPATIENT
Start: 2022-01-01 | End: 2022-01-01

## 2022-01-01 RX ORDER — FUROSEMIDE 40 MG
1 TABLET ORAL
Qty: 30 | Refills: 0
Start: 2022-01-01 | End: 2022-01-01

## 2022-01-01 RX ORDER — METHYLPREDNISOLONE 4 MG/1
4 TABLET ORAL
Qty: 1 | Refills: 0 | Status: DISCONTINUED | COMMUNITY
Start: 2021-01-01 | End: 2022-01-01

## 2022-01-01 RX ORDER — SODIUM CHLORIDE 9 MG/ML
500 INJECTION INTRAMUSCULAR; INTRAVENOUS; SUBCUTANEOUS ONCE
Refills: 0 | Status: DISCONTINUED | OUTPATIENT
Start: 2022-01-01 | End: 2022-01-01

## 2022-01-01 RX ORDER — SPIRONOLACTONE 25 MG/1
100 TABLET, FILM COATED ORAL DAILY
Refills: 0 | Status: DISCONTINUED | OUTPATIENT
Start: 2022-01-01 | End: 2022-01-01

## 2022-01-01 RX ORDER — FUROSEMIDE 40 MG
20 TABLET ORAL DAILY
Refills: 0 | Status: DISCONTINUED | OUTPATIENT
Start: 2022-01-01 | End: 2022-01-01

## 2022-01-01 RX ORDER — SPIRONOLACTONE 25 MG/1
25 TABLET, FILM COATED ORAL ONCE
Refills: 0 | Status: COMPLETED | OUTPATIENT
Start: 2022-01-01 | End: 2022-01-01

## 2022-01-01 RX ORDER — BNT162B2 0.23 MG/2.25ML
0.3 INJECTION, SUSPENSION INTRAMUSCULAR ONCE
Refills: 0 | Status: COMPLETED | OUTPATIENT
Start: 2022-01-01 | End: 2022-01-01

## 2022-01-01 RX ORDER — SPIRONOLACTONE 25 MG/1
25 TABLET, FILM COATED ORAL DAILY
Refills: 0 | Status: DISCONTINUED | OUTPATIENT
Start: 2022-01-01 | End: 2022-01-01

## 2022-01-01 RX ORDER — FUROSEMIDE 40 MG
40 TABLET ORAL DAILY
Refills: 0 | Status: DISCONTINUED | OUTPATIENT
Start: 2022-01-01 | End: 2022-01-01

## 2022-01-01 RX ORDER — SODIUM CHLORIDE 9 MG/ML
500 INJECTION INTRAMUSCULAR; INTRAVENOUS; SUBCUTANEOUS
Refills: 0 | Status: DISCONTINUED | OUTPATIENT
Start: 2022-01-01 | End: 2022-01-01

## 2022-01-01 RX ORDER — OXYCODONE HYDROCHLORIDE 5 MG/1
5 TABLET ORAL EVERY 6 HOURS
Refills: 0 | Status: DISCONTINUED | OUTPATIENT
Start: 2022-01-01 | End: 2022-01-01

## 2022-01-01 RX ORDER — PANTOPRAZOLE SODIUM 20 MG/1
40 TABLET, DELAYED RELEASE ORAL ONCE
Refills: 0 | Status: COMPLETED | OUTPATIENT
Start: 2022-01-01 | End: 2022-01-01

## 2022-01-01 RX ORDER — POTASSIUM CHLORIDE 20 MEQ
10 PACKET (EA) ORAL
Refills: 0 | Status: COMPLETED | OUTPATIENT
Start: 2022-01-01 | End: 2022-01-01

## 2022-01-01 RX ORDER — MORPHINE SULFATE 50 MG/1
4 CAPSULE, EXTENDED RELEASE ORAL ONCE
Refills: 0 | Status: DISCONTINUED | OUTPATIENT
Start: 2022-01-01 | End: 2022-01-01

## 2022-01-01 RX ADMIN — Medication 40 MILLIGRAM(S): at 05:24

## 2022-01-01 RX ADMIN — SODIUM CHLORIDE 65 MILLILITER(S): 9 INJECTION INTRAMUSCULAR; INTRAVENOUS; SUBCUTANEOUS at 13:07

## 2022-01-01 RX ADMIN — Medication 20 MILLIGRAM(S): at 05:41

## 2022-01-01 RX ADMIN — Medication 20 MILLIEQUIVALENT(S): at 13:19

## 2022-01-01 RX ADMIN — Medication 40 MILLIGRAM(S): at 06:01

## 2022-01-01 RX ADMIN — Medication 20 MILLIGRAM(S): at 17:48

## 2022-01-01 RX ADMIN — Medication 20 MILLIGRAM(S): at 06:18

## 2022-01-01 RX ADMIN — SPIRONOLACTONE 25 MILLIGRAM(S): 25 TABLET, FILM COATED ORAL at 05:46

## 2022-01-01 RX ADMIN — SODIUM CHLORIDE 500 MILLILITER(S): 9 INJECTION INTRAMUSCULAR; INTRAVENOUS; SUBCUTANEOUS at 17:52

## 2022-01-01 RX ADMIN — ONDANSETRON 4 MILLIGRAM(S): 8 TABLET, FILM COATED ORAL at 17:15

## 2022-01-01 RX ADMIN — INFLUENZA VIRUS VACCINE 0.7 MILLILITER(S): 15; 15; 15; 15 SUSPENSION INTRAMUSCULAR at 15:34

## 2022-01-01 RX ADMIN — SPIRONOLACTONE 100 MILLIGRAM(S): 25 TABLET, FILM COATED ORAL at 05:16

## 2022-01-01 RX ADMIN — Medication 50 MILLILITER(S): at 12:07

## 2022-01-01 RX ADMIN — PANTOPRAZOLE SODIUM 40 MILLIGRAM(S): 20 TABLET, DELAYED RELEASE ORAL at 11:04

## 2022-01-01 RX ADMIN — Medication 100 MILLILITER(S): at 10:40

## 2022-01-01 RX ADMIN — Medication 20 MILLIGRAM(S): at 06:07

## 2022-01-01 RX ADMIN — SPIRONOLACTONE 25 MILLIGRAM(S): 25 TABLET, FILM COATED ORAL at 17:47

## 2022-01-01 RX ADMIN — MORPHINE SULFATE 4 MILLIGRAM(S): 50 CAPSULE, EXTENDED RELEASE ORAL at 21:01

## 2022-01-01 RX ADMIN — Medication 20 MILLIGRAM(S): at 05:46

## 2022-01-01 RX ADMIN — Medication 20 MILLIEQUIVALENT(S): at 17:48

## 2022-01-01 RX ADMIN — SPIRONOLACTONE 100 MILLIGRAM(S): 25 TABLET, FILM COATED ORAL at 05:24

## 2022-01-01 RX ADMIN — Medication 20 MILLIEQUIVALENT(S): at 14:37

## 2022-01-01 RX ADMIN — SPIRONOLACTONE 25 MILLIGRAM(S): 25 TABLET, FILM COATED ORAL at 06:20

## 2022-01-01 RX ADMIN — SPIRONOLACTONE 100 MILLIGRAM(S): 25 TABLET, FILM COATED ORAL at 06:01

## 2022-01-01 RX ADMIN — Medication 100 MILLILITER(S): at 10:05

## 2022-01-01 RX ADMIN — SPIRONOLACTONE 25 MILLIGRAM(S): 25 TABLET, FILM COATED ORAL at 05:41

## 2022-01-01 RX ADMIN — Medication 40 MILLIGRAM(S): at 05:16

## 2022-01-01 RX ADMIN — Medication 100 MILLILITER(S): at 10:20

## 2022-01-01 RX ADMIN — BNT162B2 0.3 MILLILITER(S): 0.23 INJECTION, SUSPENSION INTRAMUSCULAR at 14:03

## 2022-01-01 RX ADMIN — Medication 100 MILLIEQUIVALENT(S): at 12:13

## 2022-01-01 RX ADMIN — Medication 100 MILLIEQUIVALENT(S): at 11:02

## 2022-01-01 RX ADMIN — MORPHINE SULFATE 4 MILLIGRAM(S): 50 CAPSULE, EXTENDED RELEASE ORAL at 21:28

## 2022-01-01 RX ADMIN — SPIRONOLACTONE 25 MILLIGRAM(S): 25 TABLET, FILM COATED ORAL at 06:08

## 2022-01-01 NOTE — ED PROVIDER NOTE - OBJECTIVE STATEMENT
The patient is a 76y Female with pmhx of Hep C cirrhosis, HCC, HTN, complaining of RUQ abdominal pain for the last 3 weeks. Had IR embolization done for HCC over a year ago by Dr. Teto Ramirez. Pt had repeat MRI done 2 weeks ago, which showed marked "progression of disease in segment 7 posterior and superior to a partially treated lesion in the right hepatic lobe. 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 New infiltrative disease in segments 2/3 with associated tumor in vein involving branches of the left portal vein. Moderate to large volume ascites with interval increase." Pt was told to come back for evaluation for repeat IR embolization. Pt states her RUQ abd pain has gradually gotten worse, last BM was today. No fevers. Had 1 episode of NBNB vomiting earlier this week, still endorsing nausea and decreased PO intake. Pt only takes lisinopril, amlodipine, and pantoprazole. NKDA.

## 2022-01-01 NOTE — ED PROVIDER NOTE - PROGRESS NOTE DETAILS
Results reviewed with acute drop in H/H. Spoke to radiology confirms no concern for bleeding on CT. Hemeoccult negative. Spoke to Dr. Nilda Carreno, repeat CBC reviewed, will send hemolysis labs, and transfuse and admit to hospital. Patient updated on results, feels better, consented for blood transfusion. RGUJRAL

## 2022-01-01 NOTE — ED PROVIDER NOTE - CLINICAL SUMMARY MEDICAL DECISION MAKING FREE TEXT BOX
Javier Jewell MD (PGY-2): The patient is a 76y Female with pmhx of Hep C cirrhosis, HCC, HTN, complaining of RUQ abdominal pain for the last 3 weeks. Most likely worsening HCC and liver cirrhosis. IVF, pain control, zofran, labs, u/a, ctap, ekg. Pt will most likely be admitted.

## 2022-01-01 NOTE — ED PROVIDER NOTE - CARE PLAN
1 Principal Discharge DX:	Anemia   Principal Discharge DX:	Hepatocellular carcinoma  Secondary Diagnosis:	Anemia due to acute blood loss

## 2022-01-01 NOTE — ED PROVIDER NOTE - ATTENDING CONTRIBUTION TO CARE
RGUJRAL 77yo f hx Liver mass s/p resection? 2020 today presents with RUQ abdominal pain and distention. Patient had an MR Abdomen 12/20 that showed recurrent mass and abdominal ascites. Pt had 1 episode of vomiting earlier this week and now with nausea. No diarrhea, last BM today. No fever, chills. Pt states abdomen is gradually increasingly that caused her to come in today.  On exam, Patient is awake, alert and oriented x 3.  Patient is well appearing and in no acute distress.  Neck is supple, No LAD.  Lungs are CTA B/L,+S1S2 no murmurs,  Abdomen: Distended, + RUQ tender   Extremity no edema or calf tender.  Skin with no rash.  Neuro CN3-12 intact. Strength 5/5 in upper and lower extremities. Nml Sensation. Gait normal.  Patient is Tongan Speaking,  used. Check labs, CT to eval for symptoms. RGUJRAL 77yo f hx Hep C, HCC presents with RUQ abdominal pain and distention. Patient had an MR Abdomen 12/20 that showed recurrent mass and abdominal ascites. Pt had 1 episode of vomiting earlier this week and now with persistent nausea. No diarrhea, last BM today. No fever, chills. Pt states abdomen is gradually increasingly that caused her to come in today.  On exam, Patient is awake, alert and oriented x 3.  Patient is well appearing and in no acute distress.  Neck is supple, No LAD.  Lungs are CTA B/L,+S1S2 no murmurs,  Abdomen: Distended, + RUQ tender   Extremity no edema or calf tender.  Skin with no rash.  Neuro CN3-12 intact. Strength 5/5 in upper and lower extremities. Nml Sensation. Gait normal.  Patient is Panamanian Speaking,  used. Check labs, CT to eval for symptoms.

## 2022-01-01 NOTE — ED PROVIDER NOTE - PHYSICAL EXAMINATION
Gen: In moderate distress. A&Ox4. Non-toxic appearing.  HEENT: Normocephalic and atraumatic. PERRL, EOMI, no nasal discharge, mucous membranes dry, no scleral icterus.  CV: Regular rate and rhythm, +S1/S2, no M/R/G. No significant lower extremity edema. Radial and DP pulses present and symmetrical. Capillary refill less than 2 seconds.  Resp: Normal effort and rate. CTAB, no rales, rhonchi, or wheezes.  GI: Abdomen soft, moderately distended, TTP in RUQ. Hepatomegaly. Bowel sounds present.  MSK: No open wounds and no bruising. No CVAT bilaterally.  Neuro: Following commands, speaking in full sentences, moving extremities spontaneously  Psych: Appropriate mood, cooperative  Rectal: No external hemorrhoids. No melena or BRBPR.

## 2022-01-01 NOTE — ED PROVIDER NOTE - NS ED ROS FT
GENERAL: No fever or chills  EYES: No change in vision  HEENT: No trouble swallowing or speaking  CARDIAC: No chest pain  PULMONARY: No cough or SOB  GI: +abdominal pain, nausea, vomiting  : No changes in urination  SKIN: No rashes  NEURO: No headache, no numbness  MSK: No joint pain  Otherwise as HPI or negative.

## 2022-01-01 NOTE — ED PROVIDER NOTE - NSICDXPASTMEDICALHX_GEN_ALL_CORE_FT
PAST MEDICAL HISTORY:  Hepatic cirrhosis, unspecified hepatic cirrhosis type, unspecified whether ascites present     Hypertension, unspecified type     Thrombocytopenia

## 2022-01-01 NOTE — ED ADULT NURSE NOTE - OBJECTIVE STATEMENT
76 yr old female with a h/o of high bp and an embolization on r side over a year ago came in after having abd pain for 1 week. states her stomach is bloated and constipated also complaining of painful urination. denies fevers 1 episode of vomiting and dec po intake. on assessment a and o x 3 lungs clear abd slightly hard bloated no swelling in extremities no n/v/d as of now no fevers no other complaints.

## 2022-01-01 NOTE — ED ADULT NURSE REASSESSMENT NOTE - NS ED NURSE REASSESS COMMENT FT1
+ mild jaundice to bilat sclera + mild jaundice to bilat sclera. Pt endorses having PARISI x few days

## 2022-01-02 NOTE — CONSULT NOTE ADULT - ASSESSMENT
76y Female with pmhx of Hep C cirrhosis, HCC s/p SIRT 11/4/2020, HTN, complaining of RUQ abdominal pain for the last 3 weeks.    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 She reports taking medication previously, but does not recall the exact treatment. Referred to liver clinic 08/2020- w/u showed fibroscan - 19.9 kPa suggestive of cirrhosis, , MRI and US w/ 7.5 cm lesion in R hepatic lob seg 6/7 c/w HCC (LI-RADS 5). No e/o met disease on bone scans EGD 9/30/2020- Small (< 5 mm) EV, GAVE w/o bleeding, gastric polyps s/p bx, nodule in duodenum s/p bx. C-scope 9/30/2020- Internal hemorrhoids, no specimens collected. s/p IR random liver bx 10/2020 with HVPG 11 mmHg and path w/ chronic HCV and cirrhosis mild steatohepatitis. s/p IR R lobe SIRT 11/2020. F/u MR abd 02/2021 showing decreased areas of internal enhancement c/w SIRT tx. Was lost to f/u a few months after SIRT. 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 Per outpt IR note from Dr. Ramirez, Given size of lesions and that she is not a surgical candidate, recommending radioembolization as palliative treatment.    -Ascites: large volume ascites on CT A/P this admission  -Varices: EGD 9/30/2020- Small (< 5 mm) EV  -HE: none on exam    Recommendations:  - trend CBC, CMP, INR  - IR consulted for diagnostic and therapeutic paracentesis - tentative plan for 1/3  - appreciate IR and oncology recs    Selin Diaz MD  GI Fellow, PGY-4  Available via Microsoft Teams    NON-URGENT CONSULTS:  Please email [email or web address]  AT NIGHT AND ON WEEKENDS:  Contact on-call GI fellow via answering service ([phone]) from 5pm-8am and on weekends/holidays  MONDAY-FRIDAY 8AM-5PM:  Pager# [phone] ([facility]) or [phone] ([facility])  GI Phone# [phone] ([facility])   76y Female with pmhx of Hep C cirrhosis, HCC s/p SIRT 11/4/2020, HTN, complaining of RUQ abdominal pain for the last 3 weeks.    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 She reports taking medication previously, but does not recall the exact treatment. Referred to liver clinic 08/2020- w/u showed fibroscan - 19.9 kPa suggestive of cirrhosis, , MRI and US w/ 7.5 cm lesion in R hepatic lob seg 6/7 c/w HCC (LI-RADS 5). No e/o met disease on bone scans EGD 9/30/2020- Small (< 5 mm) EV, GAVE w/o bleeding, gastric polyps s/p bx, nodule in duodenum s/p bx. C-scope 9/30/2020- Internal hemorrhoids, no specimens collected. s/p IR random liver bx 10/2020 with HVPG 11 mmHg and path w/ chronic HCV and cirrhosis mild steatohepatitis. s/p IR R lobe SIRT 11/2020. F/u MR abd 02/2021 showing decreased areas of internal enhancement c/w SIRT tx. Was lost to f/u a few months after SIRT. 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 Per outpt IR note from Dr. Ramirez, Given size of lesions and that she is not a surgical candidate, recommending radioembolization as palliative treatment.    -Ascites: large volume ascites on CT A/P this admission  -Varices: EGD 9/30/2020- Small (< 5 mm) EV  -HE: none on exam    Recommendations:  -trend CBC, CMP, INR  -IR consulted for diagnostic and therapeutic paracentesis - tentative plan for 1/3  -appreciate IR and oncology recs  -tentative plan for EGD for EV screening 1/4  -please order labs (CBC, CMP, INR) for 3 AM so that results will be available early tomorrow morning; replete lytes and transfuse as needed  -NPO after midnight Monday night      Selin Diaz MD  GI Fellow, PGY-4  Available via Microsoft Teams    NON-URGENT CONSULTS:  Please email [email or web address]  AT NIGHT AND ON WEEKENDS:  Contact on-call GI fellow via answering service ([phone]) from 5pm-8am and on weekends/holidays  MONDAY-FRIDAY 8AM-5PM:  Pager# [phone] ([facility]) or [phone] ([facility])  GI Phone# [phone] ([facility])   76y Female with pmhx of Hep C cirrhosis, HCC s/p SIRT 11/4/2020, HTN, complaining of RUQ abdominal pain for the last 3 weeks.    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 She reports taking medication previously, but does not recall the exact treatment. Referred to liver clinic 08/2020- w/u showed fibroscan - 19.9 kPa suggestive of cirrhosis, , MRI and US w/ 7.5 cm lesion in R hepatic lob seg 6/7 c/w HCC (LI-RADS 5). No e/o met disease on bone scans EGD 9/30/2020- Small (< 5 mm) EV, GAVE w/o bleeding, gastric polyps s/p bx, nodule in duodenum s/p bx. C-scope 9/30/2020- Internal hemorrhoids, no specimens collected. s/p IR random liver bx 10/2020 with HVPG 11 mmHg and path w/ chronic HCV and cirrhosis mild steatohepatitis. s/p IR R lobe SIRT 11/2020. F/u MR abd 02/2021 showing decreased areas of internal enhancement c/w SIRT tx. Was lost to f/u a few months after SIRT. 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 Per outpt IR note from Dr. Ramirez, Given size of lesions and that she is not a surgical candidate, recommending radioembolization as palliative treatment.    -Ascites: large volume ascites on CT A/P this admission  -Varices: EGD 9/30/2020- Small (< 5 mm) EV  -HE: none on exam    #acute normocytic anemia- found to have Hgb drop 13 -> 7.5 in 2 months; s/p 1 u pRBC; Hgb stable at 8 (BL 13)    Recommendations:  -trend CBC, CMP, INR  -IR consulted for diagnostic and therapeutic paracentesis - tentative plan for 1/3  -appreciate IR and oncology recs  -tentative plan for EGD for EV screening 1/4  -please order labs (CBC, CMP, INR) for 3 AM so that results will be available early tomorrow morning; replete lytes and transfuse as needed  -NPO after midnight Monday night      Selin Diaz MD  GI Fellow, PGY-4  Available via [employer] Teams    NON-URGENT CONSULTS:  Please email [email or web address]  [email or web address]  AT NIGHT AND ON WEEKENDS:  Contact on-call GI fellow via answering service ([phone]) from 5pm-8am and on weekends/holidays  MONDAY-FRIDAY 8AM-5PM:  Pager# [phone] ([facility]) or [phone] ([facility])  GI Phone# [phone] ([facility])

## 2022-01-02 NOTE — CONSULT NOTE ADULT - ASSESSMENT
INCOMPLETE WILL UPDATE SHORTLY     large single HCC mass s/p SIRT 1 yr ago, liver function was previously Child osborn A, lost to follow up. I saw her in Nov, AFP 32,000! took a while to restage her. Recent scans (12/23) show POD with tumor thrombus and new large volume ascites (new decompensated cirrhosis).  1 mos ago, Tbili started to rise to 2+. Was planned for repeat SIRT but when I saw results of MRI, I called pt to come see me ASAP to discuss systemic therapy. Pt refused an apt last week and was supposed to come in this coming wed. I did not tell her about the MRI results so she is not aware.     No coming in with worsening anemia (hb 7 from 12), decompensated cirrhosis. hapto is undetectable which can be seen with cirrhosis, but I am concerned bc indirect Tbili is elevated as well. Now has TANNER. Unlikely to be TMA/hemolysis, but I would:     check smear, reticulocyte (add on from yesterday prior to transfusion), add fibrinogen  Needs therapeutic and diagnostic paracentesis.   not sure if given her current findings, she is a candidate for any DMT (will  need to re-evaluate) pending work up 77yo female with pmh of Hep C with cirrhosis, HCC s/p IR embolization with recent progression, HTN, presenting with SOB and worsened abdominal pain found to have significant anemia. Oncology consulted given history of HCC.     # HCC s/p SIRT 1 year ago with recent progression   # moderate to large ascites   - at diagnosis, Child Fisher A   - patient was lost to follow up after SIRT but returned to clinic in november where she was found to have AFP>32 000.   - Restaging MRI 12/23 showing progression of disease with partially treated lesions in the right hepatic lobe. additionally, new tumor in the vein involving branches of the right and left portal veins with increased moderate to large volume ascites   - CT chest 11/19 with no metastatic lesions   - NM bone scan with no evidence of osseous lesions   - Given her MRI findings of tumor in vein; she is no longer a surgical candidate.   - Was seen by IR as outpatient 12/23 who recommends palliative IR embolization  - IR consulted as inpatient and plan for embolization 1/3/2022     # worsened anemia   # Hep C associated Cirrhosis   # decompensated with increased ascites   # hyperbilirubinemia   - Labs Nov 12 showing normal hemoglobin with thrombocytopenia; mild elevation in T bili (2.0), Cr 0.6   - Labs at admission  *** Anemia (hbg of 7.5; repeat labs 12 hours later 6.8) now s/p 1 unit pRBC;   *** Platelet count >100; now with slight downtrend   *** T bili significantly increased to 3.3; with both direct and indirect elevations - consistent with hepatic pathology   *** TANNER with elevated Cr   ***elevated LDH, Hapto <20   - Peripheral smear reviewed: No schitiocytes, no spherocytes, + aura cells, + target cells; no signs of hemolysis. --> consistent with hepatic dysfunction  - Given history of Cirrhosis with HCC and ascites, haptoglobin, LDH, thombocytopenia and T-bili results can be explained. TANNER can also be secondary to hepatorenal syndrome in setting of large ascites.   - Would recommend retic count (unable to add to yesterdays cbc), fibrinogen, coags   - Would recommend diagnostic and therapeutic paracentesis     Meryl Montoya MD   PGY4 heme onc fellow   covering only 1/2/2022 75yo female with pmh of Hep C with cirrhosis, HCC s/p IR embolization with recent progression, HTN, presenting with SOB and worsened abdominal pain found to have significant anemia. Oncology consulted given history of HCC.     # HCC s/p SIRT 1 year ago with recent progression   # moderate to large ascites   - at diagnosis, Child Fsiher A   - patient was lost to follow up after SIRT but returned to clinic in november where she was found to have AFP>32 000.   - Restaging MRI 12/23 showing progression of disease with partially treated lesions in the right hepatic lobe. additionally, new tumor in the vein involving branches of the right and left portal veins with increased moderate to large volume ascites   - CT chest 11/19 with no metastatic lesions   - NM bone scan with no evidence of osseous lesions   - Given her MRI findings of tumor in vein; she is no longer a surgical candidate.   - Was seen by IR as outpatient 12/23 who recommends palliative IR embolization  - IR consulted as inpatient and plan for embolization 1/3/2022     # worsened anemia   # Hep C associated Cirrhosis   # decompensated with increased ascites   # hyperbilirubinemia   - Labs Nov 12 showing normal hemoglobin with thrombocytopenia; mild elevation in T bili (2.0), Cr 0.6   - Labs at admission  *** Anemia (hbg of 7.5; repeat labs 12 hours later 6.8) now s/p 1 unit pRBC;   *** Platelet count >100; now with slight downtrend   *** T bili significantly increased to 3.3; with both direct and indirect elevations - consistent with hepatic pathology   *** TANNER with elevated Cr   ***elevated LDH, Hapto <20   - Peripheral smear reviewed: No schitiocytes, no spherocytes, + aura cells, + target cells; no signs of hemolysis. --> consistent with hepatic dysfunction  - Given history of Cirrhosis with HCC and ascites, haptoglobin, LDH, thombocytopenia and T-bili results can be explained. TANNER can also be secondary to hepatorenal syndrome in setting of large ascites. Would not favor TMA or hemolysis process at this time.   - Would recommend retic count (unable to add to yesterdays cbc), fibrinogen, coags   - Would recommend diagnostic and therapeutic paracentesis     Meryl Montoya MD   PGY4 heme onc fellow   covering only 1/2/2022

## 2022-01-02 NOTE — H&P ADULT - NSHPPOASURGSITEINCISION_GEN_ALL_CORE
Injectable Influenza Immunization Documentation    1.  Is the person to be vaccinated sick today?   No    2. Does the person to be vaccinated have an allergy to a component   of the vaccine?   No    3. Has the person to be vaccinated ever had a serious reaction   to influenza vaccine in the past?   No    4. Has the person to be vaccinated ever had Guillain-Barré syndrome?   No    Form completed by Niru Campos LPN            no

## 2022-01-02 NOTE — CONSULT NOTE ADULT - ATTENDING COMMENTS
76y Female w/ Hep C Cirrhosis and HCC s/p Y90 11/4/2020 now p/w RUQ pain and progression of disease at recent MR.    Plan:   - Case d/w Dr. Ramirez. Patient reviewed at recent tumor board.  - Heme/Onc consult.  - Palliative care consult.  - Plan for paracentesis Monday.  - Place IR procedure order

## 2022-01-02 NOTE — H&P ADULT - ASSESSMENT
76y Female with pmhx of Hep C cirrhosis, HCC s/p IR embolization, HTN, complaining of RUQ abdominal pain for the last 3 weeks.       Anemia in the setting of Cirrhosis and HCC- Transfuse prn.   Occult blood.   Anemic profile.     Ascites Malignancy related GI/ Hetology consult   IR consult   Keep patient on clears      HCC Hepatology consult   IR consult- IR embolization

## 2022-01-02 NOTE — CONSULT NOTE ADULT - ATTENDING COMMENTS
75 y/o F w/ hx of HCV cirrhosis, HCC s/p SIRT 11/4/2020 w/ progression of disease and now tumor in vein, HTN, CKD p/w RUQ abdominal pain.    Suspect pain is related to HCC progression of disease.  S/p LVP today.  ? IR TACE procedure from primarily palliative reasons. Could also consider SBRT outpatient.  EGD tmrw for varices screen given drop in h/h.

## 2022-01-02 NOTE — PATIENT PROFILE ADULT - FALL HARM RISK - HARM RISK INTERVENTIONS
Assistance with ambulation/Assistance OOB with selected safe patient handling equipment/Communicate Risk of Fall with Harm to all staff/Monitor gait and stability/Reinforce activity limits and safety measures with patient and family/Sit up slowly, dangle for a short time, stand at bedside before walking/Tailored Fall Risk Interventions/Visual Cue: Yellow wristband and red socks/Bed in lowest position, wheels locked, appropriate side rails in place/Call bell, personal items and telephone in reach/Instruct patient to call for assistance before getting out of bed or chair/Non-slip footwear when patient is out of bed/[place] to call system/Physically safe environment - no spills, clutter or unnecessary equipment/Purposeful Proactive Rounding/Room/bathroom lighting operational, light cord in reach

## 2022-01-02 NOTE — H&P ADULT - HISTORY OF PRESENT ILLNESS
76y Female with pmhx of Hep C cirrhosis, HCC s/p IR embolization, HTN, complaining of RUQ abdominal pain for the last 3 weeks.     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     Patient denies fevers/ reports nausea..

## 2022-01-02 NOTE — CONSULT NOTE ADULT - ATTENDING COMMENTS
75yo female with pmh of Hep C with cirrhosis, HCC s/p IR embolization with recent progression, HTN,   presenting with SOB and worsened abdominal pain  patient was lost to follow up after SIRT but returned to clinic in november where she was found to have AFP>32 000.   Restaging MRI 12/23 showing progression of disease with partially treated lesions in the right hepatic lobe. additionally, new tumor in the vein involving branches of the right and left portal veins with increased moderate to large volume ascites   CT chest 11/19 with no metastatic lesions   NM bone scan with no evidence of osseous lesions   Given her MRI findings of tumor in vein; she is no longer a surgical candidate.   Was seen by IR plan for palliative IR embolization and paracentesis  worsened anemia, rule out hemolytic anemia  Peripheral smear reviewed: No schitiocytes, no spherocytes, + aura cells, + target cells; no signs of hemolysis. --> consistent with hepatic dysfunction  Given history of Cirrhosis with HCC and ascites, haptoglobin, LDH, thombocytopenia and T-bili results can be explained.   TANNER likely secondary to hepatorenal syndrome in setting of large ascites. Would not favor TMA or hemolysis process at this time.

## 2022-01-02 NOTE — CONSULT NOTE ADULT - SUBJECTIVE AND OBJECTIVE BOX
Chief Complaint:  Patient is a 76y old  Female who presents with a chief complaint of     HPI: 76y Female with pmhx of Hep C cirrhosis, HCC s/p SIRT 2020, HTN, complaining of RUQ abdominal pain for the last 3 weeks. She also reported having shortness of breath for the last 3 days that appeared suddenly. She denied and chest pain, coughing, fever, nausea or vomiting. 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 Per OP IR note from Dr. Ramirez, Given size of lesions and that she is not a surgical candidate, recommending radioembolization as palliative treatment.       Allergies:  No Known Allergies      Home Medications:    Hospital Medications:  influenza  Vaccine (HIGH DOSE) 0.7 milliLiter(s) IntraMuscular once      PMHX/PSHX:  Hypertension, unspecified type    Thrombocytopenia    Hepatic cirrhosis, unspecified hepatic cirrhosis type, unspecified whether ascites present    Vaginal cyst        Family history:   Denies any family history of GI-related disease or cancers.    Social History:   ETOH: denies  Tobacco: denies  Illicit drug use: denies    ROS: 14 point ROS negative unless otherwise stated in HPI      Vital Signs:  Vital Signs Last 24 Hrs  T(C): 36.8 (2022 16:07), Max: 36.8 (2022 02:31)  T(F): 98.3 (2022 16:07), Max: 98.3 (2022 16:07)  HR: 65 (2022 16:07) (65 - 96)  BP: 122/75 (2022 16:07) (105/66 - 166/63)  BP(mean): --  RR: 18 (2022 16:07) (16 - 18)  SpO2: 97% (2022 16:07) (94% - 97%)  Daily Height in cm: 165.1 (2022 00:08)    Daily     PHYSICAL EXAM:     GENERAL:  Appears stated age, well-groomed, well-nourished, no distress  HEENT:  NC/AT,  conjunctivae clear and pink  CHEST:  Full & symmetric excursion, no increased effort, breath sounds clear  HEART:  Regular rhythm, S1, S2, no murmur/rub/S3/S4  ABDOMEN:  Soft, non-tender, non-distended, normoactive bowel sounds,    EXTREMITIES:  no cyanosis,clubbing or edema  SKIN:  No rash/erythema/ecchymoses/petechiae/wounds/abscess/warm/dry  NEURO:  Alert, orientedx3, no asterixis      LABS:                        7.9    3.16  )-----------( 99       ( 2022 03:18 )             24.6         135  |  103  |  31<H>  ----------------------------<  91  4.0   |  17<L>  |  1.90<H>    Ca    8.1<L>      2022 17:15  Phos  3.2       Mg     2.3         TPro  x   /  Alb  x   /  TBili  x   /  DBili  1.8<H>  /  AST  x   /  ALT  x   /  AlkPhos  x       LIVER FUNCTIONS - ( 2022 17:15 )  Alb: 3.1 g/dL / Pro: 7.8 g/dL / ALK PHOS: 123 U/L / ALT: 193 U/L / AST: 753 U/L / GGT: x           PT/INR - ( 2022 17:15 )   PT: 15.8 sec;   INR: 1.33 ratio         PTT - ( 2022 17:15 )  PTT:33.4 sec  Urinalysis Basic - ( 2022 16:37 )    Color: Yellow / Appearance: Clear / S.017 / pH: x  Gluc: x / Ketone: Negative  / Bili: Negative / Urobili: 2 mg/dL   Blood: x / Protein: Trace / Nitrite: Negative   Leuk Esterase: Negative / RBC: 4 /hpf / WBC 5 /HPF   Sq Epi: x / Non Sq Epi: 1 /hpf / Bacteria: Negative          Imaging:       ACC: [id number] EXAM:  CT ABDOMEN AND PELVIS                          PROCEDURE DATE:  2022          INTERPRETATION:  CLINICAL INFORMATION: Abdominal pain and distention.   Progressive hepatocellular carcinoma status post treatment. Right partial    COMPARISON: MR abdomen 2021    CONTRAST/COMPLICATIONS:  IV Contrast: NONE  Oral Contrast: NONE  Complications: None reported at time of study completion    PROCEDURE:  CT of the Abdomen and Pelvis was performed.  Sagittal and coronal reformats were performed.    FINDINGS:  LOWER CHEST: Small right pleural effusion with associated atelectasis..    LIVER: Multiple heterogeneous appearing lesions are again seen. For   reference, there is a peripherally calcified lesion in the right hepatic   lobe measuring approximately 6.8 x 7.1 cm. These lesions are better   appreciated on prior MR dated 2021. Cirrhotic morphology.  BILE DUCTS: Normal caliber.  GALLBLADDER: Within normal limits.  SPLEEN: Splenomegaly.  PANCREAS: Within normal limits.  ADRENALS: Within normal limits.  KIDNEYS/URETERS: Left renal cyst. No hydronephrosis. No renal stones.    BLADDER: Within normal limits.  REPRODUCTIVE ORGANS: Calcified fibroids.    BOWEL: No bowel obstruction. Appendix is not visualized. No evidence of   inflammation in the pericecal region.  PERITONEUM: Large volume ascites.  VESSELS: Atherosclerotic changes.  RETROPERITONEUM/LYMPH NODES: No lymphadenopathy.  ABDOMINAL WALL: Within normal limits.  BONES: Grade 1 anterolisthesis of L4 onL5. Retrolisthesis of L5 on S1.    IMPRESSION:  Multiple heterogeneous appearing hepatic lesions, better appreciated on   prior MR dated 2021, grossly unchanged    Large volume ascites.           Chief Complaint:  Patient is a 76y old  Female who presents with a chief complaint of     HPI: 76y Female with pmhx of Hep C cirrhosis, HCC s/p SIRT 2020, HTN, complaining of RUQ abdominal pain for the last 3 weeks. She also reported having shortness of breath for the last 3 days that appeared suddenly. She denied and chest pain, coughing, fever, nausea or vomiting. 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 Per OP IR note from Dr. Ramirez, Given size of lesions and that she is not a surgical candidate, recommending radioembolization as palliative treatment.       Allergies:  No Known Allergies      Home Medications:    Hospital Medications:  influenza  Vaccine (HIGH DOSE) 0.7 milliLiter(s) IntraMuscular once      PMHX/PSHX:  Hypertension, unspecified type    Thrombocytopenia    Hepatic cirrhosis, unspecified hepatic cirrhosis type, unspecified whether ascites present    Vaginal cyst        Family history:   Denies any family history of GI-related disease or cancers.    Social History:   ETOH: denies  Tobacco: denies  Illicit drug use: denies    ROS: 14 point ROS negative unless otherwise stated in HPI      Vital Signs:  Vital Signs Last 24 Hrs  T(C): 36.8 (2022 16:07), Max: 36.8 (2022 02:31)  T(F): 98.3 (2022 16:07), Max: 98.3 (2022 16:07)  HR: 65 (2022 16:07) (65 - 96)  BP: 122/75 (2022 16:07) (105/66 - 166/63)  BP(mean): --  RR: 18 (2022 16:07) (16 - 18)  SpO2: 97% (2022 16:07) (94% - 97%)  Daily Height in cm: 165.1 (2022 00:08)    Daily     PHYSICAL EXAM:     GENERAL:  Appears stated age, well-groomed, well-nourished, no distress  HEENT:  NC/AT,  conjunctivae clear and pink  CHEST:  Full & symmetric excursion, no increased effort, breath sounds clear  HEART:  Regular rhythm, S1, S2, no murmur/rub/S3/S4  ABDOMEN:  Soft, non-tender, +moderately distended, normoactive bowel sounds,    EXTREMITIES:  no cyanosis, clubbing or edema  SKIN:  No rash/erythema/ecchymoses/petechiae/wounds/abscess/warm/dry  NEURO:  Alert, orientedx3, no asterixis      LABS:                        7.9    3.16  )-----------( 99       ( 2022 03:18 )             24.6         135  |  103  |  31<H>  ----------------------------<  91  4.0   |  17<L>  |  1.90<H>    Ca    8.1<L>      2022 17:15  Phos  3.2       Mg     2.3         TPro  x   /  Alb  x   /  TBili  x   /  DBili  1.8<H>  /  AST  x   /  ALT  x   /  AlkPhos  x       LIVER FUNCTIONS - ( 2022 17:15 )  Alb: 3.1 g/dL / Pro: 7.8 g/dL / ALK PHOS: 123 U/L / ALT: 193 U/L / AST: 753 U/L / GGT: x           PT/INR - ( 2022 17:15 )   PT: 15.8 sec;   INR: 1.33 ratio         PTT - ( 2022 17:15 )  PTT:33.4 sec  Urinalysis Basic - ( 2022 16:37 )    Color: Yellow / Appearance: Clear / S.017 / pH: x  Gluc: x / Ketone: Negative  / Bili: Negative / Urobili: 2 mg/dL   Blood: x / Protein: Trace / Nitrite: Negative   Leuk Esterase: Negative / RBC: 4 /hpf / WBC 5 /HPF   Sq Epi: x / Non Sq Epi: 1 /hpf / Bacteria: Negative          Imaging:       ACC: [id number] EXAM:  CT ABDOMEN AND PELVIS                          PROCEDURE DATE:  2022          INTERPRETATION:  CLINICAL INFORMATION: Abdominal pain and distention.   Progressive hepatocellular carcinoma status post treatment. Right partial    COMPARISON: MR abdomen 2021    CONTRAST/COMPLICATIONS:  IV Contrast: NONE  Oral Contrast: NONE  Complications: None reported at time of study completion    PROCEDURE:  CT of the Abdomen and Pelvis was performed.  Sagittal and coronal reformats were performed.    FINDINGS:  LOWER CHEST: Small right pleural effusion with associated atelectasis..    LIVER: Multiple heterogeneous appearing lesions are again seen. For   reference, there is a peripherally calcified lesion in the right hepatic   lobe measuring approximately 6.8 x 7.1 cm. These lesions are better   appreciated on prior MR dated 2021. Cirrhotic morphology.  BILE DUCTS: Normal caliber.  GALLBLADDER: Within normal limits.  SPLEEN: Splenomegaly.  PANCREAS: Within normal limits.  ADRENALS: Within normal limits.  KIDNEYS/URETERS: Left renal cyst. No hydronephrosis. No renal stones.    BLADDER: Within normal limits.  REPRODUCTIVE ORGANS: Calcified fibroids.    BOWEL: No bowel obstruction. Appendix is not visualized. No evidence of   inflammation in the pericecal region.  PERITONEUM: Large volume ascites.  VESSELS: Atherosclerotic changes.  RETROPERITONEUM/LYMPH NODES: No lymphadenopathy.  ABDOMINAL WALL: Within normal limits.  BONES: Grade 1 anterolisthesis of L4 onL5. Retrolisthesis of L5 on S1.    IMPRESSION:  Multiple heterogeneous appearing hepatic lesions, better appreciated on   prior MR dated 2021, grossly unchanged    Large volume ascites.

## 2022-01-02 NOTE — CONSULT NOTE ADULT - SUBJECTIVE AND OBJECTIVE BOX
Oncology Consult Note    HPI:  76y Female with pmhx of Hep C cirrhosis, HCC s/p IR embolization, HTN, complaining of RUQ abdominal pain for the last 3 weeks.     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     Patient denies fevers/ reports nausea..  (02 Jan 2022 00:08)      Allergies    No Known Allergies    Intolerances        MEDICATIONS  (STANDING):  influenza  Vaccine (HIGH DOSE) 0.7 milliLiter(s) IntraMuscular once    MEDICATIONS  (PRN):      PAST MEDICAL & SURGICAL HISTORY:  Hypertension, unspecified type    Thrombocytopenia    Hepatic cirrhosis, unspecified hepatic cirrhosis type, unspecified whether ascites present    Vaginal cyst        FAMILY HISTORY:      SOCIAL HISTORY: No EtOH, no tobacco    REVIEW OF SYSTEMS:    CONSTITUTIONAL: No weakness, fevers or chills  EYES/ENT: No visual changes;  No vertigo or throat pain   NECK: No pain or stiffness  RESPIRATORY: No cough, wheezing, hemoptysis; No shortness of breath  CARDIOVASCULAR: No chest pain or palpitations  GASTROINTESTINAL: No abdominal or epigastric pain. No nausea, vomiting, or hematemesis; No diarrhea or constipation. No melena or hematochezia.  GENITOURINARY: No dysuria, frequency or hematuria  NEUROLOGICAL: No numbness or weakness  SKIN: No itching, burning, rashes, or lesions   All other review of systems is negative unless indicated above.    Height (cm): 165.1 (01-02 @ 00:08)  Weight (kg): 63.5 (01-02 @ 00:08)  BMI (kg/m2): 23.3 (01-02 @ 00:08)  BSA (m2): 1.7 (01-02 @ 00:08)    T(F): 97.7 (01-02-22 @ 04:19), Max: 98.2 (01-02-22 @ 02:31)  HR: 75 (01-02-22 @ 09:40)  BP: 166/63 (01-02-22 @ 09:40)  RR: 18 (01-02-22 @ 09:40)  SpO2: 96% (01-02-22 @ 09:40)  Wt(kg): --    GENERAL: NAD, well-developed  HEAD:  Atraumatic, Normocephalic  EYES: EOMI, PERRLA, conjunctiva and sclera clear  NECK: Supple, No JVD  CHEST/LUNG: Clear to auscultation bilaterally; No wheeze  HEART: Regular rate and rhythm; No murmurs, rubs, or gallops  ABDOMEN: Soft, Nontender, Nondistended; Bowel sounds present  EXTREMITIES:  2+ Peripheral Pulses, No clubbing, cyanosis, or edema  NEUROLOGY: non-focal  SKIN: No rashes or lesions                          7.9    3.16  )-----------( 99       ( 02 Jan 2022 03:18 )             24.6       01-01    135  |  103  |  31<H>  ----------------------------<  91  4.0   |  17<L>  |  1.90<H>    Ca    8.1<L>      01 Jan 2022 17:15  Phos  3.2     01-01  Mg     2.3     01-01    TPro  x   /  Alb  x   /  TBili  x   /  DBili  1.8<H>  /  AST  x   /  ALT  x   /  AlkPhos  x   01-01      Haptoglobin, Serum: <20 mg/dL (01-01 @ 23:40)  Lactate Dehydrogenase, Serum: 316 U/L (01-01 @ 22:22)  Uric Acid, Serum: 8.6 mg/dL (01-01 @ 22:22)  Magnesium, Serum: 2.3 mg/dL (01-01 @ 17:15)  Phosphorus Level, Serum: 3.2 mg/dL (01-01 @ 17:15)       Oncology Consult Note    HPI:  76y Female with pmhx of Hep C cirrhosis, HCC s/p IR embolization 1 year prior, HTN, complaining of RUQ abdominal pain for the last 3 weeks. She was suppose to follow up with DR lemus in office, however reported that the pain was just too bad to stay at home. She reported abdominal pain, especially in the right upper quadrant thatwas constantly there. She also reported having shortness of breath for the last 3 days that appeared suddenly. She denied and chest pain, coughing, fever, nausea or vomiting      At time of encounter, patient seen in the ED. Reports feeling better then at admission with just the transfusion and morphine. She states that she was able to walk around the room which is different then the last few days. She reports wanting to follow up with Dr lemus as outpatient but was unable to obtain a ride from [facility] to [facility] with a vaccinated  especially during the holiday times.   She denies any bleeding or bruising, reports stools are brown/dark brown.         Allergies    No Known Allergies    Intolerances        MEDICATIONS  (STANDING):  influenza  Vaccine (HIGH DOSE) 0.7 milliLiter(s) IntraMuscular once    MEDICATIONS  (PRN):      PAST MEDICAL & SURGICAL HISTORY:  Hypertension, unspecified type    Thrombocytopenia    Hepatic cirrhosis, unspecified hepatic cirrhosis type, unspecified whether ascites present    Vaginal cyst        FAMILY HISTORY:      SOCIAL HISTORY: No EtOH, no tobacco    REVIEW OF SYSTEMS:    CONSTITUTIONAL: No weakness, fevers or chills  EYES/ENT: No visual changes;  No vertigo or throat pain   NECK: No pain or stiffness  RESPIRATORY: + SOB   CARDIOVASCULAR: No chest pain or palpitations  GASTROINTESTINAL: + abdominal Pain, no hematemesis; No diarrhea or constipation. No melena or hematochezia.  GENITOURINARY: No dysuria, frequency or hematuria  NEUROLOGICAL: No numbness or weakness  SKIN: No itching, burning, rashes, or lesions   All other review of systems is negative unless indicated above.    Height (cm): 165.1 (01-02 @ 00:08)  Weight (kg): 63.5 (01-02 @ 00:08)  BMI (kg/m2): 23.3 (01-02 @ 00:08)  BSA (m2): 1.7 (01-02 @ 00:08)    T(F): 97.7 (01-02-22 @ 04:19), Max: 98.2 (01-02-22 @ 02:31)  HR: 75 (01-02-22 @ 09:40)  BP: 166/63 (01-02-22 @ 09:40)  RR: 18 (01-02-22 @ 09:40)  SpO2: 96% (01-02-22 @ 09:40)  Wt(kg): --    GENERAL: NAD, well-developed  HEAD:  Atraumatic, Normocephalic  EYES: EOMI, PERRLA, conjunctiva and sclera clear  NECK: Supple,  CHEST/LUNG: on room air   HEART: sinus rhythm  ABDOMEN: distended, palpable liver, no overt mass palpable  NEUROLOGY: non-focal  SKIN: No rashes or lesions                          7.9    3.16  )-----------( 99       ( 02 Jan 2022 03:18 )             24.6       01-01    135  |  103  |  31<H>  ----------------------------<  91  4.0   |  17<L>  |  1.90<H>    Ca    8.1<L>      01 Jan 2022 17:15  Phos  3.2     01-01  Mg     2.3     01-01    TPro  x   /  Alb  x   /  TBili  x   /  DBili  1.8<H>  /  AST  x   /  ALT  x   /  AlkPhos  x   01-01      Haptoglobin, Serum: <20 mg/dL (01-01 @ 23:40)  Lactate Dehydrogenase, Serum: 316 U/L (01-01 @ 22:22)  Uric Acid, Serum: 8.6 mg/dL (01-01 @ 22:22)  Magnesium, Serum: 2.3 mg/dL (01-01 @ 17:15)  Phosphorus Level, Serum: 3.2 mg/dL (01-01 @ 17:15)

## 2022-01-02 NOTE — ED ADULT NURSE REASSESSMENT NOTE - GENERAL PATIENT STATE
comfortable appearance/cooperative/resting/sleeping
comfortable appearance/cooperative
comfortable appearance/cooperative

## 2022-01-02 NOTE — CONSULT NOTE ADULT - SUBJECTIVE AND OBJECTIVE BOX
Vascular & Interventional Radiology    HPI: 76y Female with pmhx of Hep C cirrhosis, HCC s/p IR embolization, HTN, complaining of RUQ abdominal pain for the last 3 weeks. 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     Allergies:   Medications (Abx/Cardiac/Anticoagulation/Blood Products)      Data:  165.1  63.5  T(C): 36.5  HR: 75  BP: 166/63  RR: 18  SpO2: 96%    -WBC 3.16 / HgB 7.9 / Hct 24.6 / Plt 99  -Na 135 / Cl 103 / BUN 31 / Glucose 91  -K 4.0 / CO2 17 / Cr 1.90  - / Alk Phos 123 / T.Bili 3.3  -INR1.33    Imaging: reviewed and as in HPI.    Assessment:   76y Female w/ Hep C Cirrhosis and HCC s/p Y90 11/4/2020 now p/w RUQ pain and progression of disease at recent MR.    Plan:  Vascular & Interventional Radiology    HPI: 76y Female with pmhx of Hep C cirrhosis, HCC s/p IR embolization, HTN, complaining of RUQ abdominal pain for the last 3 weeks. 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     Allergies:   Medications (Abx/Cardiac/Anticoagulation/Blood Products)      Data:  165.1  63.5  T(C): 36.5  HR: 75  BP: 166/63  RR: 18  SpO2: 96%    -WBC 3.16 / HgB 7.9 / Hct 24.6 / Plt 99  -Na 135 / Cl 103 / BUN 31 / Glucose 91  -K 4.0 / CO2 17 / Cr 1.90  - / Alk Phos 123 / T.Bili 3.3  -INR1.33    Imaging: reviewed and as in HPI.    Assessment:   76y Female w/ Hep C Cirrhosis and HCC s/p Y90 11/4/2020 now p/w RUQ pain and progression of disease at recent MR.    Plan:   - Case d/w Dr. Ramirez. Patient reviewed at recent tumor board.  - Heme/Onc consult.  - Palliative care consult.  - Plan for paracentesis Monday.  - Place IR procedure order under Dr. Skelton.

## 2022-01-03 NOTE — CONSULT NOTE ADULT - SUBJECTIVE AND OBJECTIVE BOX
HPI: Ms. Nathan is a 76 year-old woman with history of multiple medical issues including hypertension, hepatitis C, cirrhosis, and hepatocellular carcinoma s/p IR embolization. [facility] presented 22 to the [facility] ER with RUQ pain for 3 weeks, and in setting of known tumor progression based on recent imaging. As surgical repair does not appear to be an option, she is now recommended for palliative radioembolization. She is slated for embolization today. She is also planned for EGD tomorrow to screen for varices.      Ms. Nathan was noted on admission to have TANNER, with a creatinine of 1.9mg/dL. Yesterday her creatinine trended down to 1.5mg/dL, but as of today, the creatinine remained at 1.5mg/dL. In light of the TANNER, a renal consultation was requested.    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 Per OP IR note from Dr. Ramirez, Given size of lesions and that she is not a surgical candidate, recommending radioembolization as palliative treatment.   -NPO after midnight Monday night  PAST MEDICAL & SURGICAL HISTORY:  Hypertension, unspecified type  Hepatitis C   Hepatic cirrhosis  Hepatocellular CA  Vaginal cyst    Allergies  No Known Allergies    SOCIAL HISTORY:  Denies ETOh,Smoking,     FAMILY HISTORY:  No CKD    REVIEW OF SYSTEMS:  CONSTITUTIONAL: No weakness, fevers or chills  EYES/ENT: No visual changes;  No vertigo or throat pain   NECK: No pain or stiffness  RESPIRATORY: No cough, wheezing, hemoptysis; No shortness of breath  CARDIOVASCULAR: No chest pain or palpitations  GASTROINTESTINAL: (+)RUQ pain  GENITOURINARY: No dysuria, frequency or hematuria  NEUROLOGICAL: No numbness or weakness  SKIN: No itching, burning, rashes, or lesions   All other review of systems is negative unless indicated above.    VITAL:  T(C): , Max: 37.3 (22 @ 00:21)  T(F): , Max: 99.1 (22 @ 00:21)  HR: 101 (22 @ 09:47)  BP: 106/62 (22 @ 09:47)  RR: 18 (22 @ 09:47)  SpO2: 100% (22 @ 09:47)    PHYSICAL EXAM:  Constitutional: NAD, Alert  HEENT: NCAT, MMM  Neck: Supple, No JVD  Respiratory: CTA-b/l  Cardiovascular: RRR s1s2, no m/r/g  Gastrointestinal: BS+, soft, NT/ND  Extremities: No peripheral edema b/l  Neurological: no focal deficits; strength grossly intact  Back: no CVAT b/l  Skin: No rashes, no nevi    LABS:                        8.0    2.53  )-----------( 97       ( 2022 07:06 )             23.8     Na(135)/K(4.0)/Cl(104)/HCO3(19)/BUN(30)/Cr(1.50)Glu(69)/Ca(8.0)/Mg(--)/PO4(--)     @ 07:06  Na(135)/K(3.9)/Cl(102)/HCO3(21)/BUN(32)/Cr(1.57)Glu(103)/Ca(8.1)/Mg(--)/PO4(--)     @ 18:54  Na(135)/K(4.0)/Cl(103)/HCO3(17)/BUN(31)/Cr(1.90)Glu(91)/Ca(8.1)/Mg(2.3)/PO4(3.2)     @ 17:15    Urinalysis Basic - ( 2022 16:37 )  Color: Yellow / Appearance: Clear / S.017 / pH: x  Gluc: x / Ketone: Negative  / Bili: Negative / Urobili: 2 mg/dL   Blood: x / Protein: Trace / Nitrite: Negative   Leuk Esterase: Negative / RBC: 4 /hpf / WBC 5 /HPF   Sq Epi: x / Non Sq Epi: 1 /hpf / Bacteria: Negative    (20) - BUN/creatinine: 8/0.84      IMAGING:  < from: CT Abdomen and Pelvis No Cont (22 @ 19:11) >  KIDNEYS/URETERS: Left renal cyst. No hydronephrosis. No renal stones.  Multiple heterogeneous appearing hepatic lesions, better appreciated on prior MR dated 2021, grossly unchanged  Large volume ascites.      ASSESSMENT:      RECOMMEND:      Thank you for involving [facility] in this patient's care.    With warm regards,    Ganesh Guidry MD   [facility]  Office: [phone]  Cell: [phone]             HPI: Ms. Nathan is a 76 year-old woman with history of multiple medical issues including hypertension, hepatitis C, cirrhosis, and hepatocellular carcinoma s/p IR embolization. [facility] presented 22 to the [facility] ER with RUQ pain for 3 weeks, and in setting of known tumor progression based on recent imaging. As surgical repair does not appear to be an option, she is now recommended for palliative radioembolization. She is slated for embolization today. She is also planned for EGD tomorrow to screen for varices.    Ms. Nathan was noted on admission to have TANNER, with a creatinine of 1.9mg/dL. Yesterday her creatinine trended down to 1.5mg/dL, but as of today, the creatinine remained at 1.5mg/dL. In light of the TANNER, a renal consultation was requested.    I see that she is s/p paracentesis today, with 3.6L removal.      Given size of lesions and that she is not a surgical candidate, recommending radioembolization as palliative treatment.         PAST MEDICAL & SURGICAL HISTORY:  Hypertension, unspecified type  Hepatitis C   Hepatic cirrhosis  Hepatocellular CA  Vaginal cyst    Allergies  No Known Allergies    SOCIAL HISTORY:  Denies ETOh,Smoking,     FAMILY HISTORY:  No CKD    REVIEW OF SYSTEMS:  CONSTITUTIONAL: No weakness, fevers or chills  EYES/ENT: No visual changes;  No vertigo or throat pain   NECK: No pain or stiffness  RESPIRATORY: No cough, wheezing, hemoptysis; No shortness of breath  CARDIOVASCULAR: No chest pain or palpitations  GASTROINTESTINAL: (+)RUQ pain  GENITOURINARY: No dysuria, frequency or hematuria  NEUROLOGICAL: No numbness or weakness  SKIN: No itching, burning, rashes, or lesions   All other review of systems is negative unless indicated above.    VITAL:  T(C): , Max: 37.3 (22 @ 00:21)  T(F): , Max: 99.1 (22 @ 00:21)  HR: 101 (22 @ 09:47)  BP: 106/62 (22 @ 09:47)  RR: 18 (22 @ 09:47)  SpO2: 100% (22 @ 09:47)    PHYSICAL EXAM:  Constitutional: NAD, Alert  HEENT: NCAT, MMM  Neck: Supple, No JVD  Respiratory: CTA-b/l  Cardiovascular: RRR s1s2, no m/r/g  Gastrointestinal: BS+, soft, NT/ND  Extremities: No peripheral edema b/l  Neurological: no focal deficits; strength grossly intact  Back: no CVAT b/l  Skin: No rashes, no nevi    LABS:                        8.0    2.53  )-----------( 97       ( 2022 07:06 )             23.8     Na(135)/K(4.0)/Cl(104)/HCO3(19)/BUN(30)/Cr(1.50)Glu(69)/Ca(8.0)/Mg(--)/PO4(--)     @ 07:06  Na(135)/K(3.9)/Cl(102)/HCO3(21)/BUN(32)/Cr(1.57)Glu(103)/Ca(8.1)/Mg(--)/PO4(--)     @ 18:54  Na(135)/K(4.0)/Cl(103)/HCO3(17)/BUN(31)/Cr(1.90)Glu(91)/Ca(8.1)/Mg(2.3)/PO4(3.2)     @ 17:15    Urinalysis Basic - ( 2022 16:37 )  Color: Yellow / Appearance: Clear / S.017 / pH: x  Gluc: x / Ketone: Negative  / Bili: Negative / Urobili: 2 mg/dL   Blood: x / Protein: Trace / Nitrite: Negative   Leuk Esterase: Negative / RBC: 4 /hpf / WBC 5 /HPF   Sq Epi: x / Non Sq Epi: 1 /hpf / Bacteria: Negative    (21) - BUN/creatinine: 8/0.64  (20) - BUN/creatinine: 8/0.84      IMAGING:  < from: CT Abdomen and Pelvis No Cont (22 @ 19:11) >  KIDNEYS/URETERS: Left renal cyst. No hydronephrosis. No renal stones.  Multiple heterogeneous appearing hepatic lesions, better appreciated on prior MR dated 2021, grossly unchanged  Large volume ascites.      ASSESSMENT:  (1)TANNER - unclear exact etiology -does not appear to be hepatorenal syndrome. Contrast nephropathy from prior chemoembolization?  (2)Hepatology - cirrhosis   (3)Hepatocellular CA - poor overall prognosis - awaiting palliative chemoembolization    RECOMMEND:      Thank you for involving [facility] in this patient's care.    With warm regards,    Ganesh Guidry MD   [facility]  Office: [phone]  Cell: [phone]             HPI: Ms. Nathan is a 76 year-old woman with history of multiple medical issues including hypertension, hepatitis C, cirrhosis, and hepatocellular carcinoma s/p IR embolization. [facility] presented 22 to the [facility] ER with RUQ pain for 3 weeks, and in setting of known tumor progression based on recent imaging. As surgical repair does not appear to be an option, she is now recommended for palliative radioembolization. She is slated for embolization today. She is also planned for EGD tomorrow to screen for varices.    Ms. Nathan was noted on admission to have TANNER, with a creatinine of 1.9mg/dL. Yesterday her creatinine trended down to 1.5mg/dL, but as of today, the creatinine remained at 1.5mg/dL. In light of the TANNER, a renal consultation was requested.    I see that she is s/p paracentesis today, with 3.6L removal.      Given size of lesions and that she is not a surgical candidate, recommending radioembolization as palliative treatment.         PAST MEDICAL & SURGICAL HISTORY:  Hypertension, unspecified type  Hepatitis C   Hepatic cirrhosis  Hepatocellular CA  Vaginal cyst    Allergies  No Known Allergies    SOCIAL HISTORY:  Denies ETOh,Smoking,     FAMILY HISTORY:  No CKD    REVIEW OF SYSTEMS:  CONSTITUTIONAL: No weakness, fevers or chills  EYES/ENT: No visual changes;  No vertigo or throat pain   NECK: No pain or stiffness  RESPIRATORY: No cough, wheezing, hemoptysis; No shortness of breath  CARDIOVASCULAR: No chest pain or palpitations  GASTROINTESTINAL: (+)RUQ pain  GENITOURINARY: No dysuria, frequency or hematuria  NEUROLOGICAL: No numbness or weakness  SKIN: No itching, burning, rashes, or lesions   All other review of systems is negative unless indicated above.    VITAL:  T(C): , Max: 37.3 (22 @ 00:21)  T(F): , Max: 99.1 (22 @ 00:21)  HR: 101 (22 @ 09:47)  BP: 106/62 (22 @ 09:47)  RR: 18 (22 @ 09:47)  SpO2: 100% (22 @ 09:47)    PHYSICAL EXAM:  Constitutional: NAD, Alert  HEENT: NCAT, MMM  Neck: Supple, No JVD  Respiratory: CTA-b/l  Cardiovascular: RRR s1s2, no m/r/g  Gastrointestinal: BS+, soft, NT/ND  Extremities: No peripheral edema b/l  Neurological: no focal deficits; strength grossly intact  Back: no CVAT b/l  Skin: No rashes, no nevi    LABS:                        8.0    2.53  )-----------( 97       ( 2022 07:06 )             23.8     Na(135)/K(4.0)/Cl(104)/HCO3(19)/BUN(30)/Cr(1.50)Glu(69)/Ca(8.0)/Mg(--)/PO4(--)     @ 07:06  Na(135)/K(3.9)/Cl(102)/HCO3(21)/BUN(32)/Cr(1.57)Glu(103)/Ca(8.1)/Mg(--)/PO4(--)     @ 18:54  Na(135)/K(4.0)/Cl(103)/HCO3(17)/BUN(31)/Cr(1.90)Glu(91)/Ca(8.1)/Mg(2.3)/PO4(3.2)     @ 17:15    Urinalysis Basic - ( 2022 16:37 )  Color: Yellow / Appearance: Clear / S.017 / pH: x  Gluc: x / Ketone: Negative  / Bili: Negative / Urobili: 2 mg/dL   Blood: x / Protein: Trace / Nitrite: Negative   Leuk Esterase: Negative / RBC: 4 /hpf / WBC 5 /HPF   Sq Epi: x / Non Sq Epi: 1 /hpf / Bacteria: Negative    (21) - BUN/creatinine: 8/0.64  (20) - BUN/creatinine: 8/0.84      IMAGING:  < from: CT Abdomen and Pelvis No Cont (22 @ 19:11) >  KIDNEYS/URETERS: Left renal cyst. No hydronephrosis. No renal stones.  Multiple heterogeneous appearing hepatic lesions, better appreciated on prior MR dated 2021, grossly unchanged  Large volume ascites.      ASSESSMENT:  (1)TANNER - unclear exact etiology -does not appear to be hepatorenal syndrome. Contrast nephropathy from prior chemoembolization?  (2)Hepatology - cirrhosis   (3)Hepatocellular CA - poor overall prognosis - awaiting palliative chemoembolization    RECOMMEND:  (1)BMP daily  (2)If planning for repeat chemoembolization, please discuss with me further so that we can fully weigh risks and benefits, and so that we can optimizing timing/prophyaxis against contrast nephropathy    Thank you for involving [facility] in this patient's care.    With warm regards,    Ganesh Guidry MD   [facility]  Office: [phone]  Cell: [phone]             HPI: Ms. Nathan is a 76 year-old woman with history of multiple medical issues including hypertension, hepatitis C, cirrhosis, and hepatocellular carcinoma s/p IR embolization. [facility] presented 22 to the [facility] ER with RUQ pain for 3 weeks, and in setting of known tumor progression based on recent imaging. As surgical repair does not appear to be an option, she is now recommended for palliative radioembolization. She is slated for embolization today. She is also planned for EGD tomorrow to screen for varices.    Ms. Nathan was noted on admission to have TANNER, with a creatinine of 1.9mg/dL. Yesterday her creatinine trended down to 1.5mg/dL, but as of today, the creatinine remained at 1.5mg/dL. In light of the TANNER, a renal consultation was requested.    I see that she is s/p paracentesis today, with 3.6L removal. Her abdominal pain is much improved, s/p paracentesis. Ms. Nathan denies known prior history of CKD or TANNER. She denies notable urinary changes of late. She denies recent NSAID use.     Given size of lesions and that she is not a surgical candidate, recommending radioembolization as palliative treatment.         PAST MEDICAL & SURGICAL HISTORY:  Hypertension, unspecified type  Hepatitis C   Hepatic cirrhosis  Hepatocellular CA  Vaginal cyst    Allergies  No Known Allergies    SOCIAL HISTORY:  Denies ETOh,Smoking,     FAMILY HISTORY:  No CKD    REVIEW OF SYSTEMS:  CONSTITUTIONAL: No weakness, fevers or chills  EYES/ENT: No visual changes;  No vertigo or throat pain   NECK: No pain or stiffness  RESPIRATORY: No cough, wheezing, hemoptysis; No shortness of breath  CARDIOVASCULAR: No chest pain or palpitations  GASTROINTESTINAL: (+)RUQ pain  GENITOURINARY: No dysuria, frequency or hematuria  NEUROLOGICAL: No numbness or weakness  SKIN: No itching, burning, rashes, or lesions   All other review of systems is negative unless indicated above.    VITAL:  T(C): , Max: 37.3 (22 @ 00:21)  T(F): , Max: 99.1 (22 @ 00:21)  HR: 101 (22 @ 09:47)  BP: 106/62 (22 @ 09:47)  RR: 18 (22 @ 09:47)  SpO2: 100% (22 @ 09:47)    PHYSICAL EXAM:  Constitutional: NAD, Alert  HEENT: NCAT, MMM  Neck: Supple, No JVD  Respiratory: CTA-b/l  Cardiovascular: RRR s1s2, no m/r/g  Gastrointestinal: BS+, soft, NT, (+)mild distension  Extremities: No peripheral edema b/l  Neurological: no focal deficits; strength grossly intact  Back: no CVAT b/l  Skin: No rashes, no nevi    LABS:                        8.0    2.53  )-----------( 97       ( 2022 07:06 )             23.8     Na(135)/K(4.0)/Cl(104)/HCO3(19)/BUN(30)/Cr(1.50)Glu(69)/Ca(8.0)/Mg(--)/PO4(--)     @ 07:06  Na(135)/K(3.9)/Cl(102)/HCO3(21)/BUN(32)/Cr(1.57)Glu(103)/Ca(8.1)/Mg(--)/PO4(--)     @ 18:54  Na(135)/K(4.0)/Cl(103)/HCO3(17)/BUN(31)/Cr(1.90)Glu(91)/Ca(8.1)/Mg(2.3)/PO4(3.2)     @ 17:15    Urinalysis Basic - ( 2022 16:37 )  Color: Yellow / Appearance: Clear / S.017 / pH: x  Gluc: x / Ketone: Negative  / Bili: Negative / Urobili: 2 mg/dL   Blood: x / Protein: Trace / Nitrite: Negative   Leuk Esterase: Negative / RBC: 4 /hpf / WBC 5 /HPF   Sq Epi: x / Non Sq Epi: 1 /hpf / Bacteria: Negative    (21) - BUN/creatinine: 8/0.64  (20) - BUN/creatinine: 8/0.84      IMAGING:  < from: CT Abdomen and Pelvis No Cont (22 @ 19:11) >  KIDNEYS/URETERS: Left renal cyst. No hydronephrosis. No renal stones.  Multiple heterogeneous appearing hepatic lesions, better appreciated on prior MR dated 2021, grossly unchanged  Large volume ascites.      ASSESSMENT:  (1)TANNER - unclear exact etiology -does not appear to be hepatorenal syndrome. Contrast nephropathy from prior chemoembolization?  (2)Hepatology - cirrhosis   (3)Hepatocellular CA - poor overall prognosis - awaiting palliative chemoembolization    RECOMMEND:  (1)BMP daily  (2)If planning for repeat chemoembolization, please discuss with me further so that we can fully weigh risks and benefits, and so that we can optimizing timing/prophyaxis against contrast nephropathy    Thank you for involving [facility] in this patient's care.    With warm regards,    Ganesh Guidry MD   [facility]  Office: [phone]  Cell: [phone]

## 2022-01-03 NOTE — PROGRESS NOTE ADULT - SUBJECTIVE AND OBJECTIVE BOX
Patient is a 76y old  Female who presents with a chief complaint of     SUBJECTIVE / OVERNIGHT EVENTS: no events over night     MEDICATIONS  (STANDING):  influenza  Vaccine (HIGH DOSE) 0.7 milliLiter(s) IntraMuscular once  sodium chloride 0.9%. 500 milliLiter(s) (30 mL/Hr) IV Continuous <Continuous>    MEDICATIONS  (PRN):  ondansetron Injectable 4 milliGRAM(s) IV Push once PRN Nausea and/or Vomiting      CAPILLARY BLOOD GLUCOSE      POCT Blood Glucose.: 87 mg/dL (2022 06:26)    I&O's Summary                                            7.7    2.17  )-----------( 84       ( 2022 07:25 )             23.2           LIVER FUNCTIONS - ( 2022 07:34 )  Alb: 3.0 g/dL / Pro: 6.9 g/dL / ALK PHOS: 87 U/L / ALT: 100 U/L / AST: 337 U/L / GGT: x           PT/INR - ( 2022 07:06 )   PT: 16.4 sec;   INR: 1.39 ratio           136|106|25<78  4.2|20|1.05  7.8,--,--   @ 07:34  --|--|--<--  --|--|--  --,2.3,2.4  04 @ 07:23          PT/INR - ( 2022 07:06 )   PT: 16.4 sec;   INR: 1.39 ratio           136|106|25<78  4.2|20|1.05  7.8,--,--   @ 07:34  --|--|--<--  --|--|--  --,2.3,2.4   @ 07:23    PHYSICAL EXAM:  GENERAL: NAD, well-developed  HEAD:  Atraumatic, Normocephalic  EYES: EOMI, PERRLA, conjunctiva and sclera clear  NECK: Supple, No JVD  CHEST/LUNG: Clear to auscultation bilaterally; No wheeze  HEART: Regular rate and rhythm; No murmurs, rubs, or gallops  ABDOMEN: Soft, Nontender, Nondistended; Bowel sounds present  EXTREMITIES:  2+ Peripheral Pulses, No clubbing, cyanosis, or edema  PSYCH: AAOx3  NEUROLOGY: non-focal  SKIN: No rashes or lesions                          7.7    2.17  )-----------( 84       ( 2022 07:25 )             23.2           LIVER FUNCTIONS - ( 2022 07:34 )  Alb: 3.0 g/dL / Pro: 6.9 g/dL / ALK PHOS: 87 U/L / ALT: 100 U/L / AST: 337 U/L / GGT: x           PT/INR - ( 2022 07:06 )   PT: 16.4 sec;   INR: 1.39 ratio           136|106|25<78  4.2|20|1.05  7.8,--,--   @ 07:34  --|--|--<--  --|--|--  --,2.3,2.4   @ 07:23        Urinalysis Basic - ( 2022 16:37 )    Color: Yellow / Appearance: Clear / S.017 / pH: x  Gluc: x / Ketone: Negative  / Bili: Negative / Urobili: 2 mg/dL   Blood: x / Protein: Trace / Nitrite: Negative   Leuk Esterase: Negative / RBC: 4 /hpf / WBC 5 /HPF   Sq Epi: x / Non Sq Epi: 1 /hpf / Bacteria: Negative        RADIOLOGY & ADDITIONAL TESTS:    Imaging Personally Reviewed:    Consultant(s) Notes Reviewed:      Care Discussed with Consultants/Other Providers:

## 2022-01-03 NOTE — PROGRESS NOTE ADULT - ASSESSMENT
76y Female with pmhx of Hep C cirrhosis, HCC s/p IR embolization, HTN, complaining of RUQ abdominal pain for the last 3 weeks.     Ascites Malignancy related GI/ Hetology consult   IR consult appreciated s/p paracentesis   Advance diet     HCC Hepatology consult appreciated   EGD as per GI     Anemia in the setting of Cirrhosis and HCC- Transfuse prn.   Anemic profile.

## 2022-01-04 NOTE — PROGRESS NOTE ADULT - SUBJECTIVE AND OBJECTIVE BOX
Patient is a 76y old  Female who presents with a chief complaint of     SUBJECTIVE / OVERNIGHT EVENTS: no events over night     MEDICATIONS  (STANDING):  influenza  Vaccine (HIGH DOSE) 0.7 milliLiter(s) IntraMuscular once  sodium chloride 0.9%. 500 milliLiter(s) (30 mL/Hr) IV Continuous <Continuous>    MEDICATIONS  (PRN):  ondansetron Injectable 4 milliGRAM(s) IV Push once PRN Nausea and/or Vomiting      CAPILLARY BLOOD GLUCOSE      POCT Blood Glucose.: 87 mg/dL (2022 06:26)    I&O's Summary                          7.7    2.17  )-----------( 84       ( 2022 07:25 )             23.2           LIVER FUNCTIONS - ( 2022 07:34 )  Alb: 3.0 g/dL / Pro: 6.9 g/dL / ALK PHOS: 87 U/L / ALT: 100 U/L / AST: 337 U/L / GGT: x           PT/INR - ( 2022 07:06 )   PT: 16.4 sec;   INR: 1.39 ratio           136|106|25<78  4.2|20|1.05  7.8,--,--  - @ 07:34  --|--|--<--  --|--|--  --,2.3,2.4   @ 07:23    PHYSICAL EXAM:  GENERAL: NAD, well-developed  HEAD:  Atraumatic, Normocephalic  EYES: EOMI, PERRLA, conjunctiva and sclera clear  NECK: Supple, No JVD  CHEST/LUNG: Clear to auscultation bilaterally; No wheeze  HEART: Regular rate and rhythm; No murmurs, rubs, or gallops  ABDOMEN: Soft, Nontender, Nondistended; Bowel sounds present  EXTREMITIES:  2+ Peripheral Pulses, No clubbing, cyanosis, or edema  PSYCH: AAOx3  NEUROLOGY: non-focal  SKIN: No rashes or lesions                          7.7    2.17  )-----------( 84       ( 2022 07:25 )             23.2           LIVER FUNCTIONS - ( 2022 07:34 )  Alb: 3.0 g/dL / Pro: 6.9 g/dL / ALK PHOS: 87 U/L / ALT: 100 U/L / AST: 337 U/L / GGT: x           PT/INR - ( 2022 07:06 )   PT: 16.4 sec;   INR: 1.39 ratio           136|106|25<78  4.2|20|1.05  7.8,--,--   @ 07:34  --|--|--<--  --|--|--  --,2.3,2.4   @ 07:23        Urinalysis Basic - ( 2022 16:37 )    Color: Yellow / Appearance: Clear / S.017 / pH: x  Gluc: x / Ketone: Negative  / Bili: Negative / Urobili: 2 mg/dL   Blood: x / Protein: Trace / Nitrite: Negative   Leuk Esterase: Negative / RBC: 4 /hpf / WBC 5 /HPF   Sq Epi: x / Non Sq Epi: 1 /hpf / Bacteria: Negative        RADIOLOGY & ADDITIONAL TESTS:    Imaging Personally Reviewed:    Consultant(s) Notes Reviewed:      Care Discussed with Consultants/Other Providers:

## 2022-01-04 NOTE — CONSULT NOTE ADULT - PROBLEM SELECTOR RECOMMENDATION 9
currently denies abdominal pain, s/p paracentesis  ISTOP reviewed, at home takes PRN celebrex per patient report  if pain, would rec oxycodone 5mg q6h prn  if acute/severe pain, would rec dilaudid 0.2-0.5mg IV q4-6h prn given previous TANNER  bowel regimen

## 2022-01-04 NOTE — CONSULT NOTE ADULT - PROBLEM SELECTOR RECOMMENDATION 3
currently full code, ongoing GOC post procedure and after heme/onc input on options.  HCP form completed today, see GOC note completed separately on this day  >16 ACP

## 2022-01-04 NOTE — CONSULT NOTE ADULT - ASSESSMENT
76y Female with pmhx of Hep C cirrhosis, HCC s/p IR embolization, HTN, complaining of RUQ abdominal pain for the last 3 weeks.   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 Palliative consulted for GOC and symptom management

## 2022-01-04 NOTE — CONSULT NOTE ADULT - PROBLEM SELECTOR RECOMMENDATION 2
progression of disease  plan for IR embolization  case discussed with Dr. Jayla ceja depends on clinical stability and labs

## 2022-01-04 NOTE — GOALS OF CARE CONVERSATION - ADVANCED CARE PLANNING - CONVERSATION DETAILS
APARNA Consulted to assist in GOC discussion in the setting of patient with advanced illness. LMSW and Dr. Kahn of Palliative met with patient at bedside today.    Team first introduced selves and roles in patient care. Patient verbalized understanding and was receptive to consult today. Team next inquired into patient's symptoms, and patient denies any symptoms at present, but discussed her significant discomfort in the community leading her to this hospitalization. Team next inquired into patient's understanding of current medical status and hospitalization, and patient demonstrates some understanding of condition and status, but notes hoping to learn more about her status and options available to her moving forward soon. Team to discuss case with oncology and, pending their discussions with patient and treatment options available, will follow up with patient on 1/6 for further GOC and ACP discussion. Patient verbalized agreement with follow up plan for 1/6.    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 Patient states she came from a big family, as patient is one of eight children, but states she is the youngest child and was always helping her siblings, without substantial support herself. Patient states that her niece and nephew do assist her, however, with transportation to and from appointments and with whatever else patient might need. Team inquired into current advance care planning, and if patient has completed HCP form in the past. Patient states she has not, but verbalized agreement with HCP completion today. Patient identifies nephew Saul Chambers, [phone], as primary proxy, and niece Aditi Tawana, [phone] consulted to assist in GOC discussion in the setting of patient with advanced illness. LMSW and Dr. Kahn of Palliative met with patient at bedside today.    Team first introduced selves and roles in patient care. Patient verbalized understanding and was receptive to consult today. Team next inquired into patient's symptoms, and patient denies any symptoms at present, but discussed her significant discomfort in the community leading her to this hospitalization. Team next inquired into patient's understanding of current medical status and hospitalization, and patient demonstrates some understanding of condition and status, but notes hoping to learn more about her status and options available to her moving forward in the coming days. Team to discuss case with oncology and, pending their discussions with patient and treatment options available, will follow up with patient on 1/6 for further GOC and ACP discussion. Patient verbalized agreement with follow up plan for 1/6.    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 Patient states she comes from a big family, as patient is one of eight children, but states she is the youngest child and was always helping her siblings, without substantial support herself. Patient states that her niece and nephew do assist her, however, with transportation to and from appointments and with whatever else patient might need. Team inquired into current advance care planning, and if patient has completed HCP form in the past. Patient states she has not, but verbalized agreement with HCP completion today. Patient identifies nephew Saul Chambers, [phone], as primary proxy, and niece Aditi Gilliam, [phone], as secondary proxy. HCP completed today and placed into chart, with copy provided to patient for her records.     Patient appears to be coping well with current hospitalization and medical condition. Team assured patient of ongoing support and availability.    GAP to revisit patient later this week, and will continue to follow this case.

## 2022-01-04 NOTE — CONSULT NOTE ADULT - SUBJECTIVE AND OBJECTIVE BOX
HPI:  76y Female with pmhx of Hep C cirrhosis, HCC s/p IR embolization, HTN, complaining of RUQ abdominal pain for the last 3 weeks.     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     Patient denies fevers/ reports nausea..  (2022 00:08)    PERTINENT PM/SXH:   Hypertension, unspecified type    Thrombocytopenia    Hepatic cirrhosis, unspecified hepatic cirrhosis type, unspecified whether ascites present      Vaginal cyst      FAMILY HISTORY:    ITEMS NOT CHECKED ARE NOT PRESENT    SOCIAL HISTORY:   Significant other/partner[ ]  Children[ ]  Faith/Spirituality:  Substance hx:  [ ]   Tobacco hx:  [ ]   Alcohol hx: [ ]   Home Opioid hx:  [x ] I-Stop Reference No:[id number]  Living Situation: [ ]Home  [ ]Long term care  [ ]Rehab [ ]Other    ADVANCE DIRECTIVES:    DNR  MOLST  [ ]  Living Will  [ ]   DECISION MAKER(s):  [ ] Health Care Proxy(s)  [ ] Surrogate(s)  [ ] Guardian           Name(s): Phone Number(s):    BASELINE (I)ADL(s) (prior to admission):  [place]: [ ]Total  [ ] Moderate [ ]Dependent    Allergies    No Known Allergies    Intolerances    MEDICATIONS  (STANDING):  influenza  Vaccine (HIGH DOSE) 0.7 milliLiter(s) IntraMuscular once  sodium chloride 0.9%. 500 milliLiter(s) (30 mL/Hr) IV Continuous <Continuous>    MEDICATIONS  (PRN):  ondansetron Injectable 4 milliGRAM(s) IV Push once PRN Nausea and/or Vomiting    PRESENT SYMPTOMS: [ ]  Due to Covid 19 infection data obtained from nursing assessment.  Source if other than patient:  [ ]Family   [ ]Team     Pain: [ ]yes [ ]no  QOL impact -   Location -                    Aggravating factors -  Quality -  Radiation -  Timing-  Severity (0-10 scale):  Minimal acceptable level (0-10 scale):     CPOT:    https://www.[facility].org/getattachment/[email or web address]-6z5r-7l6n-6073z8422o1f/Critical-Care-Pain-Observation-Tool-(CPOT)      PAIN AD Score:     http://geriatrictoolkit.[facility]/cog/painad.pdf (press ctrl +  left click to view)    Dyspnea:                           [ ]Mild [ ]Moderate [ ]Severe  Anxiety:                             [ ]Mild [ ]Moderate [ ]Severe  Fatigue:                             [ ]Mild [ ]Moderate [ ]Severe  Nausea:                             [ ]Mild [ ]Moderate [ ]Severe  Loss of appetite:              [ ]Mild [ ]Moderate [ ]Severe  Constipation:                    [ ]Mild [ ]Moderate [ ]Severe    Other Symptoms:  [ ]All other review of systems negative     Palliative Performance Status Version 2:         %    http://[facility].org/files/news/palliative_performance_scale_ppsv2.pdf  PHYSICAL EXAM: DUE TO COVID-19 INFECTION  physical exam findings from the clinician caring for this patient directly are used.   Vital Signs Last 24 Hrs  T(C): 36.5 (2022 14:42), Max: 37.2 (2022 21:18)  T(F): 97.7 (2022 14:42), Max: 99 (2022 21:18)  HR: 92 (2022 14:42) (82 - 99)  BP: 120/77 (2022 14:42) (103/61 - 127/82)  BP(mean): --  RR: 14 (2022 14:42) (14 - 18)  SpO2: 99% (2022 14:42) (96% - 100%) I&O's Summary    GENERAL:  [ ]Alert  [ ]Oriented x   [ ]Lethargic  [ ]Cachexia  [ ]Unarousable  [ ]Verbal  [ ]Non-Verbal  Behavioral:   [ ] Anxiety  [ ] Delirium [ ] Agitation [ ] Other  HEENT:  [ ]Normal   [ ]Dry mouth   [ ]ET Tube/Trach  [ ]Oral lesions  PULMONARY:   [ ]Clear [ ]Tachypnea  [ ]Audible excessive secretions   [ ]Rhonchi        [ ]Right [ ]Left [ ]Bilateral  [ ]Crackles        [ ]Right [ ]Left [ ]Bilateral  [ ]Wheezing     [ ]Right [ ]Left [ ]Bilateral  [ ]Diminished breath sounds [ ]right [ ]left [ ]bilateral  CARDIOVASCULAR:    [ ]Regular [ ]Irregular [ ]Tachy  [ ]Marin [ ]Murmur [ ]Other  GASTROINTESTINAL:  [ ]Soft  [ ]Distended   [ ]+BS  [ ]Non tender [ ]Tender  [ ]PEG [ ]OGT/ NGT  Last BM:   GENITOURINARY:  [ ]Normal [ ] Incontinent   [ ]Oliguria/Anuria   [ ]Calle  MUSCULOSKELETAL:   [ ]Normal   [ ]Weakness  [ ]Bed/Wheelchair bound [ ]Edema  NEUROLOGIC:   [ ]No focal deficits  [ ]Cognitive impairment  [ ]Dysphagia [ ]Dysarthria [ ]Paresis [ ]Other   SKIN:   [ ]Normal    [ ]Rash  [ ]Pressure ulcer(s)       Present on admission [ ]y [ ]n    CRITICAL CARE:  [ ] Shock Present  [ ]Septic [ ]Cardiogenic [ ]Neurologic [ ]Hypovolemic  [ ]  Vasopressors [ ]  Inotropes   [ ]Respiratory failure present [ ]Mechanical ventilation [ ]Non-invasive ventilatory support [ ]High flow     [ ]Acute  [ ]Chronic [ ]Hypoxic  [ ]Hypercarbic [ ]Other  [ ]Other organ failure     LABS:                        7.7    2.17  )-----------( 84       ( 2022 07:25 )             23.2   01-04    136  |  106  |  25<H>  ----------------------------<  78  4.2   |  20<L>  |  1.05    Ca    7.8<L>      2022 07:34  Phos  2.4     01-04  Mg     2.3     -04    TPro  6.9  /  Alb  3.0<L>  /  TBili  3.0<H>  /  DBili  x   /  AST  337<H>  /  ALT  100<H>  /  AlkPhos  87  01-04  PT/INR - ( 2022 07:06 )   PT: 16.4 sec;   INR: 1.39 ratio             Urinalysis Basic - ( 2022 16:37 )    Color: Yellow / Appearance: Clear / S.017 / pH: x  Gluc: x / Ketone: Negative  / Bili: Negative / Urobili: 2 mg/dL   Blood: x / Protein: Trace / Nitrite: Negative   Leuk Esterase: Negative / RBC: 4 /hpf / WBC 5 /HPF   Sq Epi: x / Non Sq Epi: 1 /hpf / Bacteria: Negative              RADIOLOGY & ADDITIONAL STUDIES:    PROTEIN CALORIE MALNUTRITION PRESENT: [ ]mild [ ]moderate [ ]severe [ ]underweight [ ]morbid obesity  https://www.andeal.org/vault/2440/web/files/ONC/Table_Clinical%20Characteristics%20to%20Document%20Malnutrition-White%20JV%20et%20al%2020.pdf    Height (cm): 165.1 (22 @ 10:19)  Weight (kg): 63.5 (22 @ 10:19), 66.8686 (21 @ 09:00)  BMI (kg/m2): 23.3 (22 @ 10:19)    [ ]PPSV2 < or = to 30% [ ]significant weight loss  [ ]poor nutritional intake  [ ]anasarca      [ ]Artificial Nutrition      REFERRALS:   [ ]Chaplaincy  [ ]Hospice  [ ]Child Life  [ ]Social Work  [ ]Case management [ ]Holistic Therapy     Goals of Care Document:  HPI:  76y Female with pmhx of Hep C cirrhosis, HCC s/p IR embolization, HTN, complaining of RUQ abdominal pain for the last 3 weeks.     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     Patient denies fevers/ reports nausea..  (2022 00:08)    PERTINENT PM/SXH:   Hypertension, unspecified type    Thrombocytopenia    Hepatic cirrhosis, unspecified hepatic cirrhosis type, unspecified whether ascites present      Vaginal cyst      FAMILY HISTORY:    ITEMS NOT CHECKED ARE NOT PRESENT    SOCIAL HISTORY:   Significant other/partner[ ]  Children[ ]  Mosque/Spirituality:  Substance hx:  [ ]   Tobacco hx:  [ ]   Alcohol hx: [ ]   Home Opioid hx:  [x ] I-Stop Reference No:[id number]  Living Situation: [x ]Home  [ ]Long term care  [ ]Rehab [ ]Other    ADVANCE DIRECTIVES:    DNR  MOLST  [ ]  Living Will  [ ]   DECISION MAKER(s):  [x ] Health Care Proxy(s)  [ ] Surrogate(s)  [ ] Guardian           Name(s): Phone Number(s): see [facility] note dated today for details, HCP completed on this date. Primary: Nephew Saul, Alternate: Niece Aditi    BASELINE (I)ADL(s) (prior to admission):  [place]: [ ]Total  [ ] Moderate [ ]Dependent    Allergies    No Known Allergies    Intolerances    MEDICATIONS  (STANDING):  influenza  Vaccine (HIGH DOSE) 0.7 milliLiter(s) IntraMuscular once  sodium chloride 0.9%. 500 milliLiter(s) (30 mL/Hr) IV Continuous <Continuous>    MEDICATIONS  (PRN):  ondansetron Injectable 4 milliGRAM(s) IV Push once PRN Nausea and/or Vomiting    PRESENT SYMPTOMS: [ ]  Due to Covid 19 infection data obtained from nursing assessment.  Source if other than patient:  [ ]Family   [ ]Team     Pain: [ ]yes [x ]no- denies pain after paracentesis  QOL impact -   Location -                    Aggravating factors -  Quality -  Radiation -  Timing-  Severity (0-10 scale):  Minimal acceptable level (0-10 scale):     CPOT:    https://www.[facility].org/getattachment/tao95h23-9s5n-0n4w-2u3k-7377y7354j6p/Critical-Care-Pain-Observation-Tool-(CPOT)      PAIN AD Score:     http://geriatrictoolkit.[facility]/cog/painad.pdf (press ctrl +  left click to view)    Dyspnea:                           [ ]Mild [ ]Moderate [ ]Severe  Anxiety:                             [ ]Mild [ ]Moderate [ ]Severe  Fatigue:                             [ ]Mild [ ]Moderate [ ]Severe  Nausea:                             [ ]Mild [ ]Moderate [ ]Severe  Loss of appetite:              [ ]Mild [ ]Moderate [ ]Severe  Constipation:                    [ ]Mild [ ]Moderate [ ]Severe    Other Symptoms:  [ ]All other review of systems negative     Palliative Performance Status Version 2:       60  %    http://[facility].org/files/news/palliative_performance_scale_ppsv2.pdf  PHYSICAL EXAM: DUE TO COVID-19 INFECTION  physical exam findings from the clinician caring for this patient directly are used.   Vital Signs Last 24 Hrs  T(C): 36.5 (2022 14:42), Max: 37.2 (2022 21:18)  T(F): 97.7 (2022 14:42), Max: 99 (2022 21:18)  HR: 92 (2022 14:42) (82 - 99)  BP: 120/77 (2022 14:42) (103/61 - 127/82)  BP(mean): --  RR: 14 (2022 14:42) (14 - 18)  SpO2: 99% (2022 14:42) (96% - 100%) I&O's Summary    GENERAL:  [x ]Alert  [x ]Oriented x3   [ ]Lethargic  [ ]Cachexia  [ ]Unarousable  [ ]Verbal  [ ]Non-Verbal  Behavioral:   [ ] Anxiety  [ ] Delirium [ ] Agitation [ ] Other  HEENT:  [x ]Normal   [ ]Dry mouth   [ ]ET Tube/Trach  [ ]Oral lesions  PULMONARY:   [x ]Clear [ ]Tachypnea  [ ]Audible excessive secretions   [ ]Rhonchi        [ ]Right [ ]Left [ ]Bilateral  [ ]Crackles        [ ]Right [ ]Left [ ]Bilateral  [ ]Wheezing     [ ]Right [ ]Left [ ]Bilateral  [ ]Diminished breath sounds [ ]right [ ]left [ ]bilateral  CARDIOVASCULAR:    [x ]Regular [ ]Irregular [ ]Tachy  [ ]Marin [ ]Murmur [ ]Other  GASTROINTESTINAL:  [x ]Soft  [x ]Distended   [ ]+BS  [ ]Non tender [ ]Tender  [ ]PEG [ ]OGT/ NGT  Last BM:   GENITOURINARY:  [x ]Normal [ ] Incontinent   [ ]Oliguria/Anuria   [ ]Calle  MUSCULOSKELETAL:   [ ]Normal   [x ]Weakness  [ ]Bed/Wheelchair bound [ ]Edema  NEUROLOGIC:   [x ]No focal deficits  [ ]Cognitive impairment  [ ]Dysphagia [ ]Dysarthria [ ]Paresis [ ]Other   SKIN:   [ ]Normal    [ ]Rash  [ ]Pressure ulcer(s)       Present on admission [ ]y [ ]n    CRITICAL CARE:  [ ] Shock Present  [ ]Septic [ ]Cardiogenic [ ]Neurologic [ ]Hypovolemic  [ ]  Vasopressors [ ]  Inotropes   [ ]Respiratory failure present [ ]Mechanical ventilation [ ]Non-invasive ventilatory support [ ]High flow     [ ]Acute  [ ]Chronic [ ]Hypoxic  [ ]Hypercarbic [ ]Other  [ ]Other organ failure     LABS:                        7.7    2.17  )-----------( 84       ( 2022 07:25 )             23.2   01-04    136  |  106  |  25<H>  ----------------------------<  78  4.2   |  20<L>  |  1.05    Ca    7.8<L>      2022 07:34  Phos  2.4     01-04  Mg     2.3     -04    TPro  6.9  /  Alb  3.0<L>  /  TBili  3.0<H>  /  DBili  x   /  AST  337<H>  /  ALT  100<H>  /  AlkPhos  87  01-04  PT/INR - ( 2022 07:06 )   PT: 16.4 sec;   INR: 1.39 ratio             Urinalysis Basic - ( 2022 16:37 )    Color: Yellow / Appearance: Clear / S.017 / pH: x  Gluc: x / Ketone: Negative  / Bili: Negative / Urobili: 2 mg/dL   Blood: x / Protein: Trace / Nitrite: Negative   Leuk Esterase: Negative / RBC: 4 /hpf / WBC 5 /HPF   Sq Epi: x / Non Sq Epi: 1 /hpf / Bacteria: Negative      RADIOLOGY & ADDITIONAL STUDIES:  < from: CT Abdomen and Pelvis No Cont (22 @ 19:11) >    ACC: [id number] EXAM:  CT ABDOMEN AND PELVIS                          PROCEDURE DATE:  2022          INTERPRETATION:  CLINICAL INFORMATION: Abdominal pain and distention.   Progressive hepatocellular carcinoma status post treatment. Right partial    COMPARISON: MR abdomen 2021    CONTRAST/COMPLICATIONS:  IV Contrast: NONE  Oral Contrast: NONE  Complications: None reported at time of study completion    PROCEDURE:  CT of the Abdomen and Pelvis was performed.  Sagittal and coronal reformats were performed.    FINDINGS:  LOWER CHEST: Small right pleural effusion with associated atelectasis..    LIVER: Multiple heterogeneous appearing lesions are again seen. For   reference, there is a peripherally calcified lesion in the right hepatic   lobe measuring approximately 6.8 x 7.1 cm. These lesions are better   appreciated on prior MR dated 2021. Cirrhotic morphology.  BILE DUCTS: Normal caliber.  GALLBLADDER: Within normal limits.  SPLEEN: Splenomegaly.  PANCREAS: Within normal limits.  ADRENALS: Within normal limits.  KIDNEYS/URETERS: Left renal cyst. No hydronephrosis. No renal stones.    BLADDER: Within normal limits.  REPRODUCTIVE ORGANS: Calcified fibroids.    BOWEL: No bowel obstruction. Appendix is not visualized. No evidence of   inflammation in the pericecal region.  PERITONEUM: Large volume ascites.  VESSELS: Atherosclerotic changes.  RETROPERITONEUM/LYMPH NODES: No lymphadenopathy.  ABDOMINAL WALL: Within normal limits.  BONES: Grade 1 anterolisthesis of L4 onL5. Retrolisthesis of L5 on S1.    IMPRESSION:  Multiple heterogeneous appearing hepatic lesions, better appreciated on   prior MR dated 2021, grossly unchanged    Large volume ascites.        --- End of Report ---          YANCI FOLEY MD; Resident Radiology  This document has been electronically signed.  VEL CHESTER MD; Attending Radiologist  This document has been electronically signed. 2022  8:00PM    < end of copied text >      PROTEIN CALORIE MALNUTRITION PRESENT: [ ]mild [ ]moderate [ ]severe [ ]underweight [ ]morbid obesity  https://www.andeal.org/vault/0730/web/files/ONC/Table_Clinical%20Characteristics%20to%20Document%20Malnutrition-White%20JV%20et%20al%2020.pdf    Height (cm): 165.1 (22 @ 10:19)  Weight (kg): 63.5 (22 @ 10:19), 66.8686 (21 @ 09:00)  BMI (kg/m2): 23.3 (22 @ 10:19)    [ ]PPSV2 < or = to 30% [ ]significant weight loss  [ ]poor nutritional intake  [ ]anasarca      [ ]Artificial Nutrition      REFERRALS:   [ ]Chaplaincy  [ ]Hospice  [ ]Child Life  [x ]Social Work  [ x]Case management [ ]Holistic Therapy     Goals of Care Document:

## 2022-01-04 NOTE — PROGRESS NOTE ADULT - SUBJECTIVE AND OBJECTIVE BOX
Overnight events noted      VITAL:  T(C): , Max: 37.2 (22 @ 21:18)  T(F): , Max: 99 (22 @ 21:18)  HR: 88 (22 @ 04:40)  BP: 113/66 (22 @ 04:40)  BP(mean): --  RR: 18 (22 @ 04:40)  SpO2: 96% (22 @ 04:40)      PHYSICAL EXAM:  Constitutional: NAD, Alert  HEENT: NCAT, MMM  Neck: Supple, No JVD  Respiratory: CTA-b/l  Cardiovascular: RRR s1s2, no m/r/g  Gastrointestinal: BS+, soft, NT, (+)mild distension  Extremities: No peripheral edema b/l  Neurological: no focal deficits; strength grossly intact  Back: no CVAT b/l  Skin: No rashes, no nevi    LABS:                        7.7    2.17  )-----------( 84       ( 2022 07:25 )             23.2     Na(136)/K(4.2)/Cl(106)/HCO3(20)/BUN(25)/Cr(1.05)Glu(78)/Ca(7.8)/Mg(--)/PO4(--)     @ 07:34  Na(--)/K(--)/Cl(--)/HCO3(--)/BUN(--)/Cr(--)Glu(--)/Ca(--)/Mg(2.3)/PO4(2.4)     @ 07:23  Na(135)/K(4.0)/Cl(104)/HCO3(19)/BUN(30)/Cr(1.50)Glu(69)/Ca(8.0)/Mg(--)/PO4(--)     @ 07:06  Na(135)/K(3.9)/Cl(102)/HCO3(21)/BUN(32)/Cr(1.57)Glu(103)/Ca(8.1)/Mg(--)/PO4(--)     @ 18:54  Na(135)/K(4.0)/Cl(103)/HCO3(17)/BUN(31)/Cr(1.90)Glu(91)/Ca(8.1)/Mg(2.3)/PO4(3.2)     @ 17:15    Urinalysis Basic - ( 2022 16:37 )  Color: Yellow / Appearance: Clear / S.017 / pH: x  Gluc: x / Ketone: Negative  / Bili: Negative / Urobili: 2 mg/dL   Blood: x / Protein: Trace / Nitrite: Negative   Leuk Esterase: Negative / RBC: 4 /hpf / WBC 5 /HPF   Sq Epi: x / Non Sq Epi: 1 /hpf / Bacteria: Negative        IMPRESSION: 76F w/ HTN, HCV, cirrhosis, and HCC, 22 p/w abdominal pain    (1)TANNER - unclear exact etiology - improved as of today  (2)Hepatology - cirrhosis   (3)Hepatocellular CA - poor overall prognosis - awaiting palliative chemoembolization      RECOMMEND:  (1)BMP daily  (2)Dose new meds for GFR 60ml/min  (3)No renal objection to moving forward with chemoembolization          Ganesh Guidry MD  [facility]  Office: [phone]  Cell: [phone]       Overnight events noted      VITAL:  T(C): , Max: 37.2 (22 @ 21:18)  T(F): , Max: 99 (22 @ 21:18)  HR: 88 (22 @ 04:40)  BP: 113/66 (22 @ 04:40)  BP(mean): --  RR: 18 (22 @ 04:40)  SpO2: 96% (22 @ 04:40)      PHYSICAL EXAM:  Constitutional: NAD, Alert  HEENT: NCAT, MMM  Neck: Supple, No JVD  Respiratory: CTA-b/l  Cardiovascular: RRR s1s2, no m/r/g  Gastrointestinal: BS+, soft, NT, (+)mild distension  Extremities: No peripheral edema b/l  Neurological: no focal deficits; strength grossly intact  Back: no CVAT b/l  Skin: No rashes, no nevi    LABS:                        7.7    2.17  )-----------( 84       ( 2022 07:25 )             23.2     Na(136)/K(4.2)/Cl(106)/HCO3(20)/BUN(25)/Cr(1.05)Glu(78)/Ca(7.8)/Mg(--)/PO4(--)     @ 07:34  Na(--)/K(--)/Cl(--)/HCO3(--)/BUN(--)/Cr(--)Glu(--)/Ca(--)/Mg(2.3)/PO4(2.4)     @ 07:23  Na(135)/K(4.0)/Cl(104)/HCO3(19)/BUN(30)/Cr(1.50)Glu(69)/Ca(8.0)/Mg(--)/PO4(--)     @ 07:06  Na(135)/K(3.9)/Cl(102)/HCO3(21)/BUN(32)/Cr(1.57)Glu(103)/Ca(8.1)/Mg(--)/PO4(--)     @ 18:54  Na(135)/K(4.0)/Cl(103)/HCO3(17)/BUN(31)/Cr(1.90)Glu(91)/Ca(8.1)/Mg(2.3)/PO4(3.2)     @ 17:15    Urinalysis Basic - ( 2022 16:37 )  Color: Yellow / Appearance: Clear / S.017 / pH: x  Gluc: x / Ketone: Negative  / Bili: Negative / Urobili: 2 mg/dL   Blood: x / Protein: Trace / Nitrite: Negative   Leuk Esterase: Negative / RBC: 4 /hpf / WBC 5 /HPF   Sq Epi: x / Non Sq Epi: 1 /hpf / Bacteria: Negative        IMPRESSION: 76F w/ HTN, HCV, cirrhosis, and HCC, 22 p/w abdominal pain    (1)TANNER - prerenal? Improved as of today  (2)Hepatology - cirrhosis   (3)Hepatocellular CA - poor overall prognosis - awaiting palliative chemoembolization      RECOMMEND:  (1)BMP daily  (2)No antihypertensives for now  (3)Dose new meds for GFR 60ml/min  (4)No renal objection to moving forward with chemoembolization          Ganesh Guidry MD  [facility]  Office: [phone]  Cell: [phone]       no pain, no sob      VITAL:  T(C): , Max: 37.2 (22 @ 21:18)  T(F): , Max: 99 (22 @ 21:18)  HR: 88 (22 @ 04:40)  BP: 113/66 (22 @ 04:40)  BP(mean): --  RR: 18 (22 @ 04:40)  SpO2: 96% (22 @ 04:40)      PHYSICAL EXAM:  Constitutional: NAD, Alert  HEENT: NCAT, MMM  Neck: Supple, No JVD  Respiratory: CTA-b/l  Cardiovascular: RRR s1s2, no m/r/g  Gastrointestinal: BS+, soft, NT, (+)mild distension  Extremities: No peripheral edema b/l  Neurological: no focal deficits; strength grossly intact  Back: no CVAT b/l  Skin: No rashes, no nevi    LABS:                        7.7    2.17  )-----------( 84       ( 2022 07:25 )             23.2     Na(136)/K(4.2)/Cl(106)/HCO3(20)/BUN(25)/Cr(1.05)Glu(78)/Ca(7.8)/Mg(--)/PO4(--)     @ 07:34  Na(--)/K(--)/Cl(--)/HCO3(--)/BUN(--)/Cr(--)Glu(--)/Ca(--)/Mg(2.3)/PO4(2.4)     @ 07:23  Na(135)/K(4.0)/Cl(104)/HCO3(19)/BUN(30)/Cr(1.50)Glu(69)/Ca(8.0)/Mg(--)/PO4(--)     @ 07:06  Na(135)/K(3.9)/Cl(102)/HCO3(21)/BUN(32)/Cr(1.57)Glu(103)/Ca(8.1)/Mg(--)/PO4(--)     @ 18:54  Na(135)/K(4.0)/Cl(103)/HCO3(17)/BUN(31)/Cr(1.90)Glu(91)/Ca(8.1)/Mg(2.3)/PO4(3.2)     @ 17:15    Urinalysis Basic - ( 2022 16:37 )  Color: Yellow / Appearance: Clear / S.017 / pH: x  Gluc: x / Ketone: Negative  / Bili: Negative / Urobili: 2 mg/dL   Blood: x / Protein: Trace / Nitrite: Negative   Leuk Esterase: Negative / RBC: 4 /hpf / WBC 5 /HPF   Sq Epi: x / Non Sq Epi: 1 /hpf / Bacteria: Negative        IMPRESSION: 76F w/ HTN, HCV, cirrhosis, and HCC, 22 p/w abdominal pain    (1)TANNER - prerenal? Improved as of today  (2)Hepatology - cirrhosis   (3)Hepatocellular CA - poor overall prognosis - awaiting palliative chemoembolization      RECOMMEND:  (1)BMP daily  (2)No antihypertensives for now  (3)Dose new meds for GFR 60ml/min  (4)No renal objection to moving forward with chemoembolization          Ganesh Guidry MD  [facility]  Office: [phone]  Cell: [phone]

## 2022-01-05 NOTE — PROGRESS NOTE ADULT - ASSESSMENT
This patient is a 77yo female with PMH of Hep C with cirrhosis, HCC s/p IR embolization with recent progression, HTN, who presented with SOB and worsened abdominal pain, now s/p IR paracentesis, also noted to have anemia.     #Anemia:  - peripheral smear was reviewed on 1/2/2022 without signs of hemolysis.  Reticulocyte index is low at 1.16, suggesting hypoproliferation  - Hgb has remained stable and patient denies black or bloody stool. Check iron studies and ferritin.   - Continue to monitor Hgb.     #HCC and HepC cirrhosis   This patient has an elevated Child Fisher score of 10, if we consider that she had ascites which required drainage. Will discuss with Dr. Carreno regarding plan for IR embolization    #TANNER: resolving    We will follow with you. Please do not hesitate to page with questions.     Mary Butler MD PGY4   [phone]  Hematology-Oncology Fellow

## 2022-01-05 NOTE — PROGRESS NOTE ADULT - ASSESSMENT
76y Female with pmhx of Hep C cirrhosis, HCC s/p IR embolization, HTN, complaining of RUQ abdominal pain for the last 3 weeks.       IR re consult fro Embolization   Ascites Malignancy related GI/ Hetology consult appreciated   IR following s/p paracentesis   Advance diet     HCC Hepatology following    s/ p EGD       Anemia in the setting of Cirrhosis and HCC- Transfuse prn.   Anemic profile.

## 2022-01-05 NOTE — DISCHARGE NOTE PROVIDER - HOSPITAL COURSE
76y Female with pmhx of Hep C cirrhosis, HCC s/p SIRT 11/4/2020, HTN, complaining of RUQ abdominal pain for the last 3 weeks. s/p EGD 1/4/22- PHG, no varices, mild schatzki ring, gastric polyps, duodenal lipoma; plan for Y90 of HCC lesion    #Decompensated HCV cirrhosis (GT1a) c/b ascites, thrombocytopenia  #R lobe HCC in seg 6/7 (dx 08/2020, s/p R lobe SIRT 11/2020 now with recent disease progression)  Dx HCV in 1990's (exposure thought 2/2 blood transfusion in 1988 s/p MCV or exposure to unsterile tx from injection for pain control). She reports taking medication previously, but does not recall the exact treatment. Referred to liver clinic 08/2020- w/u showed fibroscan - 19.9 kPa suggestive of cirrhosis, , MRI and US w/ 7.5 cm lesion in R hepatic lob seg 6/7 c/w HCC (LI-RADS 5). No e/o met disease on bone scans EGD 9/30/2020- Small (< 5 mm) EV, GAVE w/o bleeding, gastric polyps s/p bx, nodule in duodenum s/p bx. C-scope 9/30/2020- Internal hemorrhoids, no specimens collected. s/p IR random liver bx 10/2020 with HVPG 11 mmHg and path w/ chronic HCV and cirrhosis mild steatohepatitis. s/p IR R lobe SIRT 11/2020. F/u MR abd 02/2021 showing decreased areas of internal enhancement c/w SIRT tx. Was lost to f/u a few months after SIRT. 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 Per outpt IR note from Dr. Ramirez, Given size of lesions and that she is not a surgical candidate, recommending radioembolization as palliative treatment.    -Ascites: large volume ascites on CT A/P this admission; s/p para 1/3 with 3.6 L removed  -Varices: EGD 1/4/22- PHG, no varices  -HE: none on exam    #c/f bleeding from HCC lesion- found to have Hgb drop 13 -> 7.5 in 2 months; s/p 1 u pRBC; Hgb stable at ~8 (BL 13); CTA A/P large confluent masses of partially necrotic HCC in R liver lobe w/ liver capsule involvement in seg 7/8, no active contrast extravasate to suggest active bleeding, liver cirrhosis with portal HTN, additional HCC in L hepatic lobe  -1/7- IR hepatic arteriogram, possible bland embolization, y90 Mapping     - S/p paracentesis 1/12  - C/w diuretics: lasix 40 mg daily + spironolactone 100 mg daily  - Plan for Y90 next week by IR as outaptient

## 2022-01-05 NOTE — DISCHARGE NOTE PROVIDER - NSDCCPCAREPLAN_GEN_ALL_CORE_FT
PRINCIPAL DISCHARGE DIAGNOSIS  Diagnosis: Hepatocellular carcinoma  Assessment and Plan of Treatment: Follow up with Dr. Carreno for radioembolization in IR, Booking [phone]. Call for a consult with  for procedure      SECONDARY DISCHARGE DIAGNOSES  Diagnosis: Anemia due to acute blood loss  Assessment and Plan of Treatment: Follow up with oncology    Diagnosis: Thrombocytopenia  Assessment and Plan of Treatment: Follow up with hematology for managment     PRINCIPAL DISCHARGE DIAGNOSIS  Diagnosis: Hepatocellular carcinoma  Assessment and Plan of Treatment: Follow up with Dr. Carreno for radioembolization in , Booking [phone]. Call for a consult with  for procedure      SECONDARY DISCHARGE DIAGNOSES  Diagnosis: Anemia due to acute blood loss  Assessment and Plan of Treatment: Follow up with oncology    Diagnosis: Thrombocytopenia  Assessment and Plan of Treatment: Follow up with hematology for managment    Diagnosis: Ascites  Assessment and Plan of Treatment: Paracentesis performed 1/3/22 3.6 liter removed     PRINCIPAL DISCHARGE DIAGNOSIS  Diagnosis: Hepatocellular carcinoma  Assessment and Plan of Treatment: Follow up with Dr. Carreno for radioembolization in , Booking [phone]. Call for a consult with  for procedure      SECONDARY DISCHARGE DIAGNOSES  Diagnosis: Anemia due to acute blood loss  Assessment and Plan of Treatment: Follow up with oncology    Diagnosis: Thrombocytopenia  Assessment and Plan of Treatment: Follow up with hematology for managment    Diagnosis: Ascites  Assessment and Plan of Treatment: Paracentesis performed on 1/3/22 3.6 liter removed  Paracentesis performed again on 1/12/22 2.8 liter removed  Outpatient hepatology follow up     PRINCIPAL DISCHARGE DIAGNOSIS  Diagnosis: Hepatocellular carcinoma  Assessment and Plan of Treatment: - Seen by hepatology, outpatient follow up   - Follow up with Dr. Carreno for radioembolization in IR,  - Booking office number is [phone].   - Call for a consult with  for procedure within 1 week.        SECONDARY DISCHARGE DIAGNOSES  Diagnosis: Abdominal pain  Assessment and Plan of Treatment: - Seem by hepatology  - Bleeding from HCC lesion- found to have Hgb drop 13 -> 7.5 in 2 months  - Received  u pRBC;  - CTA A/P large confluent masses of partially necrotic HCC in R liver lobe w/ liver capsule involvement in seg 7/8, no active contrast extravasate to suggest active bleeding, liver cirrhosis with portal HTN, additional HCC in L hepatic lobe  - Had IR hepatic arteriogram, with embolization  - Pending y90 Mapping, call IR to make an appointment for y90 next week    Diagnosis: Anemia due to acute blood loss  Assessment and Plan of Treatment: - Follow up with oncology  - Now stable    Diagnosis: Thrombocytopenia  Assessment and Plan of Treatment: - Follow up with hematology for managment    Diagnosis: Ascites  Assessment and Plan of Treatment: - Paracentesis performed on 1/3/22 3.6 liter removed  - Paracentesis performed again on 1/12/22 2.8 liter removed  - Outpatient hepatology follow up    Diagnosis: Hypertension  Assessment and Plan of Treatment: - BP stable   - Home blood pressure med on hold  - Monitoring blood pressures  - Outaptient follow up for BP management

## 2022-01-05 NOTE — PROGRESS NOTE ADULT - SUBJECTIVE AND OBJECTIVE BOX
Patient is a 76y old  Female who presents with a chief complaint of     SUBJECTIVE / OVERNIGHT EVENTS: no events over night     T(C): 36.9 (22 @ 20:09), Max: 37.2 (22 @ 14:16)  HR: 62 (22 @ 20:09) (62 - 91)  BP: 113/74 (22 @ 20:09) (113/74 - 124/77)  RR: 18 (22 @ 20:09) (18 - 18)  SpO2: 99% (22 @ 20:09) (98% - 99%)      MEDICATIONS  (STANDING):  influenza  Vaccine (HIGH DOSE) 0.7 milliLiter(s) IntraMuscular once  sodium chloride 0.9%. 500 milliLiter(s) (30 mL/Hr) IV Continuous <Continuous>    MEDICATIONS  (PRN):  ondansetron Injectable 4 milliGRAM(s) IV Push once PRN Nausea and/or Vomiting  POCT Blood Glucose.: 87 mg/dL (2022 06:26)    I&O's Summary                                              8.3    2.20  )-----------( 100      ( 2022 10:36 )             25.8                           8.3    2.20  )-----------( 100      ( 2022 10:36 )             25.8           LIVER FUNCTIONS - ( 2022 10:36 )  Alb: 3.0 g/dL / Pro: 7.2 g/dL / ALK PHOS: 87 U/L / ALT: 101 U/L / AST: 312 U/L / GGT: x             138|106|18<94  3.2|21|0.89  7.9,--,--   @ 10:36        LIVER FUNCTIONS - ( 2022 10:36 )  Alb: 3.0 g/dL / Pro: 7.2 g/dL / ALK PHOS: 87 U/L / ALT: 101 U/L / AST: 312 U/L / GGT: x             138|106|18<94  3.2|21|0.89  7.9,--,--   @ 10:36  PHYSICAL EXAM:  GENERAL: NAD, well-developed  HEAD:  Atraumatic, Normocephalic  EYES: EOMI, PERRLA, conjunctiva and sclera clear  NECK: Supple, No JVD  CHEST/LUNG: Clear to auscultation bilaterally; No wheeze  HEART: Regular rate and rhythm; No murmurs, rubs, or gallops  ABDOMEN: Soft, Nontender, Nondistended; Bowel sounds present  EXTREMITIES:  2+ Peripheral Pulses, No clubbing, cyanosis, or edema  PSYCH: AAOx3  NEUROLOGY: non-focal  SKIN: No rashes or lesions                        Urinalysis Basic - ( 2022 16:37 )    Color: Yellow / Appearance: Clear / S.017 / pH: x  Gluc: x / Ketone: Negative  / Bili: Negative / Urobili: 2 mg/dL   Blood: x / Protein: Trace / Nitrite: Negative   Leuk Esterase: Negative / RBC: 4 /hpf / WBC 5 /HPF   Sq Epi: x / Non Sq Epi: 1 /hpf / Bacteria: Negative        RADIOLOGY & ADDITIONAL TESTS:    Imaging Personally Reviewed:    Consultant(s) Notes Reviewed:      Care Discussed with Consultants/Other Providers:

## 2022-01-05 NOTE — DISCHARGE NOTE PROVIDER - CARE PROVIDER_API CALL
allan, [facility]    Phone: (   )    -  Fax: (   )    -  Follow Up Time:    Luciana Villanueva)  Gastroenterology; Internal Medicine  [address] [place] [address]  Phone: [phone]  Fax: [phone]  Follow Up Time: 2 weeks    huey lemus  [phone]  Phone: (   )    -  Fax: (   )    -  Follow Up Time: 1 week   Luciana Villanueva)  Gastroenterology; Internal Medicine  [address] [place] [address]  Phone: [phone]  Fax: [phone]  Follow Up Time: 1 week    huey lemus  [phone]  Phone: (   )    -  Fax: (   )    -  Follow Up Time: 1 week

## 2022-01-05 NOTE — DISCHARGE NOTE PROVIDER - CARE PROVIDERS DIRECT ADDRESSES
,DirectAddress_Unknown ,[email or web address],DirectAddress_Unknown

## 2022-01-05 NOTE — CONSULT NOTE ADULT - ASSESSMENT
Interventional Radiology    Evaluate for Procedure: Y90    HPI: 76y Female with PMHx hep. C cirrhosis, HCC s/p IR embolization, HTN, complaining of RUQ abdominal pain for 3 weeks prior to admission. Patient told to come back for evaluation for repeat IR embolization as MRI 2 weeks prior to admission showed progression of disease. Of note, patient received paracentesis 1/3/22, 3.6L fluid removed.    Allergies:   Medications (Abx/Cardiac/Anticoagulation/Blood Products)      Data:    T(C): 36.9  HR: 85  BP: 122/75  RR: 18  SpO2: 98%    -WBC 2.20 / HgB 8.3 / Hct 25.8 / Plt 100  -Na 138 / Cl 106 / BUN 18 / Glucose 94  -K 3.2 / CO2 21 / Cr 0.89  - / Alk Phos 87 / T.Bili 3.1  -INR 1.39 / PTT --      Radiology:   MR Abd 12/20/21: Marked progression of disease of segment 7 posterior and superior to partially treated lesion in the right hepatic lobe. Now associated tumor in vein involving branches of right portal vein. New infiltrative disease in segments 2 and 3 with associated tumor in vein involving branches of left portal vein.     CTAP 1/1/22: Multiple heterogeneous appearing hepatic lesions consistent with those seen on MR Abd 12/20/21    Assessment/Plan: 76y Female with PMHx hep. C cirrhosis, HCC s/p IR embolization, HTN, complaining of RUQ abdominal pain for 3 weeks prior to admission. Patient told to come back for evaluation for repeat IR embolization as MRI 2 weeks prior to admission showed progression of disease. IR consulted for Y90.    -- If patient stable for discharge, can follow up with Dr. Carreno for outpatient consult. IR booking office [phone].    --  Laura Gatica MD  IR Pager [phone]

## 2022-01-05 NOTE — DISCHARGE NOTE PROVIDER - DETAILS OF MALNUTRITION DIAGNOSIS/DIAGNOSES
This patient has been assessed with a concern for Malnutrition and was treated during this hospitalization for the following Nutrition diagnosis/diagnoses:     -  01/11/2022: Severe protein-calorie malnutrition

## 2022-01-05 NOTE — PROGRESS NOTE ADULT - SUBJECTIVE AND OBJECTIVE BOX
Overnight events noted      VITAL:  T(C): , Max: 37.6 (01-04-22 @ 20:44)  T(F): , Max: 99.6 (01-04-22 @ 20:44)  HR: 107 (01-05-22 @ 04:17)  BP: 125/78 (01-05-22 @ 04:17)  BP(mean): --  RR: 18 (01-05-22 @ 04:17)  SpO2: 98% (01-05-22 @ 04:17)      PHYSICAL EXAM:  Constitutional: NAD, Alert  HEENT: NCAT, MMM  Neck: Supple, No JVD  Respiratory: CTA-b/l  Cardiovascular: RRR s1s2, no m/r/g  Gastrointestinal: BS+, soft, NT, (+)mild distension  Extremities: No peripheral edema b/l  Neurological: no focal deficits; strength grossly intact  Back: no CVAT b/l  Skin: No rashes, no nevi    LABS:                        7.7    2.17  )-----------( 84       ( 04 Jan 2022 07:25 )             23.2     Na(136)/K(4.2)/Cl(106)/HCO3(20)/BUN(25)/Cr(1.05)Glu(78)/Ca(7.8)/Mg(--)/PO4(--)    01-04 @ 07:34  Na(--)/K(--)/Cl(--)/HCO3(--)/BUN(--)/Cr(--)Glu(--)/Ca(--)/Mg(2.3)/PO4(2.4)    01-04 @ 07:23  Na(135)/K(4.0)/Cl(104)/HCO3(19)/BUN(30)/Cr(1.50)Glu(69)/Ca(8.0)/Mg(--)/PO4(--)    01-03 @ 07:06  Na(135)/K(3.9)/Cl(102)/HCO3(21)/BUN(32)/Cr(1.57)Glu(103)/Ca(8.1)/Mg(--)/PO4(--)    01-02 @ 18:54      IMPRESSION: 76F w/ HTN, HCV, cirrhosis, and HCC, 1/1/22 p/w abdominal pain    (1)TANNER - prerenal? Resolved as of yesterday; no labs back of yet for today  (2)Hepatology - cirrhosis   (3)Hepatocellular CA - poor overall prognosis - potentially for repeat chemoembolization      RECOMMEND:  (1)BMP daily  (2)No antihypertensives for now  (3)Dose new meds for GFR 60ml/min  (4)No renal objection to moving forward with chemoembolization              Ganesh Guidry MD  [facility]  Office: [phone]  Cell: [phone]       (+)abdominal bloating    VITAL:  T(C): , Max: 37.6 (01-04-22 @ 20:44)  T(F): , Max: 99.6 (01-04-22 @ 20:44)  HR: 107 (01-05-22 @ 04:17)  BP: 125/78 (01-05-22 @ 04:17)  BP(mean): --  RR: 18 (01-05-22 @ 04:17)  SpO2: 98% (01-05-22 @ 04:17)      PHYSICAL EXAM:  Constitutional: NAD, Alert  HEENT: NCAT, MMM  Neck: Supple, No JVD  Respiratory: CTA-b/l  Cardiovascular: RRR s1s2, no m/r/g  Gastrointestinal: BS+, soft, NT, (+)mild distension  Extremities: No peripheral edema b/l  Neurological: no focal deficits; strength grossly intact  Back: no CVAT b/l  Skin: No rashes, no nevi    LABS:                        7.7    2.17  )-----------( 84       ( 04 Jan 2022 07:25 )             23.2     Na(136)/K(4.2)/Cl(106)/HCO3(20)/BUN(25)/Cr(1.05)Glu(78)/Ca(7.8)/Mg(--)/PO4(--)    01-04 @ 07:34  Na(--)/K(--)/Cl(--)/HCO3(--)/BUN(--)/Cr(--)Glu(--)/Ca(--)/Mg(2.3)/PO4(2.4)    01-04 @ 07:23  Na(135)/K(4.0)/Cl(104)/HCO3(19)/BUN(30)/Cr(1.50)Glu(69)/Ca(8.0)/Mg(--)/PO4(--)    01-03 @ 07:06  Na(135)/K(3.9)/Cl(102)/HCO3(21)/BUN(32)/Cr(1.57)Glu(103)/Ca(8.1)/Mg(--)/PO4(--)    01-02 @ 18:54      IMPRESSION: 76F w/ HTN, HCV, cirrhosis, and HCC, 1/1/22 p/w abdominal pain    (1)TANNER - prerenal? Resolved as of yesterday; no labs back of yet for today  (2)Hepatology - cirrhosis   (3)Hepatocellular CA - poor overall prognosis - potentially for repeat chemoembolization      RECOMMEND:  (1)BMP daily  (2)No antihypertensives for now  (3)Dose new meds for GFR 60ml/min  (4)No renal objection to moving forward with chemoembolization              Ganesh Guidry MD  [facility]  Office: [phone]  Cell: [phone]

## 2022-01-05 NOTE — PROGRESS NOTE ADULT - SUBJECTIVE AND OBJECTIVE BOX
Hematology Oncology Follow-up    INTERVAL HPI/OVERNIGHT EVENTS:  Patient has been eating more today. She still endorses mild abdominal distension. She has been ambulating short distances to the bathroom without pain.     REVIEW OF SYSTEMS  CONSTITUTIONAL: No fever, no chills, no fatigue  EYES: No eye pain, no vision changes  ENMT:  No difficulty hearing, no throat pain  RESPIRATORY: No cough, No shortness of breath  CARDIOVASCULAR: No chest pain, no palpitations, no leg swelling  GASTROINTESTINAL: No abdominal pain, no nausea, no vomiting, + abd distension, no constipation  GENITOURINARY: No dysuria, no hematuria  NEUROLOGICAL: No headaches, no loss of strength,   SKIN: No itching, no rashes, no lesions   MUSCULOSKELETAL: No joint pain, no joint swelling; No muscle pain  HEME/LYMPH: No easy bruising, bleeding    VITAL SIGNS:  T(F): 98.5 (01-05-22 @ 16:08)  HR: 85 (01-05-22 @ 16:08)  BP: 122/75 (01-05-22 @ 16:08)  RR: 18 (01-05-22 @ 16:08)  SpO2: 98% (01-05-22 @ 16:08)  Wt(kg): --    01-04-22 @ 07:01  -  01-05-22 @ 07:00  --------------------------------------------------------  IN: 260 mL / OUT: 0 mL / NET: 260 mL    01-05-22 @ 07:01  -  01-05-22 @ 19:31  --------------------------------------------------------  IN: 500 mL / OUT: 0 mL / NET: 500 mL        T(C): 36.9 (01-05-22 @ 16:08), Max: 37.6 (01-04-22 @ 20:44)  HR: 85 (01-05-22 @ 16:08) (85 - 107)  BP: 122/75 (01-05-22 @ 16:08) (113/74 - 135/89)  RR: 18 (01-05-22 @ 16:08) (18 - 18)  SpO2: 98% (01-05-22 @ 16:08) (98% - 99%)  Wt(kg): --    PHYSICAL EXAM:  GENERAL: NAD, well-groomed, well-developed  HEAD:  Atraumatic, Normocephalic  EYES: EOMI, PERRLA, conjunctiva clear, mild scleral icterus  ENMT: No oropharyngeal exudates, Moist mucous membranes  NECK: Supple, no cervical lymphadenopathy,   NERVOUS SYSTEM:  Alert & Oriented X3, CN II-XII intact, full sensation to light touch   CHEST/LUNG: Clear to auscultation bilaterally; no rhonchi  HEART: Regular rate and rhythm; No murmurs  ABDOMEN: Soft, mild abdominal distension, minimal tenderness to palpation  EXTREMITIES:  2+ radial Pulses, No cyanosis or edema  SKIN: warm, dry    MEDICATIONS  (STANDING):  influenza  Vaccine (HIGH DOSE) 0.7 milliLiter(s) IntraMuscular once  sodium chloride 0.9%. 500 milliLiter(s) (30 mL/Hr) IV Continuous <Continuous>    MEDICATIONS  (PRN):  ondansetron Injectable 4 milliGRAM(s) IV Push once PRN Nausea and/or Vomiting      No Known Allergies      LABS:                        8.3    2.20  )-----------( 100      ( 05 Jan 2022 10:36 )             25.8     01-05    138  |  106  |  18  ----------------------------<  94  3.2<L>   |  21<L>  |  0.89    Ca    7.9<L>      05 Jan 2022 10:36  Phos  2.4     01-04  Mg     2.3     01-04    TPro  7.2  /  Alb  3.0<L>  /  TBili  3.1<H>  /  DBili  x   /  AST  312<H>  /  ALT  101<H>  /  AlkPhos  87  01-05    RADIOLOGY & ADDITIONAL TESTS:  Studies reviewed.

## 2022-01-05 NOTE — CONSULT NOTE ADULT - ATTENDING COMMENTS
76y Female with PMHx hep. C cirrhosis, HCC s/p IR embolization, HTN, complaining of RUQ abdominal pain for 3 weeks prior to admission. Patient told to come back for evaluation for repeat IR embolization as MRI 2 weeks prior to admission showed progression of disease. IR consulted for Y90. Pt was previously treated with Y90 in 11/2020 with subsequent progression of disease. The patient's current bilirubin is greater than 3 which makes Y90 a less favorable option at this time. However, we will review all available options for treatment including chemoembolization. She should see IR in the clinic for consultation and procedural planning.     Natasha Carreno MD  Interventional Radiology 76y Female with PMHx hep. C cirrhosis, HCC s/p IR embolization, HTN, complaining of RUQ abdominal pain for 3 weeks prior to admission. Patient told to come back for evaluation for repeat IR embolization as MRI 2 weeks prior to admission showed progression of disease. IR consulted for Y90. Pt was previously treated with Y90 in 11/2020 with subsequent progression of disease. The patient's current bilirubin is greater than 3 which makes Y90 a less favorable option at this time. However, we will review all available options for treatment including chemoembolization. We will discuss the potential for chemoembolization vs bland embolization for palliation possibly during this admission.    Natasha Carreno MD  Interventional Radiology

## 2022-01-05 NOTE — PROGRESS NOTE ADULT - SUBJECTIVE AND OBJECTIVE BOX
Chief Complaint:  Patient is a 76y old  Female who presents with a chief complaint of     Interval Events: Reports feeling well. Denies any N/V/D/C, abd pain, melena or hematochezia.      Hospital Medications:  influenza  Vaccine (HIGH DOSE) 0.7 milliLiter(s) IntraMuscular once  ondansetron Injectable 4 milliGRAM(s) IV Push once PRN  sodium chloride 0.9%. 500 milliLiter(s) IV Continuous <Continuous>      PMHX/PSHX:  Hypertension, unspecified type    Thrombocytopenia    Hepatic cirrhosis, unspecified hepatic cirrhosis type, unspecified whether ascites present    Vaginal cyst            ROS: 14 point ROS negative unless otherwise stated in subjective      PHYSICAL EXAM:     GENERAL:  Appears stated age, well-groomed, well-nourished, no distress  HEENT:  NC/AT,  conjunctivae clear and pink  CHEST:  Full & symmetric excursion, no increased effort, breath sounds clear  HEART:  Regular rhythm, S1, S2, no murmur/rub/S3/S4  ABDOMEN:  Soft, non-tender, +moderately distended, normoactive bowel sounds,    EXTREMITIES:  no cyanosis, clubbing or edema  SKIN:  No rash/erythema/ecchymoses/petechiae/wounds/abscess/warm/dry  NEURO:  Alert, orientedx3, no asterixis    Vital Signs:  Vital Signs Last 24 Hrs  T(C): 36.8 (05 Jan 2022 04:17), Max: 37.6 (04 Jan 2022 20:44)  T(F): 98.2 (05 Jan 2022 04:17), Max: 99.6 (04 Jan 2022 20:44)  HR: 107 (05 Jan 2022 04:17) (82 - 107)  BP: 125/78 (05 Jan 2022 04:17) (103/61 - 129/77)  BP(mean): --  RR: 18 (05 Jan 2022 04:17) (14 - 18)  SpO2: 98% (05 Jan 2022 04:17) (97% - 99%)  Daily Height in cm: 165.1 (04 Jan 2022 10:19)    Daily     LABS:                        7.7    2.17  )-----------( 84       ( 04 Jan 2022 07:25 )             23.2     01-04    136  |  106  |  25<H>  ----------------------------<  78  4.2   |  20<L>  |  1.05    Ca    7.8<L>      04 Jan 2022 07:34  Phos  2.4     01-04  Mg     2.3     01-04    TPro  6.9  /  Alb  3.0<L>  /  TBili  3.0<H>  /  DBili  x   /  AST  337<H>  /  ALT  100<H>  /  AlkPhos  87  01-04    LIVER FUNCTIONS - ( 04 Jan 2022 07:34 )  Alb: 3.0 g/dL / Pro: 6.9 g/dL / ALK PHOS: 87 U/L / ALT: 100 U/L / AST: 337 U/L / GGT: x                   Imaging: No new abdominal imaging               Chief Complaint:  Patient is a 76y old  Female who presents with a chief complaint of     Interval Events:   Reports feeling well but continues to have abdominal distension. Denies any N/V/D/C, abd pain, melena or hematochezia.      Hospital Medications:  influenza  Vaccine (HIGH DOSE) 0.7 milliLiter(s) IntraMuscular once  ondansetron Injectable 4 milliGRAM(s) IV Push once PRN  sodium chloride 0.9%. 500 milliLiter(s) IV Continuous <Continuous>      PMHX/PSHX:  Hypertension, unspecified type    Thrombocytopenia    Hepatic cirrhosis, unspecified hepatic cirrhosis type, unspecified whether ascites present    Vaginal cyst            ROS: 14 point ROS negative unless otherwise stated in subjective      PHYSICAL EXAM:     GENERAL:  Appears stated age, well-groomed, well-nourished, no distress  HEENT:  NC/AT,  conjunctivae clear and pink  CHEST:  Full & symmetric excursion, no increased effort, breath sounds clear  HEART:  Regular rhythm, S1, S2, no murmur/rub/S3/S4  ABDOMEN:  Soft, non-tender, +mildly-moderately distended, normoactive bowel sounds,    EXTREMITIES:  no cyanosis, clubbing or edema  SKIN:  No rash/erythema/ecchymoses/petechiae/wounds/abscess/warm/dry  NEURO:  Alert, orientedx3, no asterixis    Vital Signs:  Vital Signs Last 24 Hrs  T(C): 36.8 (05 Jan 2022 04:17), Max: 37.6 (04 Jan 2022 20:44)  T(F): 98.2 (05 Jan 2022 04:17), Max: 99.6 (04 Jan 2022 20:44)  HR: 107 (05 Jan 2022 04:17) (82 - 107)  BP: 125/78 (05 Jan 2022 04:17) (103/61 - 129/77)  BP(mean): --  RR: 18 (05 Jan 2022 04:17) (14 - 18)  SpO2: 98% (05 Jan 2022 04:17) (97% - 99%)  Daily Height in cm: 165.1 (04 Jan 2022 10:19)    Daily     LABS:                        7.7    2.17  )-----------( 84       ( 04 Jan 2022 07:25 )             23.2     01-04    136  |  106  |  25<H>  ----------------------------<  78  4.2   |  20<L>  |  1.05    Ca    7.8<L>      04 Jan 2022 07:34  Phos  2.4     01-04  Mg     2.3     01-04    TPro  6.9  /  Alb  3.0<L>  /  TBili  3.0<H>  /  DBili  x   /  AST  337<H>  /  ALT  100<H>  /  AlkPhos  87  01-04    LIVER FUNCTIONS - ( 04 Jan 2022 07:34 )  Alb: 3.0 g/dL / Pro: 6.9 g/dL / ALK PHOS: 87 U/L / ALT: 100 U/L / AST: 337 U/L / GGT: x                   Imaging: No new abdominal imaging

## 2022-01-05 NOTE — DISCHARGE NOTE PROVIDER - NSDCMRMEDTOKEN_GEN_ALL_CORE_FT
amLODIPine 10 mg oral tablet: 1 tab(s) orally once a day  lisinopril 10 mg oral tablet: 1 tab(s) orally once a day   furosemide 40 mg oral tablet: 1 tab(s) orally once a day  spironolactone 25 mg oral tablet: 4 tab(s) orally once a day

## 2022-01-05 NOTE — DISCHARGE NOTE PROVIDER - NSDCFUADDAPPT_GEN_ALL_CORE_FT
- IR Booking office number is [phone].  - Call for a consult with  for procedure within 1 week. - Follow up PCP within 2 weeks

## 2022-01-05 NOTE — PROGRESS NOTE ADULT - ASSESSMENT
76y Female with pmhx of Hep C cirrhosis, HCC s/p SIRT 11/4/2020, HTN, complaining of RUQ abdominal pain for the last 3 weeks.    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 She reports taking medication previously, but does not recall the exact treatment. Referred to liver clinic 08/2020- w/u showed fibroscan - 19.9 kPa suggestive of cirrhosis, , MRI and US w/ 7.5 cm lesion in R hepatic lob seg 6/7 c/w HCC (LI-RADS 5). No e/o met disease on bone scans EGD 9/30/2020- Small (< 5 mm) EV, GAVE w/o bleeding, gastric polyps s/p bx, nodule in duodenum s/p bx. C-scope 9/30/2020- Internal hemorrhoids, no specimens collected. s/p IR random liver bx 10/2020 with HVPG 11 mmHg and path w/ chronic HCV and cirrhosis mild steatohepatitis. s/p IR R lobe SIRT 11/2020. F/u MR abd 02/2021 showing decreased areas of internal enhancement c/w SIRT tx. Was lost to f/u a few months after SIRT. 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 Per outpt IR note from Dr. Ramirez, Given size of lesions and that she is not a surgical candidate, recommending radioembolization as palliative treatment.    -Ascites: large volume ascites on CT A/P this admission; s/p para 1/3 with 3.6 L removed  -Varices: EGD 9/30/2020- Small (< 5 mm) EV  -HE: none on exam    #acute normocytic anemia- found to have Hgb drop 13 -> 7.5 in 2 months; s/p 1 u pRBC; Hgb stable at 8 (BL 13)    Recommendations:  -trend CBC, CMP, INR  -appreciate IR and oncology recs      Selin Diaz MD  GI Fellow, PGY-4  Available via Microsoft Teams    NON-URGENT CONSULTS:  Please email [email or web address]  AT NIGHT AND ON WEEKENDS:  Contact on-call GI fellow via answering service ([phone]) from 5pm-8am and on weekends/holidays  MONDAY-FRIDAY 8AM-5PM:  Pager# [phone] ([facility]) or [phone] ([facility])  GI Phone# [phone] ([facility])   76y Female with pmhx of Hep C cirrhosis, HCC s/p SIRT 11/4/2020, HTN, complaining of RUQ abdominal pain for the last 3 weeks.    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 She reports taking medication previously, but does not recall the exact treatment. Referred to liver clinic 08/2020- w/u showed fibroscan - 19.9 kPa suggestive of cirrhosis, , MRI and US w/ 7.5 cm lesion in R hepatic lob seg 6/7 c/w HCC (LI-RADS 5). No e/o met disease on bone scans EGD 9/30/2020- Small (< 5 mm) EV, GAVE w/o bleeding, gastric polyps s/p bx, nodule in duodenum s/p bx. C-scope 9/30/2020- Internal hemorrhoids, no specimens collected. s/p IR random liver bx 10/2020 with HVPG 11 mmHg and path w/ chronic HCV and cirrhosis mild steatohepatitis. s/p IR R lobe SIRT 11/2020. F/u MR abd 02/2021 showing decreased areas of internal enhancement c/w SIRT tx. Was lost to f/u a few months after SIRT. 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 Per outpt IR note from Dr. Ramirez, Given size of lesions and that she is not a surgical candidate, recommending radioembolization as palliative treatment.    -Ascites: large volume ascites on CT A/P this admission; s/p para 1/3 with 3.6 L removed  -Varices: EGD 9/30/2020- Small (< 5 mm) EV  -HE: none on exam    #acute normocytic anemia- found to have Hgb drop 13 -> 7.5 in 2 months; s/p 1 u pRBC; Hgb stable at 8 (BL 13)    Recommendations:  -trend CBC, CMP, INR  -appreciate IR, palliative care and oncology recs  -for abdominal distension can start lasix 20 mg daily + spironolactone 25 mg daily  -monitor I/Os    Selin Diaz MD  GI Fellow, PGY-4  Available via Microsoft Teams    NON-URGENT CONSULTS:  Please email [email or web address]  AT NIGHT AND ON WEEKENDS:  Contact on-call GI fellow via answering service ([phone]) from 5pm-8am and on weekends/holidays  MONDAY-FRIDAY 8AM-5PM:  Pager# [phone] ([facility]) or [phone] ([facility])  GI Phone# [phone] ([facility])

## 2022-01-05 NOTE — DISCHARGE NOTE PROVIDER - PROVIDER TOKENS
FREE:[LAST:[allan],FIRST:[huey],PHONE:[(   )    -],FAX:[(   )    -],ADDRESS:[[phone]]] PROVIDER:[TOKEN:[[id number]],FOLLOWUP:[2 weeks]],FREE:[LAST:[lemus],FIRST:[asaalissa],PHONE:[(   )    -],FAX:[(   )    -],ADDRESS:[[phone]],FOLLOWUP:[1 week]] PROVIDER:[TOKEN:[[id number]],FOLLOWUP:[1 week]],FREE:[LAST:[lemus],FIRST:[asaalissa],PHONE:[(   )    -],FAX:[(   )    -],ADDRESS:[[phone]],FOLLOWUP:[1 week]]

## 2022-01-06 NOTE — PROGRESS NOTE ADULT - ASSESSMENT
76y Female with pmhx of Hep C cirrhosis, HCC s/p SIRT 11/4/2020, HTN, complaining of RUQ abdominal pain for the last 3 weeks.    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 She reports taking medication previously, but does not recall the exact treatment. Referred to liver clinic 08/2020- w/u showed fibroscan - 19.9 kPa suggestive of cirrhosis, , MRI and US w/ 7.5 cm lesion in R hepatic lob seg 6/7 c/w HCC (LI-RADS 5). No e/o met disease on bone scans EGD 9/30/2020- Small (< 5 mm) EV, GAVE w/o bleeding, gastric polyps s/p bx, nodule in duodenum s/p bx. C-scope 9/30/2020- Internal hemorrhoids, no specimens collected. s/p IR random liver bx 10/2020 with HVPG 11 mmHg and path w/ chronic HCV and cirrhosis mild steatohepatitis. s/p IR R lobe SIRT 11/2020. F/u MR abd 02/2021 showing decreased areas of internal enhancement c/w SIRT tx. Was lost to f/u a few months after SIRT. 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 Per outpt IR note from Dr. Ramirez, Given size of lesions and that she is not a surgical candidate, recommending radioembolization as palliative treatment.    -Ascites: large volume ascites on CT A/P this admission; s/p para 1/3 with 3.6 L removed  -Varices: EGD 9/30/2020- Small (< 5 mm) EV  -HE: none on exam    #c/f bleeding from HCC lesion- found to have Hgb drop 13 -> 7.5 in 2 months; s/p 1 u pRBC; Hgb stable at ~8 (BL 13); pending CTA A/P and possible IR embolization 1/7    Recommendations:  -urgent CTA A/P to eval for bleeding  -NPO after midnight tonight for possible IR procedure tomorrow  -trend CBC, CMP, INR  -appreciate IR, palliative care and oncology recs  -for abdominal distension can start lasix 20 mg daily + spironolactone 25 mg daily  -monitor I/Os    Selin Diaz MD  GI Fellow, PGY-4  Available via [employer] Teams    NON-URGENT CONSULTS:  Please email [email or web address]  AT NIGHT AND ON WEEKENDS:  Contact on-call GI fellow via answering service ([phone]) from 5pm-8am and on weekends/holidays  MONDAY-FRIDAY 8AM-5PM:  Pager# [phone] ([facility]) or [phone] ([facility])  GI Phone# [phone] ([facility])

## 2022-01-06 NOTE — PROGRESS NOTE ADULT - ASSESSMENT
76y Female with pmhx of Hep C cirrhosis, HCC s/p IR embolization, HTN, complaining of RUQ abdominal pain for the last 3 weeks.       IR re consult fro Embolization   Ascites Malignancy related GI/ Hetology following    IR following s/p paracentesis   Advance diet as tolerated   Chemoembolization vs Band Embolization   HCC Hepatology following    s/ p EGD       Anemia in the setting of Cirrhosis and HCC- Transfuse prn.   Anemic profile.

## 2022-01-06 NOTE — PROGRESS NOTE ADULT - SUBJECTIVE AND OBJECTIVE BOX
Overnight events noted      VITAL:  T(C): , Max: 37.2 (01-05-22 @ 14:16)  T(F): , Max: 98.9 (01-05-22 @ 14:16)  HR: 85 (01-06-22 @ 04:45)  BP: 106/70 (01-06-22 @ 04:45)  BP(mean): --  RR: 18 (01-06-22 @ 04:45)  SpO2: 95% (01-06-22 @ 04:45)      PHYSICAL EXAM:  Constitutional: NAD, Alert  HEENT: NCAT, MMM  Neck: Supple, No JVD  Respiratory: CTA-b/l  Cardiovascular: RRR s1s2, no m/r/g  Gastrointestinal: BS+, soft, NT, (+)mild distension  Extremities: No peripheral edema b/l  Neurological: no focal deficits; strength grossly intact  Back: no CVAT b/l  Skin: No rashes, no nevi    LABS:                        8.3    2.20  )-----------( 100      ( 05 Jan 2022 10:36 )             25.8     Na(138)/K(3.2)/Cl(106)/HCO3(21)/BUN(18)/Cr(0.89)Glu(94)/Ca(7.9)/Mg(--)/PO4(--)    01-05 @ 10:36  Na(136)/K(4.2)/Cl(106)/HCO3(20)/BUN(25)/Cr(1.05)Glu(78)/Ca(7.8)/Mg(--)/PO4(--)    01-04 @ 07:34  Na(--)/K(--)/Cl(--)/HCO3(--)/BUN(--)/Cr(--)Glu(--)/Ca(--)/Mg(2.3)/PO4(2.4)    01-04 @ 07:23      IMPRESSION: 76F w/ HTN, HCV, cirrhosis, and HCC, 1/1/22 p/w abdominal pain    (1)TANNER - prerenal; resolved  (2)Hypokalemia - not repleted yesterday - we can replete today  (3)Hepatology - cirrhosis   (4)Hepatocellular CA - IR on board/input appreciated. Considering chemoembolization vs bland embolization for palliation      RECOMMEND:  (1)KCL 20meq po x 2  (2)Dose new meds for GFR 60ml/min  (3)F/U IR input            Ganesh Guidry MD  [facility]  Office: [phone]  Cell: [phone]       Overnight events noted      VITAL:  T(C): , Max: 37.2 (01-05-22 @ 14:16)  T(F): , Max: 98.9 (01-05-22 @ 14:16)  HR: 85 (01-06-22 @ 04:45)  BP: 106/70 (01-06-22 @ 04:45)  BP(mean): --  RR: 18 (01-06-22 @ 04:45)  SpO2: 95% (01-06-22 @ 04:45)      PHYSICAL EXAM:  Constitutional: NAD, Alert  HEENT: NCAT, MMM  Neck: Supple, No JVD  Respiratory: CTA-b/l  Cardiovascular: RRR s1s2, no m/r/g  Gastrointestinal: BS+, soft, NT, (+)mild distension  Extremities: No peripheral edema b/l  Neurological: no focal deficits; strength grossly intact  Back: no CVAT b/l  Skin: No rashes, no nevi    LABS:                        8.3    2.20  )-----------( 100      ( 05 Jan 2022 10:36 )             25.8     Na(138)/K(3.2)/Cl(106)/HCO3(21)/BUN(18)/Cr(0.89)Glu(94)/Ca(7.9)/Mg(--)/PO4(--)    01-05 @ 10:36  Na(136)/K(4.2)/Cl(106)/HCO3(20)/BUN(25)/Cr(1.05)Glu(78)/Ca(7.8)/Mg(--)/PO4(--)    01-04 @ 07:34  Na(--)/K(--)/Cl(--)/HCO3(--)/BUN(--)/Cr(--)Glu(--)/Ca(--)/Mg(2.3)/PO4(2.4)    01-04 @ 07:23      IMPRESSION: 76F w/ HTN, HCV, cirrhosis, and HCC, 1/1/22 p/w abdominal pain    (1)TANNER - prerenal; resolved  (2)Hypokalemia - not repleted yesterday - we can replete today  (3)Hepatology - cirrhosis   (4)Hepatocellular CA - IR on board/input appreciated. Considering chemoembolization vs bland embolization for palliation      RECOMMEND:  (1)KCL 10meq iv x 2  (2)Dose new meds for GFR 60ml/min  (3)F/U IR input            Ganesh Guidry MD  [facility]  Office: [phone]  Cell: [phone]       Overnight events noted      VITAL:  T(C): , Max: 37.2 (01-05-22 @ 14:16)  T(F): , Max: 98.9 (01-05-22 @ 14:16)  HR: 85 (01-06-22 @ 04:45)  BP: 106/70 (01-06-22 @ 04:45)  BP(mean): --  RR: 18 (01-06-22 @ 04:45)  SpO2: 95% (01-06-22 @ 04:45)      PHYSICAL EXAM:  Constitutional: NAD, Alert  HEENT: NCAT, MMM  Neck: Supple, No JVD  Respiratory: CTA-b/l  Cardiovascular: RRR s1s2, no m/r/g  Gastrointestinal: BS+, soft, NT, (+)mild distension  Extremities: No peripheral edema b/l  Neurological: no focal deficits; strength grossly intact  Back: no CVAT b/l  Skin: No rashes, no nevi    LABS:                        8.3    2.20  )-----------( 100      ( 05 Jan 2022 10:36 )             25.8     Na(138)/K(3.2)/Cl(106)/HCO3(21)/BUN(18)/Cr(0.89)Glu(94)/Ca(7.9)/Mg(--)/PO4(--)    01-05 @ 10:36  Na(136)/K(4.2)/Cl(106)/HCO3(20)/BUN(25)/Cr(1.05)Glu(78)/Ca(7.8)/Mg(--)/PO4(--)    01-04 @ 07:34  Na(--)/K(--)/Cl(--)/HCO3(--)/BUN(--)/Cr(--)Glu(--)/Ca(--)/Mg(2.3)/PO4(2.4)    01-04 @ 07:23      IMPRESSION: 76F w/ HTN, HCV, cirrhosis, and HCC, 1/1/22 p/w abdominal pain    (1)TANNER - prerenal; resolved  (2)Hypokalemia - not repleted yesterday - we can replete today  (3)Hepatology - cirrhosis   (4)Hepatocellular CA - IR on board/input appreciated. Considering chemoembolization vs bland embolization for palliation      RECOMMEND:  (1)KCL 10meq iv x 2  (2)Dose new meds for GFR 60ml/min  (3)No renal objection to proceeding with embolization             Ganesh Guidry MD  [facility]  Office: [phone]  Cell: [phone]

## 2022-01-06 NOTE — PROGRESS NOTE ADULT - SUBJECTIVE AND OBJECTIVE BOX
Chief Complaint:  Patient is a 76y old  Female who presents with a chief complaint of RUQ abdominal pain (06 Jan 2022 10:15)      Interval Events:   Reports feeling well but continues to have abd discomfort and bloating.   Denies any N/V/D/C, melena or hematochezia.  C/f possible bleeding from around HCC site, pending CTA A/P to eval for bleeding    Hospital Medications:  influenza  Vaccine (HIGH DOSE) 0.7 milliLiter(s) IntraMuscular once  ondansetron Injectable 4 milliGRAM(s) IV Push once PRN  potassium chloride  10 mEq/100 mL IVPB 10 milliEquivalent(s) IV Intermittent every 1 hour  sodium chloride 0.9%. 500 milliLiter(s) IV Continuous <Continuous>      PMHX/PSHX:  Hypertension, unspecified type    Thrombocytopenia    Hepatic cirrhosis, unspecified hepatic cirrhosis type, unspecified whether ascites present    Vaginal cyst            ROS: 14 point ROS negative unless otherwise stated in subjective      PHYSICAL EXAM:     GENERAL:  Appears stated age, well-groomed, well-nourished, no distress  HEENT:  NC/AT,  conjunctivae clear and pink  CHEST:  Full & symmetric excursion, no increased effort, breath sounds clear  HEART:  Regular rhythm, S1, S2, no murmur/rub/S3/S4  ABDOMEN:  Soft, non-tender, +mildly-moderately distended, normoactive bowel sounds,    EXTREMITIES:  no cyanosis, clubbing or edema  SKIN:  No rash/erythema/ecchymoses/petechiae/wounds/abscess/warm/dry  NEURO:  Alert, orientedx3, no asterixis    Vital Signs:  Vital Signs Last 24 Hrs  T(C): 37 (06 Jan 2022 09:32), Max: 37.2 (05 Jan 2022 14:16)  T(F): 98.6 (06 Jan 2022 09:32), Max: 98.9 (05 Jan 2022 14:16)  HR: 92 (06 Jan 2022 09:32) (62 - 92)  BP: 124/74 (06 Jan 2022 09:32) (106/70 - 124/77)  BP(mean): --  RR: 18 (06 Jan 2022 09:32) (18 - 18)  SpO2: 98% (06 Jan 2022 09:32) (95% - 99%)  Daily     Daily     LABS:                        8.3    2.20  )-----------( 100      ( 05 Jan 2022 10:36 )             25.8     01-05    138  |  106  |  18  ----------------------------<  94  3.2<L>   |  21<L>  |  0.89    Ca    7.9<L>      05 Jan 2022 10:36    TPro  7.2  /  Alb  3.0<L>  /  TBili  3.1<H>  /  DBili  x   /  AST  312<H>  /  ALT  101<H>  /  AlkPhos  87  01-05    LIVER FUNCTIONS - ( 05 Jan 2022 10:36 )  Alb: 3.0 g/dL / Pro: 7.2 g/dL / ALK PHOS: 87 U/L / ALT: 101 U/L / AST: 312 U/L / GGT: x                   Imaging: No new abdominal imaging

## 2022-01-06 NOTE — PROGRESS NOTE ADULT - PROBLEM SELECTOR PLAN 3
ongoing GOC. discussed molst and code status today. patient not ready to make decisions. goc narrative above  HCP completed on initial visit and copy in chart

## 2022-01-06 NOTE — PROGRESS NOTE ADULT - CONVERSATION DETAILS
Discussed with patient about code status. States she had completed living will in the past but never thought about current wishes.  We discussed CPR and intubation and patient requested time to consider these options. She was appreciative of the information and reiterated her close relationship with her niece and nephew that should be contacted if any emergency. She understands the default in the hospital is "full code".

## 2022-01-06 NOTE — PROGRESS NOTE ADULT - PROBLEM SELECTOR PLAN 1
start oxy 5 q6h prn  bowel regimen  pain is incidental with movement, no complaints at time of visit today

## 2022-01-06 NOTE — PROGRESS NOTE ADULT - SUBJECTIVE AND OBJECTIVE BOX
Patient is a 76y old  Female who presents with a chief complaint of     SUBJECTIVE / OVERNIGHT EVENTS: no events over night     T(C): 36.4 (01-06-22 @ 16:58), Max: 37.2 (01-06-22 @ 14:29)  HR: 95 (01-06-22 @ 16:58) (89 - 95)  BP: 124/76 (01-06-22 @ 16:58) (115/76 - 124/76)  RR: 18 (01-06-22 @ 16:58) (18 - 18)  SpO2: 99% (01-06-22 @ 16:58) (98% - 100%)      MEDICATIONS  (STANDING):  furosemide    Tablet 20 milliGRAM(s) Oral daily  influenza  Vaccine (HIGH DOSE) 0.7 milliLiter(s) IntraMuscular once  potassium chloride  10 mEq/100 mL IVPB 10 milliEquivalent(s) IV Intermittent every 1 hour  sodium chloride 0.9%. 500 milliLiter(s) (30 mL/Hr) IV Continuous <Continuous>  spironolactone 25 milliGRAM(s) Oral daily    MEDICATIONS  (PRN):  ondansetron Injectable 4 milliGRAM(s) IV Push once PRN Nausea and/or Vomiting  oxyCODONE    IR 5 milliGRAM(s) Oral every 6 hours PRN Severe Pain (7 - 10)                                              8.3    2.20  )-----------( 100      ( 05 Jan 2022 10:36 )             25.8                           8.3    2.20  )-----------( 100      ( 05 Jan 2022 10:36 )             25.8         PHYSICAL EXAM:  GENERAL: NAD, well-developed  HEAD:  Atraumatic, Normocephalic  EYES: EOMI,  conjunctiva and sclera clear  NECK: Supple, No JVD  CHEST/LUNG: Clear to auscultation bilaterally; No wheeze  HEART: Regular rate and rhythm; No murmurs, rubs, or gallops  ABDOMEN: Soft, Nontender, Nondistended; Bowel sounds present  EXTREMITIES:  2+ Peripheral Pulses, No clubbing, cyanosis, or edema  PSYCH: AAOx3  NEUROLOGY: non-focal  SKIN: No rashes or lesions                                  8.3    2.20  )-----------( 100      ( 05 Jan 2022 10:36 )             25.8           LIVER FUNCTIONS - ( 05 Jan 2022 10:36 )  Alb: 3.0 g/dL / Pro: 7.2 g/dL / ALK PHOS: 87 U/L / ALT: 101 U/L / AST: 312 U/L / GGT: x                 RADIOLOGY & ADDITIONAL TESTS:    Imaging Personally Reviewed:    Consultant(s) Notes Reviewed:      Care Discussed with Consultants/Other Providers:

## 2022-01-06 NOTE — PROGRESS NOTE ADULT - SUBJECTIVE AND OBJECTIVE BOX
SUBJECTIVE AND OBJECTIVE: Patient seen and examined, has had some intermittent pain with positional movement in RUQ but otherwise no complaints other than bloating. States she is pending IR procedure tomorrow.    INTERVAL HPI/OVERNIGHT EVENTS: no acute overnight events, pending abdominal imaging    DNR on chart:   Allergies    No Known Allergies    Intolerances    MEDICATIONS  (STANDING):  influenza  Vaccine (HIGH DOSE) 0.7 milliLiter(s) IntraMuscular once  sodium chloride 0.9%. 500 milliLiter(s) (30 mL/Hr) IV Continuous <Continuous>    MEDICATIONS  (PRN):  ondansetron Injectable 4 milliGRAM(s) IV Push once PRN Nausea and/or Vomiting  oxyCODONE    IR 5 milliGRAM(s) Oral every 6 hours PRN Severe Pain (7 - 10)      ITEMS UNCHECKED ARE NOT PRESENT    PRESENT SYMPTOMS: [ ]Unable to obtain due to poor mentation   Source if other than patient:  [ ]Family   [ ]Team     Pain:  [ ]yes [ x]no- not at time of eval but intermittent with position change  QOL impact -   Location -        RUQ            Aggravating factors - movement  Quality -aching  Radiation -  Timing- incidental with movement  Severity (0-10 scale): 5  Minimal acceptable level (0-10 scale):     Dyspnea:                           [ ]Mild [ ]Moderate [ ]Severe  Anxiety:                             [ ]Mild [ ]Moderate [ ]Severe  Fatigue:                             [x ]Mild [ ]Moderate [ ]Severe  Nausea:                             [ ]Mild [ ]Moderate [ ]Severe  Loss of appetite:              [ ]Mild [ ]Moderate [ ]Severe  Constipation:                    [ ]Mild [ ]Moderate [ ]Severe    CPOT:    https://www.[facility].org/getattachment/reu43c50-6v3h-9p0w-2s6c-6757d7691o4k/Critical-Care-Pain-Observation-Tool-(CPOT)    PAIN AD Score:	  http://geriatrictoolkit.[place].[facility]/cog/painad.pdf (Ctrl + left click to view)    Other Symptoms:  [x ]All other review of systems negative     Palliative Performance Status Version 2:     50    %      http://npcrc.org/files/news/palliative_performance_scale_ppsv2.pdf  PHYSICAL EXAM:  Vital Signs Last 24 Hrs  T(C): 37.2 (06 Jan 2022 14:29), Max: 37.2 (06 Jan 2022 14:29)  T(F): 98.9 (06 Jan 2022 14:29), Max: 98.9 (06 Jan 2022 14:29)  HR: 89 (06 Jan 2022 14:29) (62 - 92)  BP: 115/76 (06 Jan 2022 14:29) (106/70 - 124/74)  BP(mean): --  RR: 18 (06 Jan 2022 14:29) (18 - 18)  SpO2: 100% (06 Jan 2022 14:29) (95% - 100%) I&O's Summary    05 Jan 2022 07:01  -  06 Jan 2022 07:00  --------------------------------------------------------  IN: 860 mL / OUT: 0 mL / NET: 860 mL       GENERAL:  [x ]Alert  [x ]Oriented x3   [ ]Lethargic  [ ]Cachexia  [ ]Unarousable  [ ]Verbal  [ ]Non-Verbal  Behavioral:   [ ]Anxiety  [ ]Delirium [ ]Agitation [ ]Other  HEENT:  [x ]Normal   [ ]Dry mouth   [ ]ET Tube/Trach  [ ]Oral lesions  PULMONARY:   [x ]Clear [ ]Tachypnea  [ ]Audible excessive secretions   [ ]Rhonchi        [ ]Right [ ]Left [ ]Bilateral  [ ]Crackles        [ ]Right [ ]Left [ ]Bilateral  [ ]Wheezing     [ ]Right [ ]Left [ ]Bilateral  [ ]Diminished BS [ ] Right [ ]Left [ ]Bilateral  CARDIOVASCULAR:    [ x]Regular [ ]Irregular [ ]Tachy  [ ]Marin [ ]Murmur [ ]Other  GASTROINTESTINAL:  [ ]Soft  [x ]Distended   [ x]+BS  [x ]Non tender [ ]Tender  [ ]PEG [ ]OGT/ NGT   Last BM:    GENITOURINARY:  [x]Normal [ ]Incontinent   [ ]Oliguria/Anuria   [ ]Calle  MUSCULOSKELETAL:   [ ]Normal   [ x]Weakness  [ ]Bed/Wheelchair bound [ ]Edema  NEUROLOGIC:   [ x]No focal deficits  [ ] Cognitive impairment  [ ] Dysphagia [ ]Dysarthria [ ] Paresis [ ]Other   SKIN:   [ ]Normal  [ ]Rash   [ ]Pressure ulcer(s) [ ]y [ ]n present on admission    CRITICAL CARE:  [ ]Shock Present  [ ]Septic [ ]Cardiogenic [ ]Neurologic [ ]Hypovolemic  [ ]Vasopressors [ ]Inotropes  [ ]Respiratory failure present [ ]Mechanical Ventilation [ ]Non-invasive ventilatory support [ ]High-Flow   [ ]Acute  [ ]Chronic [ ]Hypoxic  [ ]Hypercarbic [ ]Other  [ ]Other organ failure     LABS:                        8.3    2.20  )-----------( 100      ( 05 Jan 2022 10:36 )             25.8   01-05    138  |  106  |  18  ----------------------------<  94  3.2<L>   |  21<L>  |  0.89    Ca    7.9<L>      05 Jan 2022 10:36    TPro  7.2  /  Alb  3.0<L>  /  TBili  3.1<H>  /  DBili  x   /  AST  312<H>  /  ALT  101<H>  /  AlkPhos  87  01-05        RADIOLOGY & ADDITIONAL STUDIES: abdominal imaging pending for today.    Protein Calorie Malnutrition Present: [ ]mild [ ]moderate [ ]severe [ ]underweight [ ]morbid obesity  https://www.andeal.org/vault/2440/web/files/ONC/Table_Clinical%20Characteristics%20to%20Document%20Malnutrition-White%20JV%20et%20al%202012.pdf    Height (cm): 165.1 (01-04-22 @ 10:19)  Weight (kg): 63.5 (01-04-22 @ 10:19), 66.8686 (11-12-21 @ 09:00)  BMI (kg/m2): 23.3 (01-04-22 @ 10:19)    [ ]PPSV2 < or = 30%  [ ]significant weight loss [ ]poor nutritional intake [ ]anasarca    [ ]Artificial Nutrition    REFERRALS:   [ ]Chaplaincy  [ ]Hospice  [ ]Child Life  [x ]Social Work  [x ]Case management [ ]Holistic Therapy     Goals of Care Document:

## 2022-01-06 NOTE — PROGRESS NOTE ADULT - SUBJECTIVE AND OBJECTIVE BOX
Hematology Oncology Follow-up    INTERVAL HPI/OVERNIGHT EVENTS:  The patient is on the phone with her sister this morning. She reports that she still has mild abdominal discomfort when she moves, but otherwise denies pain. She also denies any N/V and has been ambulating occasionally within her room. She reports that she may have a procedure today with Dr. Estevez.       REVIEW OF SYSTEMS  CONSTITUTIONAL: No fever, no chills, no fatigue  EYES: No eye pain, no vision changes  ENMT:  No difficulty hearing, no throat pain  RESPIRATORY: No cough, no dyspnea  CARDIOVASCULAR: No chest pain, no leg swelling  GASTROINTESTINAL: mild abdominal pain with movement, no nausea, no vomiting,   GENITOURINARY: No dysuria, no hematuria  NEUROLOGICAL: No headaches, no loss of strength, no numbness  SKIN: No itching, no rashes, no lesions   MUSCULOSKELETAL: No joint pain, no joint swelling; No muscle pain  HEME/LYMPH: No easy bruising, bleeding    VITAL SIGNS:  T(F): 97.3 (01-06-22 @ 04:45)  HR: 85 (01-06-22 @ 04:45)  BP: 106/70 (01-06-22 @ 04:45)  RR: 18 (01-06-22 @ 04:45)  SpO2: 95% (01-06-22 @ 04:45)  Wt(kg): --    01-05-22 @ 07:01  -  01-06-22 @ 07:00  --------------------------------------------------------  IN: 860 mL / OUT: 0 mL / NET: 860 mL        T(C): 36.3 (01-06-22 @ 04:45), Max: 37.2 (01-05-22 @ 14:16)  HR: 85 (01-06-22 @ 04:45) (62 - 91)  BP: 106/70 (01-06-22 @ 04:45) (106/70 - 124/77)  RR: 18 (01-06-22 @ 04:45) (18 - 18)  SpO2: 95% (01-06-22 @ 04:45) (95% - 99%)  Wt(kg): --    PHYSICAL EXAM:  GENERAL: NAD, well-groomed, well-developed  HEAD:  Atraumatic, Normocephalic  EYES: EOMI, PERRLA, conjunctiva and sclera clear  ENMT: No oropharyngeal exudates, erythema or lesions,  Moist mucous membranes  NECK: Supple, no cervical lymphadenopathy, no JVD  NERVOUS SYSTEM:  Alert & Oriented X3, CN II-XII intact, 5/5 BUE and BLE motor strength, full sensation to light touch   CHEST/LUNG: Clear to auscultation bilaterally; no rhonchi  HEART: Regular rate and rhythm; No murmurs  ABDOMEN: Soft, hepatomegaly, Nondistended  EXTREMITIES:  2+ radial Pulses, No cyanosis or edema  SKIN: warm, dry      MEDICATIONS  (STANDING):  influenza  Vaccine (HIGH DOSE) 0.7 milliLiter(s) IntraMuscular once  potassium chloride  10 mEq/100 mL IVPB 10 milliEquivalent(s) IV Intermittent every 1 hour  sodium chloride 0.9%. 500 milliLiter(s) (30 mL/Hr) IV Continuous <Continuous>    MEDICATIONS  (PRN):  ondansetron Injectable 4 milliGRAM(s) IV Push once PRN Nausea and/or Vomiting      No Known Allergies      LABS:                        8.3    2.20  )-----------( 100      ( 05 Jan 2022 10:36 )             25.8     01-05    138  |  106  |  18  ----------------------------<  94  3.2<L>   |  21<L>  |  0.89    Ca    7.9<L>      05 Jan 2022 10:36    TPro  7.2  /  Alb  3.0<L>  /  TBili  3.1<H>  /  DBili  x   /  AST  312<H>  /  ALT  101<H>  /  AlkPhos  87  01-05                   RADIOLOGY & ADDITIONAL TESTS:  Studies reviewed.

## 2022-01-06 NOTE — PROGRESS NOTE ADULT - PROBLEM SELECTOR PLAN 2
pending abdominal imaging  IR procedure now planned for tomorrow, Friday  will need heme/onc discussion re: options for tx

## 2022-01-06 NOTE — PROGRESS NOTE ADULT - ASSESSMENT
This patient is a 75yo female with PMH of Hep C with cirrhosis, HCC s/p IR embolization with recent progression, HTN, who presented with SOB and worsened abdominal pain, now s/p IR paracentesis, also noted to have anemia.     #Anemia:  - peripheral smear was reviewed on 1/2/2022 without signs of hemolysis.  Reticulocyte index is low at 1.16, suggesting hypoproliferation.   EGD found no evidence of varices but found sessile polyps and portal hypertensive gastropathy. No active bleeding noted.   - Hgb still stable.  - Continue to monitor Hgb.     #HCC and HepC cirrhosis   This patient has an elevated Child Fisher score of 10, if we consider that she had ascites which required drainage. Tbili still elevated at 3.1.  Will discuss with Dr. Carreno regarding plan for IR embolization    #TANNER: resolving    We will follow with you. Please do not hesitate to page with questions.     Mary Butler MD PGY4   [phone]  Hematology-Oncology Fellow   This patient is a 75yo female with PMH of Hep C with cirrhosis, HCC s/p IR embolization with recent progression, HTN, who presented with SOB and worsened abdominal pain, now s/p IR paracentesis, also noted to have anemia.     #Anemia:  - peripheral smear was reviewed on 1/2/2022 without signs of hemolysis.  Reticulocyte index is low at 1.16, suggesting hypoproliferation.   EGD found no evidence of varices but found sessile polyps and portal hypertensive gastropathy. No active bleeding noted.   - Hgb still stable.  - Continue to monitor Hgb.     #HCC and HepC cirrhosis   This patient has an elevated Child Fisher score of 10, if we consider that she had ascites which required drainage. Tbili still elevated at 3.1.  Plan of care was discussed with Dr. Carreno. At this point, there is no plan for systemic therapy. The patient is planned for possible selective embolization by IR as drop in Hgb may be due to bleeding from one of the liver tumors.     #TANNER: resolving    We will follow with you. Please do not hesitate to page with questions.     Mary Butler MD PGY4   [phone]  Hematology-Oncology Fellow   This patient is a 77yo female with PMH of Hep C with cirrhosis, HCC s/p IR embolization with recent progression, HTN, who presented with SOB and worsened abdominal pain, now s/p IR paracentesis, also noted to have anemia.     #Anemia:  - peripheral smear was reviewed on 1/2/2022 without signs of hemolysis.  Reticulocyte index is low at 1.16, suggesting hypoproliferation.   EGD found no evidence of varices but found sessile polyps and portal hypertensive gastropathy. No active bleeding noted.   - Hgb still stable.  - Continue to monitor Hgb.     #HCC and HepC cirrhosis   This patient has an elevated Child Fisher score of 10, if we consider that she had ascites which required drainage. Tbili still elevated at 3.1.  Plan of care was discussed with Dr. Carreno. At this point, there is no plan for systemic therapy.   Update- CT A/P with IVC performed on 1/6- preliminary report found large confluent masses of partially necrotic HCC in R liver lobe with involvement of liver capsule at segment 7 and 8. In some areas, there is concern for disruption of the masses with necrotic tumor material in segment 7 and 8. No active contrast extravasate in arterial or venous phases to suggest active bleeding. Large amount of ascites w/ simple fluid attenuation. Tumor infiltration in the left portal vein branch and main portal vein.  Follow up final CT A/P read.   Pending d/w IR regarding possible selective embolization.     #TANNER: resolving    We will follow with you. Please do not hesitate to page with questions.     Mary Butler MD PGY4   [phone]  Hematology-Oncology Fellow

## 2022-01-07 NOTE — PROGRESS NOTE ADULT - ASSESSMENT
This patient is a 75yo female with PMH of Hep C with cirrhosis, HCC s/p IR embolization with recent progression, HTN, who presented with SOB and worsened abdominal pain, now s/p IR paracentesis, also noted to have anemia.     #Anemia:  Patient received 1 u pRBC on 1/1/2022.   - Peripheral smear was reviewed on 1/2/2022 without signs of hemolysis.  Reticulocyte index is low at 1.16, suggesting hypoproliferation.   EGD on 1/4/2022 found no evidence of varices but found sessile polyps and portal hypertensive gastropathy. No active bleeding noted.   CTA of A/P on 1/6/2022 did not identify active bleeding.ge  - The patient had drop in Hgb and is receiving 1u PRBC today.    - Check post-transfusion CBC     #HCC and HepC cirrhosis   This patient has an elevated Child Fisher score of 10, if we consider that she had ascites which required drainage.   The patient's case was discussed at tumor board on 1/6. Per IR, the patient is planned for angiogram today with Dr. Ramirez.    #TANNER: resolving    We will follow with you. Please do not hesitate to page with questions.     Mary Butler MD PGY4   [phone]  Hematology-Oncology Fellow

## 2022-01-07 NOTE — PROGRESS NOTE ADULT - ASSESSMENT
76y Female with pmhx of Hep C cirrhosis, HCC s/p IR embolization, HTN, complaining of RUQ abdominal pain for the last 3 weeks.       IR re consult fro Embolization   Ascites Malignancy related GI/ Hetology following    IR following s/p paracentesis   Advance diet as tolerated   Chemoembolization TODAY   HCC Hepatology following    s/ p EGD       Anemia in the setting of Cirrhosis and HCC- Transfuse prn.   Monitor

## 2022-01-07 NOTE — PROCEDURE NOTE - PROCEDURE FINDINGS AND DETAILS
- R CFA access.  - Celiac angiogram demonstrated large tumor burden within the liver and partially patent portal vein secondary to known tumor in vein.  - Distal right hepatic angiogram again demonstrated large tumor burden within the right hepatic lobe supplying the region of tumor progression  - Tc99 MAA injected into the proximal right hepatic artery.  - Hemostasis with angioseal  - Patient transferred to nuclear medicine for lung shunt study
LLQ paracentesis performed. 3.6L fluid removed. Cyto, culture, and chemistry sent. Given albumin x3. Patient tolerated the procedure well.

## 2022-01-07 NOTE — PROGRESS NOTE ADULT - ASSESSMENT
76y Female with pmhx of Hep C cirrhosis, HCC s/p SIRT 11/4/2020, HTN, complaining of RUQ abdominal pain for the last 3 weeks.    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 She reports taking medication previously, but does not recall the exact treatment. Referred to liver clinic 08/2020- w/u showed fibroscan - 19.9 kPa suggestive of cirrhosis, , MRI and US w/ 7.5 cm lesion in R hepatic lob seg 6/7 c/w HCC (LI-RADS 5). No e/o met disease on bone scans EGD 9/30/2020- Small (< 5 mm) EV, GAVE w/o bleeding, gastric polyps s/p bx, nodule in duodenum s/p bx. C-scope 9/30/2020- Internal hemorrhoids, no specimens collected. s/p IR random liver bx 10/2020 with HVPG 11 mmHg and path w/ chronic HCV and cirrhosis mild steatohepatitis. s/p IR R lobe SIRT 11/2020. F/u MR abd 02/2021 showing decreased areas of internal enhancement c/w SIRT tx. Was lost to f/u a few months after SIRT. 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 Per outpt IR note from Dr. Ramirez, Given size of lesions and that she is not a surgical candidate, recommending radioembolization as palliative treatment.    -Ascites: large volume ascites on CT A/P this admission; s/p para 1/3 with 3.6 L removed  -Varices: EGD 9/30/2020- Small (< 5 mm) EV  -HE: none on exam    #c/f bleeding from HCC lesion- found to have Hgb drop 13 -> 7.5 in 2 months; s/p 1 u pRBC; Hgb stable at ~8 (BL 13); pending CTA A/P and possible IR embolization 1/7    Recommendations:  -trend CBC, CMP, INR  -appreciate IR, palliative care and oncology recs  -for abdominal distension can start lasix 20 mg daily + spironolactone 25 mg daily  -monitor I/Os    Selin Diaz MD  GI Fellow, PGY-4  Available via Microsoft Teams    NON-URGENT CONSULTS:  Please email [email or web address]  AT NIGHT AND ON WEEKENDS:  Contact on-call GI fellow via answering service ([phone]) from 5pm-8am and on weekends/holidays  MONDAY-FRIDAY 8AM-5PM:  Pager# [phone] ([facility]) or [phone] ([facility])  GI Phone# [phone] ([facility])       76y Female with pmhx of Hep C cirrhosis, HCC s/p SIRT 11/4/2020, HTN, complaining of RUQ abdominal pain for the last 3 weeks.    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 She reports taking medication previously, but does not recall the exact treatment. Referred to liver clinic 08/2020- w/u showed fibroscan - 19.9 kPa suggestive of cirrhosis, , MRI and US w/ 7.5 cm lesion in R hepatic lob seg 6/7 c/w HCC (LI-RADS 5). No e/o met disease on bone scans EGD 9/30/2020- Small (< 5 mm) EV, GAVE w/o bleeding, gastric polyps s/p bx, nodule in duodenum s/p bx. C-scope 9/30/2020- Internal hemorrhoids, no specimens collected. s/p IR random liver bx 10/2020 with HVPG 11 mmHg and path w/ chronic HCV and cirrhosis mild steatohepatitis. s/p IR R lobe SIRT 11/2020. F/u MR abd 02/2021 showing decreased areas of internal enhancement c/w SIRT tx. Was lost to f/u a few months after SIRT. 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 Per outpt IR note from Dr. Ramirez, Given size of lesions and that she is not a surgical candidate, recommending radioembolization as palliative treatment.    -Ascites: large volume ascites on CT A/P this admission; s/p para 1/3 with 3.6 L removed  -Varices: EGD 9/30/2020- Small (< 5 mm) EV  -HE: none on exam    #c/f bleeding from HCC lesion- found to have Hgb drop 13 -> 7.5 in 2 months; s/p 1 u pRBC; Hgb stable at ~8 (BL 13); CTA A/P large confluent masses of partially necrotic HCC in R liver lobe w/ liver capsule involvement in seg 7/8, no active contrast extravasate to suggest active bleeding, liver cirrhosis with portal HTN, additional HCC in L hepatic lobe  -1/7- IR hepatic arteriogram, possible bland embolization, possible y90 Mapping     Recommendations:  -trend CBC, CMP, INR  -appreciate IR, palliative care and oncology recs  -for abdominal distension can start lasix 20 mg daily + spironolactone 25 mg daily  -monitor I/Os  -plan for Y90 next week by INDU Diaz MD  GI Fellow, PGY-4  Available via Microsoft Teams    NON-URGENT CONSULTS:  Please email [email or web address]  AT NIGHT AND ON WEEKENDS:  Contact on-call GI fellow via answering service ([phone]) from 5pm-8am and on weekends/holidays  MONDAY-FRIDAY 8AM-5PM:  Pager# [phone] ([facility]) or [phone] ([facility])  GI Phone# [phone] ([facility])       76y Female with pmhx of Hep C cirrhosis, HCC s/p SIRT 11/4/2020, HTN, complaining of RUQ abdominal pain for the last 3 weeks. s/p EGD 1/4/22- PHG, no varices, mild schatzki ring, gastric polyps, duodenal lipoma    #Decompensated HCV cirrhosis (GT1a) c/b ascites, thrombocytopenia  #R lobe HCC in seg 6/7 (dx 08/2020, s/p R lobe SIRT 11/2020 now with recent disease progression)  Dx HCV in 1990's (exposure thought 2/2 blood transfusion in 1988 s/p MCV or exposure to unsterile tx from injection for pain control). She reports taking medication previously, but does not recall the exact treatment. Referred to liver clinic 08/2020- w/u showed fibroscan - 19.9 kPa suggestive of cirrhosis, , MRI and US w/ 7.5 cm lesion in R hepatic lob seg 6/7 c/w HCC (LI-RADS 5). No e/o met disease on bone scans EGD 9/30/2020- Small (< 5 mm) EV, GAVE w/o bleeding, gastric polyps s/p bx, nodule in duodenum s/p bx. C-scope 9/30/2020- Internal hemorrhoids, no specimens collected. s/p IR random liver bx 10/2020 with HVPG 11 mmHg and path w/ chronic HCV and cirrhosis mild steatohepatitis. s/p IR R lobe SIRT 11/2020. F/u MR abd 02/2021 showing decreased areas of internal enhancement c/w SIRT tx. Was lost to f/u a few months after SIRT. 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 Per outpt IR note from Dr. Ramirez, Given size of lesions and that she is not a surgical candidate, recommending radioembolization as palliative treatment.    -Ascites: large volume ascites on CT A/P this admission; s/p para 1/3 with 3.6 L removed  -Varices: EGD 1/4/22- PHG, no varices  -HE: none on exam    #c/f bleeding from HCC lesion- found to have Hgb drop 13 -> 7.5 in 2 months; s/p 1 u pRBC; Hgb stable at ~8 (BL 13); CTA A/P large confluent masses of partially necrotic HCC in R liver lobe w/ liver capsule involvement in seg 7/8, no active contrast extravasate to suggest active bleeding, liver cirrhosis with portal HTN, additional HCC in L hepatic lobe  -1/7- IR hepatic arteriogram, possible bland embolization, possible y90 Mapping     Recommendations:  -trend CBC, CMP, INR  -appreciate IR, palliative care and oncology recs  -for abdominal distension can start lasix 20 mg daily + spironolactone 25 mg daily  -monitor I/Os  -plan for Y90 next week by INDU Diaz MD  GI Fellow, PGY-4  Available via Microsoft Teams    NON-URGENT CONSULTS:  Please email [email or web address]  AT NIGHT AND ON WEEKENDS:  Contact on-call GI fellow via answering service ([phone]) from 5pm-8am and on weekends/holidays  MONDAY-FRIDAY 8AM-5PM:  Pager# [phone] ([facility]) or [phone] ([facility])  GI Phone# [phone] ([facility])

## 2022-01-07 NOTE — PROGRESS NOTE ADULT - SUBJECTIVE AND OBJECTIVE BOX
Hematology Oncology Follow-up    INTERVAL HPI/OVERNIGHT EVENTS:      REVIEW OF SYSTEMS  CONSTITUTIONAL: No fever, no chills, no fatigue  EYES: No eye pain, no vision changes  ENMT:  No difficulty hearing, no throat pain  RESPIRATORY: No cough, no wheezing,  no sputum production; No shortness of breath  CARDIOVASCULAR: No chest pain, no palpitations, no PARISI, no leg swelling  GASTROINTESTINAL: No abdominal pain, no nausea, no vomiting, no hematemesis, no diarrhea, no constipation,  GENITOURINARY: No dysuria, no hematuria  NEUROLOGICAL: No headaches, no loss of strength, no numbness  SKIN: No itching, no rashes, no lesions   MUSCULOSKELETAL: No joint pain, no joint swelling; No muscle pain  HEME/LYMPH: No easy bruising, bleeding        VITAL SIGNS:  T(F): 98.1 (01-07-22 @ 09:09)  HR: 97 (01-07-22 @ 09:09)  BP: 117/76 (01-07-22 @ 09:09)  RR: 18 (01-07-22 @ 09:09)  SpO2: 98% (01-07-22 @ 09:09)  Wt(kg): --    01-06-22 @ 07:01  -  01-07-22 @ 07:00  --------------------------------------------------------  IN: 480 mL / OUT: 1 mL / NET: 479 mL        T(C): 36.7 (01-07-22 @ 09:09), Max: 37.2 (01-06-22 @ 14:29)  HR: 97 (01-07-22 @ 09:09) (66 - 110)  BP: 117/76 (01-07-22 @ 09:09) (106/69 - 124/76)  RR: 18 (01-07-22 @ 09:09) (18 - 18)  SpO2: 98% (01-07-22 @ 09:09) (98% - 100%)  Wt(kg): --    PHYSICAL EXAM:  GENERAL: NAD, well-groomed, well-developed  HEAD:  Atraumatic, Normocephalic  EYES: EOMI, PERRLA, conjunctiva and sclera clear  ENMT: No oropharyngeal exudates, erythema or lesions,  Moist mucous membranes  NECK: Supple, no cervical lymphadenopathy, no JVD  NERVOUS SYSTEM:  Alert & Oriented X3, CN II-XII intact, 5/5 BUE and BLE motor strength, full sensation to light touch   CHEST/LUNG: Clear to auscultation bilaterally; no rhonchi  HEART: Regular rate and rhythm; No murmurs  ABDOMEN: Soft, Nontender, Nondistended  EXTREMITIES:  2+ radial Pulses, No cyanosis or edema  SKIN: warm, dry      MEDICATIONS  (STANDING):  furosemide    Tablet 20 milliGRAM(s) Oral daily  influenza  Vaccine (HIGH DOSE) 0.7 milliLiter(s) IntraMuscular once  sodium chloride 0.9%. 500 milliLiter(s) (30 mL/Hr) IV Continuous <Continuous>  spironolactone 25 milliGRAM(s) Oral daily    MEDICATIONS  (PRN):  ondansetron Injectable 4 milliGRAM(s) IV Push once PRN Nausea and/or Vomiting  oxyCODONE    IR 5 milliGRAM(s) Oral every 6 hours PRN Severe Pain (7 - 10)      No Known Allergies      LABS:                        7.1    2.82  )-----------( 96       ( 07 Jan 2022 06:05 )             22.1     01-07    143  |  111<H>  |  18  ----------------------------<  82  4.3   |  22  |  0.92    Ca    8.2<L>      07 Jan 2022 06:06  Phos  3.1     01-07  Mg     2.3     01-07    TPro  7.2  /  Alb  3.0<L>  /  TBili  3.1<H>  /  DBili  x   /  AST  312<H>  /  ALT  101<H>  /  AlkPhos  87  01-05    PT/INR - ( 07 Jan 2022 06:04 )   PT: 17.3 sec;   INR: 1.47 ratio         PTT - ( 07 Jan 2022 06:04 )  PTT:32.1 sec               RADIOLOGY & ADDITIONAL TESTS:  Studies reviewed. Hematology Oncology Follow-up    INTERVAL HPI/OVERNIGHT EVENTS:  The patient endorses having sharp RUQ pain when she tries to twist in bed. She denies any nausea or vomiting. She has been NPO since midnight for planned embolization by IR.       REVIEW OF SYSTEMS  CONSTITUTIONAL: No fever, no chills, no fatigue  EYES: No eye pain, no vision changes  ENMT:  No difficulty hearing, no throat pain  RESPIRATORY: No cough, no wheezing,  no sputum production; No shortness of breath  CARDIOVASCULAR: No chest pain, no palpitations, no PARISI, no leg swelling  GASTROINTESTINAL: + abdominal pain, no nausea, no vomiting  GENITOURINARY: No dysuria, no hematuria  NEUROLOGICAL: No headaches, no loss of strength, no numbness  SKIN: No itching, no rashes, no lesions   MUSCULOSKELETAL: No joint pain, no joint swelling; No muscle pain  HEME/LYMPH: No easy bruising, bleeding    VITAL SIGNS:  T(F): 98.1 (01-07-22 @ 09:09)  HR: 97 (01-07-22 @ 09:09)  BP: 117/76 (01-07-22 @ 09:09)  RR: 18 (01-07-22 @ 09:09)  SpO2: 98% (01-07-22 @ 09:09)  Wt(kg): --    01-06-22 @ 07:01  -  01-07-22 @ 07:00  --------------------------------------------------------  IN: 480 mL / OUT: 1 mL / NET: 479 mL        T(C): 36.7 (01-07-22 @ 09:09), Max: 37.2 (01-06-22 @ 14:29)  HR: 97 (01-07-22 @ 09:09) (66 - 110)  BP: 117/76 (01-07-22 @ 09:09) (106/69 - 124/76)  RR: 18 (01-07-22 @ 09:09) (18 - 18)  SpO2: 98% (01-07-22 @ 09:09) (98% - 100%)  Wt(kg): --    PHYSICAL EXAM:  GENERAL: NAD, well-groomed, well-developed  HEAD:  Atraumatic, Normocephalic  EYES: EOMI, PERRLA, conjunctiva and sclera clear  ENMT: No oropharyngeal exudates, erythema or lesions,  Moist mucous membranes  NECK: Supple, no cervical lymphadenopathy  NERVOUS SYSTEM:  Alert & Oriented X3, CN II-XII intact, moves extremities spontaneously  CHEST/LUNG: Clear to auscultation bilaterally; no rhonchi  HEART: Regular rate and rhythm; No murmurs  ABDOMEN: Soft, RUQ tenderness to palpation, hepatomegaly  EXTREMITIES:  2+ radial Pulses, No cyanosis or edema  SKIN: warm, dry      MEDICATIONS  (STANDING):  furosemide    Tablet 20 milliGRAM(s) Oral daily  influenza  Vaccine (HIGH DOSE) 0.7 milliLiter(s) IntraMuscular once  sodium chloride 0.9%. 500 milliLiter(s) (30 mL/Hr) IV Continuous <Continuous>  spironolactone 25 milliGRAM(s) Oral daily    MEDICATIONS  (PRN):  ondansetron Injectable 4 milliGRAM(s) IV Push once PRN Nausea and/or Vomiting  oxyCODONE    IR 5 milliGRAM(s) Oral every 6 hours PRN Severe Pain (7 - 10)      No Known Allergies      LABS:                        7.1    2.82  )-----------( 96       ( 07 Jan 2022 06:05 )             22.1     01-07    143  |  111<H>  |  18  ----------------------------<  82  4.3   |  22  |  0.92    Ca    8.2<L>      07 Jan 2022 06:06  Phos  3.1     01-07  Mg     2.3     01-07    TPro  7.2  /  Alb  3.0<L>  /  TBili  3.1<H>  /  DBili  x   /  AST  312<H>  /  ALT  101<H>  /  AlkPhos  87  01-05    PT/INR - ( 07 Jan 2022 06:04 )   PT: 17.3 sec;   INR: 1.47 ratio         PTT - ( 07 Jan 2022 06:04 )  PTT:32.1 sec     < from: CT Abdomen and Pelvis w/ IV Cont (01.06.22 @ 15:58) >  FINDINGS:  LOWER CHEST: Bandlike atelectasis in the right posterior lower lobe. No   discrete pulmonary nodules in the lung bases.    LIVER: Nodular irregular contourof the liver consistent with cirrhosis.   Multiple confluent masses involving nearly the entire right hepatic lobe,   segment 7, 8, 5 and 6 and demonstrate partially arterial enhancement and   large areas of central hypoattenuation, likely necrosis, specifically in   the lesions located in segment 7 and 8. More centrally located necrosis   is seen in the lesion in segment 5. Areas of necrosis within the masses   in segment 7 and 8 reach the hepatic capsule with several foci concerning   for impending disruption of the capsule by tumor, for example image 52 of   series 6 and 12/93.    No active arterial and venous contrast extravasate or active bleeding   from these areas. Adjacent ascites have fluid attenuation.    Additional arterially enhancing masses in segment IVb subcapsular 1.8 cm   and in segment 3, 3.8 x 3.4 cm  with washout are concerning for   additional HCC. There is vascular tumor infiltration into the left portal   vein branch from the lesion in segment 3 as well as vascular tumor   infiltration into the portal bifurcation of the main portal vein from the   large masses in the right lobe.    BILE DUCTS: Normal caliber in the left lobe, obscured in the right lobe.  GALLBLADDER: Gallbladder sludge.  SPLEEN: Enlarged, 13.5 cm.  PANCREAS: Within normal limits.  ADRENALS: Within normal limits.  KIDNEYS/URETERS: Normal size and symmetric enhancement. Numerous   hypodense lesions in bilateral kidneys, the largest in the left midpole   measuring 2.0cm. No hydronephrosis or hydroureter.    BLADDER: Decompressed..  REPRODUCTIVE ORGANS: Uterus has multiple calcified fibroids. Normal   appearance of bilateral adnexa    BOWEL: Stomach, small and large bowel are grossly within normal limits.   No bowel obstruction. Appendix is normal.  PERITONEUM: Abdominal ascites in all 4 quadrants. Ascites demonstrates   simple fluid attenuation surrounding the liver, in bilateral paracolic   gutters and in the pelvis. No sedimentation.  VESSELS: Aorta within normal limits. IVC patent. Left and middle hepatic   vein patent. Right hepatic vein not delineated due to tumor compression   versus infiltration. There is tumor infiltration into the left and right   portal vein branches as detailed above. Extensive perigastric collaterals   and splenorenal shunt. Patent SMV.  RETROPERITONEUM/LYMPH NODES: No lymphadenopathy.  ABDOMINAL WALL: Within normal limits.  BONES: Extensive degenerative changes in the lower lumbar and lower   thoracic spine. No suspicious osseous lesions.    IMPRESSION:  1.  Large confluent masses of partially necrotic HCC in the right liver   lobe with involvement of the liver capsule in segment 7 and 8. In some   areas there is concern for pending disruption of the capsule from the   necrotic tumor in segment 7 and 8 as described above. No active contrast   extravasate in arterial or venous phases to suggest active bleeding.  2. Liver cirrhosis with portal hypertension including splenomegaly,   splenorenal collaterals and a large amount of ascites with simple fluid   attenuation. No extravasate of contrast material into the ascites to   suggest active bleeding.  3. Additional HCC in the left hepatic lobe. Tumor infiltration of the   left portal vein branch and main portal vein as described above.  4. Fibroid uterus.    < end of copied text >            RADIOLOGY & ADDITIONAL TESTS:  Studies reviewed.

## 2022-01-07 NOTE — PROGRESS NOTE ADULT - SUBJECTIVE AND OBJECTIVE BOX
Interventional Radiology Follow- Up Note      76y Female with pmhx of HCC admitted 1/1/21 for anemia, malignant ascites, TANNER, and abdominal pain. IR previously consulted for liver directed therapy. Patient seen and examined @ bedside.    Vitals: T(F): 98.1 (01-07-22 @ 09:09), Max: 98.9 (01-06-22 @ 14:29)  HR: 97 (01-07-22 @ 09:09) (66 - 110)  BP: 117/76 (01-07-22 @ 09:09) (106/69 - 124/76)  RR: 18 (01-07-22 @ 09:09) (18 - 18)  SpO2: 98% (01-07-22 @ 09:09) (98% - 100%)  Wt(kg): --    LABS:                        7.1    2.82  )-----------( 96       ( 07 Jan 2022 06:05 )             22.1     01-07    143  |  111<H>  |  18  ----------------------------<  82  4.3   |  22  |  0.92    Ca    8.2<L>      07 Jan 2022 06:06  Phos  3.1     01-07  Mg     2.3     01-07      PT/INR - ( 07 Jan 2022 06:04 )   PT: 17.3 sec;   INR: 1.47 ratio         PTT - ( 07 Jan 2022 06:04 )  PTT:32.1 sec  I&O's Detail    06 Jan 2022 07:01  -  07 Jan 2022 07:00  --------------------------------------------------------  IN:    Oral Fluid: 480 mL  Total IN: 480 mL    OUT:    Voided (mL): 1 mL  Total OUT: 1 mL    Total NET: 479 mL      07 Jan 2022 07:01  -  07 Jan 2022 11:35  --------------------------------------------------------  IN:  Total IN: 0 mL    OUT:    Oral Fluid: 0 mL  Total OUT: 0 mL    Total NET: 0 mL    EXAM: MR ABDOMEN WAW IC      PROCEDURE DATE: 12/20/2021        INTERPRETATION: CLINICAL INFORMATION: Hepatocellular carcinoma status post right hepatic lobe radio embolization November 2020.    COMPARISON: CT chest 11/19/2021, MR abdomen 10/15/2021    CONTRAST/COMPLICATIONS:  IV Contrast: Gadavist 6.5 cc administered 1 cc discarded  Oral Contrast: NONE  Complications: None reported at time of study completion    PROCEDURE:  MRI of the abdomen was performed.  MRCP was performed.    FINDINGS:  LOWER CHEST: Within normal limits.    LIVER: Cirrhosis. Status post radio embolization of a right hepatic lobe lesion as follows:    Lesion #: 1  Location: Segment 6/7  Size: 7.9 x 7.8 cm (16:41), previously 7.7 x 7.4 cm.  Enhancement: Yes; central necrosis posttreatment with nodular enhancing foci at the periphery of the lesion. Findings are unchanged from prior imaging 10/15/2021  Washout: NO  LI-RADS v 2018 Category: LR-TR Viable    Progression of disease posterior and superior to the lesion above and segment 7 as follows:    Lesion #: 2  Location: Segment 7  Size: 8.5 x 6.0 cm (16:23), previously 5.4 x 4.2 cm 10/15/2021. New associated tumor and vein involving branches of the right portal vein leading to the lesion.  AP Hyperenhancement: YES  PV/Equilibrium Washout: YES  Capsule Appearance: YES  Threshold Growth (>50% size increase in <= 6 months): YES  Ancillary features: T2 hyperintensity, mosaic attenuation.  LI-RADS v 2018 Category: LR-TIV (Tumor in Vein)    Additional infiltrative disease involving segments 2/3 with associated tumor in vein involving branches of the left portal vein is identified on series 20 image is 46-57.    BILE DUCTS: Normal caliber.  GALLBLADDER: Within normal limits.  SPLEEN: Splenomegaly.  PANCREAS: Within normal limits.  ADRENALS: Within normal limits.  KIDNEYS/URETERS: Left renal cysts.    VISUALIZED PORTIONS:  BOWEL: Within normal limits.  PERITONEUM: Moderate to large volume ascites with interval increase.  VESSELS: Splenic vein and main portal vein are patent. Tumor in vein involving branches of the right and left portal vein as above.  RETROPERITONEUM/LYMPH NODES: No lymphadenopathy.  ABDOMINAL WALL: Within normal limits.  BONES: Within normal limits.    IMPRESSION:  Marked progression of disease in segment 7 posterior and superior to a partially treated lesion in the right hepatic lobe. 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    New infiltrative disease in segments 2/3 with associated tumor in vein involving branches of the left portal vein.    Moderate to large volume ascites with interval increase.        --- End of Report ---    PHYSICAL EXAM:  General: Nontoxic, in NAD, chronically ill appearing  Neuro:  Alert & oriented x 3  Lung: respirations nonlabored, good inspiratory effort  Abdomen: visibly distended  Extremities: no pedal edema or calf tenderness noted     Impression: 76y Female with pmhx of Hep C cirrhosis, HCC s/p SIRT 11/4/2020, HTN, complaining of RUQ abdominal pain for the last 3 weeks.    1.  HCC  - 12/20 MRI with POD  - imaging reviewed and discussed with pt  - bilirubin elevated  - case was discussed at 1/6  tumor board.  Limited options for systemic therapy  - discussed options of hepatic angiogram with bland embolization, mapping angiogram in anticipation for Y90 (if can selectively identify arterial supply to new viable right lobe tumor), or no intervention.    - Pt stating that she would like to proceed with intervention.  Depending on angiogram, may need to perform intervention in a staged approach, given elevated bilirubin  - reviewed procedure, risks (AALIYAH, worsening LFTs, bleeding, infection), benefits (tumor control, pain relief), and alternatives.   - will bring pt to IR later today for angiogram  - pt has been NPO since MN  - reviewed above with Medicine NP    2.  TANNER  - pt admitted with TANNER  - evaluated by nephrology  - reviewed 1/7 note, no objection from renal standpoint for angiogram  - monitor renal function      Please call IR at extension [phone] with any questions, concerns, or issues regarding above.   Interventional Radiology Follow- Up Note      76y Female with pmhx of HCC admitted 1/1/21 for anemia, malignant ascites, TANNER, and abdominal pain. IR previously consulted for liver directed therapy. Patient seen and examined @ bedside.    Vitals: T(F): 98.1 (01-07-22 @ 09:09), Max: 98.9 (01-06-22 @ 14:29)  HR: 97 (01-07-22 @ 09:09) (66 - 110)  BP: 117/76 (01-07-22 @ 09:09) (106/69 - 124/76)  RR: 18 (01-07-22 @ 09:09) (18 - 18)  SpO2: 98% (01-07-22 @ 09:09) (98% - 100%)  Wt(kg): --    LABS:                        7.1    2.82  )-----------( 96       ( 07 Jan 2022 06:05 )             22.1     01-07    143  |  111<H>  |  18  ----------------------------<  82  4.3   |  22  |  0.92    Ca    8.2<L>      07 Jan 2022 06:06  Phos  3.1     01-07  Mg     2.3     01-07      PT/INR - ( 07 Jan 2022 06:04 )   PT: 17.3 sec;   INR: 1.47 ratio         PTT - ( 07 Jan 2022 06:04 )  PTT:32.1 sec  I&O's Detail    06 Jan 2022 07:01  -  07 Jan 2022 07:00  --------------------------------------------------------  IN:    Oral Fluid: 480 mL  Total IN: 480 mL    OUT:    Voided (mL): 1 mL  Total OUT: 1 mL    Total NET: 479 mL      07 Jan 2022 07:01  -  07 Jan 2022 11:35  --------------------------------------------------------  IN:  Total IN: 0 mL    OUT:    Oral Fluid: 0 mL  Total OUT: 0 mL    Total NET: 0 mL    EXAM: MR ABDOMEN WAW IC      PROCEDURE DATE: 12/20/2021        INTERPRETATION: CLINICAL INFORMATION: Hepatocellular carcinoma status post right hepatic lobe radio embolization November 2020.    COMPARISON: CT chest 11/19/2021, MR abdomen 10/15/2021    CONTRAST/COMPLICATIONS:  IV Contrast: Gadavist 6.5 cc administered 1 cc discarded  Oral Contrast: NONE  Complications: None reported at time of study completion    PROCEDURE:  MRI of the abdomen was performed.  MRCP was performed.    FINDINGS:  LOWER CHEST: Within normal limits.    LIVER: Cirrhosis. Status post radio embolization of a right hepatic lobe lesion as follows:    Lesion #: 1  Location: Segment 6/7  Size: 7.9 x 7.8 cm (16:41), previously 7.7 x 7.4 cm.  Enhancement: Yes; central necrosis posttreatment with nodular enhancing foci at the periphery of the lesion. Findings are unchanged from prior imaging 10/15/2021  Washout: NO  LI-RADS v 2018 Category: LR-TR Viable    Progression of disease posterior and superior to the lesion above and segment 7 as follows:    Lesion #: 2  Location: Segment 7  Size: 8.5 x 6.0 cm (16:23), previously 5.4 x 4.2 cm 10/15/2021. New associated tumor and vein involving branches of the right portal vein leading to the lesion.  AP Hyperenhancement: YES  PV/Equilibrium Washout: YES  Capsule Appearance: YES  Threshold Growth (>50% size increase in <= 6 months): YES  Ancillary features: T2 hyperintensity, mosaic attenuation.  LI-RADS v 2018 Category: LR-TIV (Tumor in Vein)    Additional infiltrative disease involving segments 2/3 with associated tumor in vein involving branches of the left portal vein is identified on series 20 image is 46-57.    BILE DUCTS: Normal caliber.  GALLBLADDER: Within normal limits.  SPLEEN: Splenomegaly.  PANCREAS: Within normal limits.  ADRENALS: Within normal limits.  KIDNEYS/URETERS: Left renal cysts.    VISUALIZED PORTIONS:  BOWEL: Within normal limits.  PERITONEUM: Moderate to large volume ascites with interval increase.  VESSELS: Splenic vein and main portal vein are patent. Tumor in vein involving branches of the right and left portal vein as above.  RETROPERITONEUM/LYMPH NODES: No lymphadenopathy.  ABDOMINAL WALL: Within normal limits.  BONES: Within normal limits.    IMPRESSION:  Marked progression of disease in segment 7 posterior and superior to a partially treated lesion in the right hepatic lobe. 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    New infiltrative disease in segments 2/3 with associated tumor in vein involving branches of the left portal vein.    Moderate to large volume ascites with interval increase.        --- End of Report ---    PHYSICAL EXAM:  General: Nontoxic, in NAD, chronically ill appearing  Neuro:  Alert & oriented x 3  Lung: respirations nonlabored, good inspiratory effort  Abdomen: visibly distended  Extremities: no pedal edema or calf tenderness noted     Impression: 76y Female with pmhx of Hep C cirrhosis, HCC s/p SIRT 11/4/2020, HTN, complaining of RUQ abdominal pain for the last 3 weeks.    1.  HCC  - 12/20 MRI with POD  - imaging reviewed and discussed with pt  - bilirubin elevated  - case was discussed at 1/6  tumor board.  Limited options for systemic therapy  - discussed options of hepatic angiogram with bland embolization, mapping angiogram in anticipation for Y90 (if can selectively identify arterial supply to new viable right lobe tumor), or no intervention.    - Pt stating that she would like to proceed with intervention.  Depending on angiogram, may need to perform intervention in a staged approach, given elevated bilirubin  - reviewed procedure, risks (AALIYAH, worsening LFTs, bleeding, infection), benefits (tumor control, pain relief), and alternatives.   - will bring pt to IR later today for angiogram  - pt has been NPO since MN  - reviewed above with Medicine NP  - s/p 1 unit PRBC this am    2.  TANNER  - pt admitted with TANNER  - evaluated by nephrology  - reviewed 1/7 note, no objection from renal standpoint for angiogram  - monitor renal function      Please call IR at extension [phone] with any questions, concerns, or issues regarding above.

## 2022-01-07 NOTE — PRE-ANESTHESIA EVALUATION ADULT - NSANTHOSAYNRD_GEN_A_CORE
No. CONNIE screening performed.  STOP BANG Legend: 0-2 = LOW Risk; 3-4 = INTERMEDIATE Risk; 5-8 = HIGH Risk
No. CONNIE screening performed.  STOP BANG Legend: 0-2 = LOW Risk; 3-4 = INTERMEDIATE Risk; 5-8 = HIGH Risk

## 2022-01-07 NOTE — PROGRESS NOTE ADULT - SUBJECTIVE AND OBJECTIVE BOX
Patient is a 76y old  Female who presents with a chief complaint of     SUBJECTIVE / OVERNIGHT EVENTS: no events over night     T(C): 36.6 (01-07-22 @ 21:20), Max: 36.7 (01-07-22 @ 12:13)  HR: 89 (01-07-22 @ 21:20) (89 - 103)  BP: 112/68 (01-07-22 @ 21:20) (111/76 - 132/80)  RR: 16 (01-07-22 @ 21:20) (16 - 18)  SpO2: 98% (01-07-22 @ 21:20) (98% - 99%)        MEDICATIONS  (STANDING):  furosemide    Tablet 20 milliGRAM(s) Oral daily  influenza  Vaccine (HIGH DOSE) 0.7 milliLiter(s) IntraMuscular once  sodium chloride 0.9%. 500 milliLiter(s) (30 mL/Hr) IV Continuous <Continuous>  sodium chloride 0.9%. 1000 milliLiter(s) (65 mL/Hr) IV Continuous <Continuous>  spironolactone 25 milliGRAM(s) Oral daily    MEDICATIONS  (PRN):  ondansetron Injectable 4 milliGRAM(s) IV Push once PRN Nausea and/or Vomiting  oxyCODONE    IR 5 milliGRAM(s) Oral every 6 hours PRN Severe Pain (7 - 10)        PHYSICAL EXAM:  GENERAL: NAD, well-developed  HEAD:  Atraumatic, Normocephalic  EYES: EOMI,  conjunctiva and sclera clear  NECK: Supple, No JVD  CHEST/LUNG: Clear to auscultation bilaterally; No wheeze  HEART: Regular rate and rhythm; No murmurs, rubs, or gallops  ABDOMEN: Soft, Nontender, Nondistended; Bowel sounds present  EXTREMITIES:  2+ Peripheral Pulses, No clubbing, cyanosis, or edema  PSYCH: AAOx3  NEUROLOGY: non-focal  SKIN: No rashes or lesions                                         9.9    3.43  )-----------( 135      ( 07 Jan 2022 21:02 )             30.1             PT/INR - ( 07 Jan 2022 06:04 )   PT: 17.3 sec;   INR: 1.47 ratio         PTT - ( 07 Jan 2022 06:04 )  PTT:32.1 sec  143|111|18<82  4.3|22|0.92  8.2,2.3,3.1  01-07 @ 06:06      CAPILLARY BLOOD GLUCOSE        RADIOLOGY & ADDITIONAL TESTS:    Imaging Personally Reviewed:    Consultant(s) Notes Reviewed:      Care Discussed with Consultants/Other Providers:

## 2022-01-07 NOTE — PROGRESS NOTE ADULT - SUBJECTIVE AND OBJECTIVE BOX
Chief Complaint:  Patient is a 76y old  Female who presents with a chief complaint of RUQ abdominal pain (07 Jan 2022 10:19)      Interval Events:   Reports feeling well. Denies any N/V/D/C, abd pain, melena or hematochezia.      Hospital Medications:  furosemide    Tablet 20 milliGRAM(s) Oral daily  influenza  Vaccine (HIGH DOSE) 0.7 milliLiter(s) IntraMuscular once  ondansetron Injectable 4 milliGRAM(s) IV Push once PRN  oxyCODONE    IR 5 milliGRAM(s) Oral every 6 hours PRN  sodium chloride 0.9%. 500 milliLiter(s) IV Continuous <Continuous>  spironolactone 25 milliGRAM(s) Oral daily      PMHX/PSHX:  Hypertension, unspecified type    Thrombocytopenia    Hepatic cirrhosis, unspecified hepatic cirrhosis type, unspecified whether ascites present    Vaginal cyst            ROS: 14 point ROS negative unless otherwise stated in subjective      PHYSICAL EXAM:     GENERAL:  Appears stated age, well-groomed, well-nourished, no distress  HEENT:  NC/AT,  conjunctivae clear and pink  CHEST:  Full & symmetric excursion, no increased effort, breath sounds clear  HEART:  Regular rhythm, S1, S2, no murmur/rub/S3/S4  ABDOMEN:  Soft, non-tender, +mildly-moderately distended, normoactive bowel sounds,    EXTREMITIES:  no cyanosis, clubbing or edema  SKIN:  No rash/erythema/ecchymoses/petechiae/wounds/abscess/warm/dry  NEURO:  Alert, orientedx3, no asterixis    Vital Signs:  Vital Signs Last 24 Hrs  T(C): 36.7 (07 Jan 2022 09:09), Max: 37.2 (06 Jan 2022 14:29)  T(F): 98.1 (07 Jan 2022 09:09), Max: 98.9 (06 Jan 2022 14:29)  HR: 97 (07 Jan 2022 09:09) (66 - 110)  BP: 117/76 (07 Jan 2022 09:09) (106/69 - 124/76)  BP(mean): --  RR: 18 (07 Jan 2022 09:09) (18 - 18)  SpO2: 98% (07 Jan 2022 09:09) (98% - 100%)  Daily     Daily     LABS:                        7.1    2.82  )-----------( 96       ( 07 Jan 2022 06:05 )             22.1     01-07    143  |  111<H>  |  18  ----------------------------<  82  4.3   |  22  |  0.92    Ca    8.2<L>      07 Jan 2022 06:06  Phos  3.1     01-07  Mg     2.3     01-07        PT/INR - ( 07 Jan 2022 06:04 )   PT: 17.3 sec;   INR: 1.47 ratio         PTT - ( 07 Jan 2022 06:04 )  PTT:32.1 sec        Imaging:       ACC: [id number] EXAM:  CT ABDOMEN AND PELVIS IC                          PROCEDURE DATE:  01/06/2022          INTERPRETATION:  CLINICAL INFORMATION: 76-year-old female patient with   HCC, hemorrhage ascites, triple phase CT    COMPARISON: None.    CONTRAST/COMPLICATIONS:  IV Contrast: Omnipaque 350  90 cc administered   10 cc discarded  Oral Contrast: NONE  Complications: None reported at time of study completion    PROCEDURE:  CT of the Abdomen and Pelvis was performed.  Arterial, Portal Venous and Delayed phases were acquired.  Sagittal and coronal reformats were performed.    FINDINGS:  LOWER CHEST: Bandlike atelectasis in the right posterior lower lobe. No   discrete pulmonary nodules in the lung bases.    LIVER: Nodular irregular contourof the liver consistent with cirrhosis.   Multiple confluent masses involving nearly the entire right hepatic lobe,   segment 7, 8, 5 and 6 and demonstrate partially arterial enhancement and   large areas of central hypoattenuation, likely necrosis, specifically in   the lesions located in segment 7 and 8. More centrally located necrosis   is seen in the lesion in segment 5. Areas of necrosis within the masses   in segment 7 and 8 reach the hepatic capsule with several foci concerning   for impending disruption of the capsule by tumor, for example image 52 of   series 6 and 12/93.    No active arterial and venous contrast extravasate or active bleeding   from these areas. Adjacent ascites have fluid attenuation.    Additional arterially enhancing masses in segment IVb subcapsular 1.8 cm   and in segment 3, 3.8 x 3.4 cm  with washout are concerning for   additional HCC. There is vascular tumor infiltration into the left portal   vein branch from the lesion in segment 3 as well as vascular tumor   infiltration into the portal bifurcation of the main portal vein from the   large masses in the right lobe.    BILE DUCTS: Normal caliber in the left lobe, obscured in the right lobe.  GALLBLADDER: Gallbladder sludge.  SPLEEN: Enlarged, 13.5 cm.  PANCREAS: Within normal limits.  ADRENALS: Within normal limits.  KIDNEYS/URETERS: Normal size and symmetric enhancement. Numerous   hypodense lesions in bilateral kidneys, the largest in the left midpole   measuring 2.0cm. No hydronephrosis or hydroureter.    BLADDER: Decompressed..  REPRODUCTIVE ORGANS: Uterus has multiple calcified fibroids. Normal   appearance of bilateral adnexa    BOWEL: Stomach, small and large bowel are grossly within normal limits.   No bowel obstruction. Appendix is normal.  PERITONEUM: Abdominal ascites in all 4 quadrants. Ascites demonstrates   simple fluid attenuation surrounding the liver, in bilateral paracolic   gutters and in the pelvis. No sedimentation.  VESSELS: Aorta within normal limits. IVC patent. Left and middle hepatic   vein patent. Right hepatic vein not delineated due to tumor compression   versus infiltration. There is tumor infiltration into the left and right   portal vein branches as detailed above. Extensive perigastric collaterals   and splenorenal shunt. Patent SMV.  RETROPERITONEUM/LYMPH NODES: No lymphadenopathy.  ABDOMINAL WALL: Within normal limits.  BONES: Extensive degenerative changes in the lower lumbar and lower   thoracic spine. No suspicious osseous lesions.    IMPRESSION:  1.  Large confluent masses of partially necrotic HCC in the right liver   lobe with involvement of the liver capsule in segment 7 and 8. In some   areas there is concern for pending disruption of the capsule from the   necrotic tumor in segment 7 and 8 as described above. No active contrast   extravasate in arterial or venous phases to suggest active bleeding.  2. Liver cirrhosis with portal hypertension including splenomegaly,   splenorenal collaterals and a large amount of ascites with simple fluid   attenuation. No extravasate of contrast material into the ascites to   suggest active bleeding.  3. Additional HCC in the left hepatic lobe. Tumor infiltration of the   left portal vein branch and main portal vein as described above.  4. Fibroid uterus.                 Chief Complaint:  Patient is a 76y old  Female who presents with a chief complaint of RUQ abdominal pain (07 Jan 2022 10:19)      Interval Events:   Unable to see pt today as she was down in IR.  Pending IR hepatic arteriogram, possible bland embolization, possible y90 Mapping today      Hospital Medications:  furosemide    Tablet 20 milliGRAM(s) Oral daily  influenza  Vaccine (HIGH DOSE) 0.7 milliLiter(s) IntraMuscular once  ondansetron Injectable 4 milliGRAM(s) IV Push once PRN  oxyCODONE    IR 5 milliGRAM(s) Oral every 6 hours PRN  sodium chloride 0.9%. 500 milliLiter(s) IV Continuous <Continuous>  spironolactone 25 milliGRAM(s) Oral daily      PMHX/PSHX:  Hypertension, unspecified type    Thrombocytopenia    Hepatic cirrhosis, unspecified hepatic cirrhosis type, unspecified whether ascites present    Vaginal cyst            ROS: unable to perform as pt was out of room       PHYSICAL EXAM: unable to perform as pt was out of room     Vital Signs:  Vital Signs Last 24 Hrs  T(C): 36.7 (07 Jan 2022 09:09), Max: 37.2 (06 Jan 2022 14:29)  T(F): 98.1 (07 Jan 2022 09:09), Max: 98.9 (06 Jan 2022 14:29)  HR: 97 (07 Jan 2022 09:09) (66 - 110)  BP: 117/76 (07 Jan 2022 09:09) (106/69 - 124/76)  BP(mean): --  RR: 18 (07 Jan 2022 09:09) (18 - 18)  SpO2: 98% (07 Jan 2022 09:09) (98% - 100%)  Daily     Daily     LABS:                        7.1    2.82  )-----------( 96       ( 07 Jan 2022 06:05 )             22.1     01-07    143  |  111<H>  |  18  ----------------------------<  82  4.3   |  22  |  0.92    Ca    8.2<L>      07 Jan 2022 06:06  Phos  3.1     01-07  Mg     2.3     01-07        PT/INR - ( 07 Jan 2022 06:04 )   PT: 17.3 sec;   INR: 1.47 ratio         PTT - ( 07 Jan 2022 06:04 )  PTT:32.1 sec        Imaging:       ACC: [id number] EXAM:  CT ABDOMEN AND PELVIS IC                          PROCEDURE DATE:  01/06/2022          INTERPRETATION:  CLINICAL INFORMATION: 76-year-old female patient with   HCC, hemorrhage ascites, triple phase CT    COMPARISON: None.    CONTRAST/COMPLICATIONS:  IV Contrast: Omnipaque 350  90 cc administered   10 cc discarded  Oral Contrast: NONE  Complications: None reported at time of study completion    PROCEDURE:  CT of the Abdomen and Pelvis was performed.  Arterial, Portal Venous and Delayed phases were acquired.  Sagittal and coronal reformats were performed.    FINDINGS:  LOWER CHEST: Bandlike atelectasis in the right posterior lower lobe. No   discrete pulmonary nodules in the lung bases.    LIVER: Nodular irregular contourof the liver consistent with cirrhosis.   Multiple confluent masses involving nearly the entire right hepatic lobe,   segment 7, 8, 5 and 6 and demonstrate partially arterial enhancement and   large areas of central hypoattenuation, likely necrosis, specifically in   the lesions located in segment 7 and 8. More centrally located necrosis   is seen in the lesion in segment 5. Areas of necrosis within the masses   in segment 7 and 8 reach the hepatic capsule with several foci concerning   for impending disruption of the capsule by tumor, for example image 52 of   series 6 and 12/93.    No active arterial and venous contrast extravasate or active bleeding   from these areas. Adjacent ascites have fluid attenuation.    Additional arterially enhancing masses in segment IVb subcapsular 1.8 cm   and in segment 3, 3.8 x 3.4 cm  with washout are concerning for   additional HCC. There is vascular tumor infiltration into the left portal   vein branch from the lesion in segment 3 as well as vascular tumor   infiltration into the portal bifurcation of the main portal vein from the   large masses in the right lobe.    BILE DUCTS: Normal caliber in the left lobe, obscured in the right lobe.  GALLBLADDER: Gallbladder sludge.  SPLEEN: Enlarged, 13.5 cm.  PANCREAS: Within normal limits.  ADRENALS: Within normal limits.  KIDNEYS/URETERS: Normal size and symmetric enhancement. Numerous   hypodense lesions in bilateral kidneys, the largest in the left midpole   measuring 2.0cm. No hydronephrosis or hydroureter.    BLADDER: Decompressed..  REPRODUCTIVE ORGANS: Uterus has multiple calcified fibroids. Normal   appearance of bilateral adnexa    BOWEL: Stomach, small and large bowel are grossly within normal limits.   No bowel obstruction. Appendix is normal.  PERITONEUM: Abdominal ascites in all 4 quadrants. Ascites demonstrates   simple fluid attenuation surrounding the liver, in bilateral paracolic   gutters and in the pelvis. No sedimentation.  VESSELS: Aorta within normal limits. IVC patent. Left and middle hepatic   vein patent. Right hepatic vein not delineated due to tumor compression   versus infiltration. There is tumor infiltration into the left and right   portal vein branches as detailed above. Extensive perigastric collaterals   and splenorenal shunt. Patent SMV.  RETROPERITONEUM/LYMPH NODES: No lymphadenopathy.  ABDOMINAL WALL: Within normal limits.  BONES: Extensive degenerative changes in the lower lumbar and lower   thoracic spine. No suspicious osseous lesions.    IMPRESSION:  1.  Large confluent masses of partially necrotic HCC in the right liver   lobe with involvement of the liver capsule in segment 7 and 8. In some   areas there is concern for pending disruption of the capsule from the   necrotic tumor in segment 7 and 8 as described above. No active contrast   extravasate in arterial or venous phases to suggest active bleeding.  2. Liver cirrhosis with portal hypertension including splenomegaly,   splenorenal collaterals and a large amount of ascites with simple fluid   attenuation. No extravasate of contrast material into the ascites to   suggest active bleeding.  3. Additional HCC in the left hepatic lobe. Tumor infiltration of the   left portal vein branch and main portal vein as described above.  4. Fibroid uterus.

## 2022-01-07 NOTE — PRE-ANESTHESIA EVALUATION ADULT - NSANTHAIRWAYFT_ENT_ALL_CORE
6cm thyromental distance  Adequate interincisor distance  FROM of neck
FROM  TMD>3FB  good mouth opening  denies loose, chipped or cracked teeth

## 2022-01-07 NOTE — PROGRESS NOTE ADULT - SUBJECTIVE AND OBJECTIVE BOX
Overnight events noted      VITAL:  T(C): , Max: 37.2 (01-06-22 @ 14:29)  T(F): , Max: 98.9 (01-06-22 @ 14:29)  HR: 97 (01-07-22 @ 09:09)  BP: 117/76 (01-07-22 @ 09:09)  BP(mean): --  RR: 18 (01-07-22 @ 09:09)  SpO2: 98% (01-07-22 @ 09:09)      PHYSICAL EXAM:  Constitutional: NAD, Alert  HEENT: NCAT, MMM  Neck: Supple, No JVD  Respiratory: CTA-b/l  Cardiovascular: RRR s1s2, no m/r/g  Gastrointestinal: BS+, soft, NT, (+)mild distension  Extremities: No peripheral edema b/l  Neurological: no focal deficits; strength grossly intact  Back: no CVAT b/l  Skin: No rashes, no nevi    LABS:                        7.1    2.82  )-----------( 96       ( 07 Jan 2022 06:05 )             22.1     Na(143)/K(4.3)/Cl(111)/HCO3(22)/BUN(18)/Cr(0.92)Glu(82)/Ca(8.2)/Mg(2.3)/PO4(3.1)    01-07 @ 06:06  Na(138)/K(3.2)/Cl(106)/HCO3(21)/BUN(18)/Cr(0.89)Glu(94)/Ca(7.9)/Mg(--)/PO4(--)    01-05 @ 10:36      IMPRESSION: 76F w/ HTN, HCV, cirrhosis, and HCC, 1/1/22 p/w abdominal pain    (1)TANNER - prerenal; resolved  (2)Hypokalemia - improved s/p repletion  (3)Hepatology - cirrhosis   (4)Hepatocellular CA - potentially today for IR-guided embolization today to prevent associated bleeding      RECOMMEND:  (1)Dose new meds for GFR 60ml/min  (2)BMP daily  (3)No renal objection to proceeding with embolization           Ganesh Guidry MD  [facility]  Office: [phone]  Cell: [phone]       Overnight events noted      VITAL:  T(C): , Max: 37.2 (01-06-22 @ 14:29)  T(F): , Max: 98.9 (01-06-22 @ 14:29)  HR: 97 (01-07-22 @ 09:09)  BP: 117/76 (01-07-22 @ 09:09)  BP(mean): --  RR: 18 (01-07-22 @ 09:09)  SpO2: 98% (01-07-22 @ 09:09)      PHYSICAL EXAM:  Constitutional: NAD, Alert  HEENT: NCAT, MMM  Neck: Supple, No JVD  Respiratory: CTA-b/l  Cardiovascular: RRR s1s2, no m/r/g  Gastrointestinal: BS+, soft, NT, (+)distension  Extremities: No peripheral edema b/l  Neurological: no focal deficits; strength grossly intact  Back: no CVAT b/l  Skin: No rashes, no nevi    LABS:                        7.1    2.82  )-----------( 96       ( 07 Jan 2022 06:05 )             22.1     Na(143)/K(4.3)/Cl(111)/HCO3(22)/BUN(18)/Cr(0.92)Glu(82)/Ca(8.2)/Mg(2.3)/PO4(3.1)    01-07 @ 06:06  Na(138)/K(3.2)/Cl(106)/HCO3(21)/BUN(18)/Cr(0.89)Glu(94)/Ca(7.9)/Mg(--)/PO4(--)    01-05 @ 10:36      IMPRESSION: 76F w/ HTN, HCV, cirrhosis, and HCC, 1/1/22 p/w abdominal pain    (1)TANNER - prerenal; resolved  (2)Hypokalemia - improved s/p repletion  (3)Hepatology - cirrhosis   (4)Hepatocellular CA - potentially today for IR-guided embolization today to prevent associated bleeding      RECOMMEND:  (1)Dose new meds for GFR 60ml/min  (2)BMP daily  (3)No renal objection to proceeding with embolization           Ganesh Guidry MD  [facility]  Office: [phone]  Cell: [phone]       No pain, no sob      VITAL:  T(C): , Max: 37.2 (01-06-22 @ 14:29)  T(F): , Max: 98.9 (01-06-22 @ 14:29)  HR: 97 (01-07-22 @ 09:09)  BP: 117/76 (01-07-22 @ 09:09)  BP(mean): --  RR: 18 (01-07-22 @ 09:09)  SpO2: 98% (01-07-22 @ 09:09)      PHYSICAL EXAM:  Constitutional: NAD, Alert  HEENT: NCAT, MMM  Neck: Supple, No JVD  Respiratory: CTA-b/l  Cardiovascular: RRR s1s2, no m/r/g  Gastrointestinal: BS+, soft, NT, (+)distension  Extremities: No peripheral edema b/l  Neurological: no focal deficits; strength grossly intact  Back: no CVAT b/l  Skin: No rashes, no nevi    LABS:                        7.1    2.82  )-----------( 96       ( 07 Jan 2022 06:05 )             22.1     Na(143)/K(4.3)/Cl(111)/HCO3(22)/BUN(18)/Cr(0.92)Glu(82)/Ca(8.2)/Mg(2.3)/PO4(3.1)    01-07 @ 06:06  Na(138)/K(3.2)/Cl(106)/HCO3(21)/BUN(18)/Cr(0.89)Glu(94)/Ca(7.9)/Mg(--)/PO4(--)    01-05 @ 10:36      IMPRESSION: 76F w/ HTN, HCV, cirrhosis, and HCC, 1/1/22 p/w abdominal pain    (1)TANNER - prerenal; resolved  (2)Hypokalemia - improved s/p repletion  (3)Hepatology - cirrhosis   (4)Hepatocellular CA - potentially today for IR-guided embolization today to prevent associated bleeding      RECOMMEND:  (1)Dose new meds for GFR 60ml/min  (2)BMP daily  (3)No renal objection to proceeding with embolization           Ganesh Guidry MD  [facility]  Office: [phone]  Cell: [phone]

## 2022-01-07 NOTE — PROCEDURE NOTE - PLAN
- NM Lung shunt study  - 4 hour bedrest with RLE kept straight  - Will plan for Y90 treatment next week provided no contraindications.

## 2022-01-08 NOTE — PROGRESS NOTE ADULT - ASSESSMENT
76y Female with pmhx of Hep C cirrhosis, HCC s/p SIRT 11/4/2020, HTN, complaining of RUQ abdominal pain for the last 3 weeks. s/p EGD 1/4/22- PHG, no varices, mild schatzki ring, gastric polyps, duodenal lipoma; plan for Y90 of HCC lesion    #Decompensated HCV cirrhosis (GT1a) c/b ascites, thrombocytopenia  #R lobe HCC in seg 6/7 (dx 08/2020, s/p R lobe SIRT 11/2020 now with recent disease progression)  Dx HCV in 1990's (exposure thought 2/2 blood transfusion in 1988 s/p MCV or exposure to unsterile tx from injection for pain control). She reports taking medication previously, but does not recall the exact treatment. Referred to liver clinic 08/2020- w/u showed fibroscan - 19.9 kPa suggestive of cirrhosis, , MRI and US w/ 7.5 cm lesion in R hepatic lob seg 6/7 c/w HCC (LI-RADS 5). No e/o met disease on bone scans EGD 9/30/2020- Small (< 5 mm) EV, GAVE w/o bleeding, gastric polyps s/p bx, nodule in duodenum s/p bx. C-scope 9/30/2020- Internal hemorrhoids, no specimens collected. s/p IR random liver bx 10/2020 with HVPG 11 mmHg and path w/ chronic HCV and cirrhosis mild steatohepatitis. s/p IR R lobe SIRT 11/2020. F/u MR abd 02/2021 showing decreased areas of internal enhancement c/w SIRT tx. Was lost to f/u a few months after SIRT. 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 Per outpt IR note from Dr. Ramirez, Given size of lesions and that she is not a surgical candidate, recommending radioembolization as palliative treatment.    -Ascites: large volume ascites on CT A/P this admission; s/p para 1/3 with 3.6 L removed  -Varices: EGD 1/4/22- PHG, no varices  -HE: none on exam    #c/f bleeding from HCC lesion- found to have Hgb drop 13 -> 7.5 in 2 months; s/p 1 u pRBC; Hgb stable at ~8 (BL 13); CTA A/P large confluent masses of partially necrotic HCC in R liver lobe w/ liver capsule involvement in seg 7/8, no active contrast extravasate to suggest active bleeding, liver cirrhosis with portal HTN, additional HCC in L hepatic lobe  -1/7- IR hepatic arteriogram, possible bland embolization, possible y90 Mapping     Recommendations:  -trend CBC, CMP, INR  -appreciate IR, palliative care and oncology recs  -c/w lasix 20 mg daily + spironolactone 25 mg daily  -monitor I/Os  -plan for Y90 next week by IR    Hepatology will continue to follow.     Elton Young, PGY5  Gastroenterology/Hepatology Fellow  Available on Microsoft Teams  [phone] ([employer] Short Range Pager)  [phone] (Long Range Pager)    After 5pm, please contact the on-call GI fellow. [phone]

## 2022-01-08 NOTE — PROGRESS NOTE ADULT - SUBJECTIVE AND OBJECTIVE BOX
Patient is a 76y old  Female who presents with a chief complaint of     SUBJECTIVE / OVERNIGHT EVENTS: no events over night     T(C): 36.7 (01-08-22 @ 16:43), Max: 36.7 (01-08-22 @ 08:20)  HR: 93 (01-08-22 @ 16:43) (88 - 104)  BP: 124/76 (01-08-22 @ 16:43) (109/70 - 124/76)  RR: 17 (01-08-22 @ 16:43) (16 - 17)  SpO2: 98% (01-08-22 @ 16:43) (98% - 99%)      MEDICATIONS  (STANDING):  furosemide    Tablet 20 milliGRAM(s) Oral daily  influenza  Vaccine (HIGH DOSE) 0.7 milliLiter(s) IntraMuscular once  sodium chloride 0.9%. 500 milliLiter(s) (30 mL/Hr) IV Continuous <Continuous>  sodium chloride 0.9%. 1000 milliLiter(s) (65 mL/Hr) IV Continuous <Continuous>  spironolactone 25 milliGRAM(s) Oral daily    MEDICATIONS  (PRN):  ondansetron Injectable 4 milliGRAM(s) IV Push once PRN Nausea and/or Vomiting  oxyCODONE    IR 5 milliGRAM(s) Oral every 6 hours PRN Severe Pain (7 - 10)        PHYSICAL EXAM:  GENERAL: NAD, well-developed  HEAD:  Atraumatic, Normocephalic  EYES: EOMI,  conjunctiva and sclera clear  NECK: Supple, No JVD  CHEST/LUNG: Clear to auscultation bilaterally; No wheeze  HEART: Regular rate and rhythm; No murmurs, rubs, or gallops  ABDOMEN: Soft, Nontender, Nondistended; Bowel sounds present  EXTREMITIES:  2+ Peripheral Pulses, No clubbing, cyanosis, or edema  PSYCH: AAOx3  NEUROLOGY: non-focal  SKIN: No rashes or lesions                                                         8.3    2.62  )-----------( 91       ( 08 Jan 2022 19:02 )             25.3             PT/INR - ( 07 Jan 2022 06:04 )   PT: 17.3 sec;   INR: 1.47 ratio         PTT - ( 07 Jan 2022 06:04 )  PTT:32.1 sec  140|109|18<80  3.6|20|0.89  8.0,--,--  01-08 @ 06:07  CAPILLARY BLOOD GLUCOSE        RADIOLOGY & ADDITIONAL TESTS:    Imaging Personally Reviewed:    Consultant(s) Notes Reviewed:      Care Discussed with Consultants/Other Providers:

## 2022-01-08 NOTE — PROGRESS NOTE ADULT - SUBJECTIVE AND OBJECTIVE BOX
Gastroenterology/Hepatology Progress Note      Interval Events:   - yesterday underwent IR mapping for Y90  - no new complaints today    Allergies:  No Known Allergies      Hospital Medications:  furosemide    Tablet 20 milliGRAM(s) Oral daily  influenza  Vaccine (HIGH DOSE) 0.7 milliLiter(s) IntraMuscular once  ondansetron Injectable 4 milliGRAM(s) IV Push once PRN  oxyCODONE    IR 5 milliGRAM(s) Oral every 6 hours PRN  sodium chloride 0.9%. 500 milliLiter(s) IV Continuous <Continuous>  sodium chloride 0.9%. 1000 milliLiter(s) IV Continuous <Continuous>  spironolactone 25 milliGRAM(s) Oral daily        PHYSICAL EXAM:   Vital Signs:  Vital Signs Last 24 Hrs  T(C): 36.7 (08 Jan 2022 08:20), Max: 37.1 (08 Jan 2022 00:16)  T(F): 98.1 (08 Jan 2022 08:20), Max: 98.8 (08 Jan 2022 00:16)  HR: 88 (08 Jan 2022 08:20) (81 - 103)  BP: 116/65 (08 Jan 2022 08:20) (105/58 - 132/80)  BP(mean): --  RR: 16 (08 Jan 2022 08:20) (16 - 18)  SpO2: 98% (08 Jan 2022 08:20) (97% - 99%)  Daily Height in cm: 165.1 (07 Jan 2022 14:13)    Daily     GENERAL:  No acute distress  HEENT:  NCAT, no scleral icterus  CHEST: no resp distress  HEART:  RRR  ABDOMEN:  Soft, non-tender, non-distended, normoactive bowel sounds, no masses  EXTREMITIES:  No cyanosis, clubbing, or edema  SKIN:  No rash/erythema/ecchymoses/petechiae/wounds/abscess/warm/dry  NEURO:  Alert and oriented x 3, no asterixis, no tremor    LABS:                        7.4    2.18  )-----------( 85       ( 08 Jan 2022 06:07 )             22.7     Mean Cell Volume: 92.3 fl (01-08-22 @ 06:07)    01-08    140  |  109<H>  |  18  ----------------------------<  80  3.6   |  20<L>  |  0.89    Ca    8.0<L>      08 Jan 2022 06:07  Phos  3.1     01-07  Mg     2.3     01-07        PT/INR - ( 07 Jan 2022 06:04 )   PT: 17.3 sec;   INR: 1.47 ratio         PTT - ( 07 Jan 2022 06:04 )  PTT:32.1 sec          Imaging:

## 2022-01-09 NOTE — PROGRESS NOTE ADULT - ASSESSMENT
76y Female with pmhx of Hep C cirrhosis, HCC s/p IR embolization, HTN, complaining of RUQ abdominal pain for the last 3 weeks.       IR re consult fro Embolization   Ascites Malignancy related GI/ Hetology following    IR following s/p paracentesis   Advance diet as tolerated   HCC Hepatology following  Mapping done for IR [place] embolization   s/ p EGD     Anemia in the setting of Cirrhosis and HCC- Transfuse prn.   Monitor

## 2022-01-09 NOTE — PROGRESS NOTE ADULT - SUBJECTIVE AND OBJECTIVE BOX
Patient is a 76y old  Female who presents with a chief complaint of     SUBJECTIVE / OVERNIGHT EVENTS: no events over night     T(C): 37 (01-09-22 @ 17:02), Max: 37 (01-09-22 @ 17:02)  HR: 95 (01-09-22 @ 17:02) (87 - 98)  BP: 123/73 (01-09-22 @ 17:02) (109/69 - 126/72)  RR: 17 (01-09-22 @ 17:02) (17 - 17)  SpO2: 98% (01-09-22 @ 17:02) (97% - 99%)      MEDICATIONS  (STANDING):  furosemide    Tablet 20 milliGRAM(s) Oral daily  influenza  Vaccine (HIGH DOSE) 0.7 milliLiter(s) IntraMuscular once  sodium chloride 0.9%. 500 milliLiter(s) (30 mL/Hr) IV Continuous <Continuous>  sodium chloride 0.9%. 1000 milliLiter(s) (65 mL/Hr) IV Continuous <Continuous>  spironolactone 25 milliGRAM(s) Oral daily    MEDICATIONS  (PRN):  ondansetron Injectable 4 milliGRAM(s) IV Push once PRN Nausea and/or Vomiting  oxyCODONE    IR 5 milliGRAM(s) Oral every 6 hours PRN Severe Pain (7 - 10)      PHYSICAL EXAM:  GENERAL: NAD, well-developed  HEAD:  Atraumatic, Normocephalic  EYES: EOMI,  conjunctiva and sclera clear  NECK: Supple, No JVD  CHEST/LUNG: Clear to auscultation bilaterally; No wheeze  HEART: Regular rate and rhythm; No murmurs, rubs, or gallops  ABDOMEN: Soft, Nontender, Nondistended; Bowel sounds present  EXTREMITIES:  2+ Peripheral Pulses, No clubbing, cyanosis, or edema  PSYCH: AAOx3  NEUROLOGY: non-focal  SKIN: No rashes or lesions                                                                  7.5    2.16  )-----------( 90       ( 09 Jan 2022 07:57 )             23.6               140|107|18<67  3.9|21|0.86  8.3,--,--  01-09 @ 07:37    CAPILLARY BLOOD GLUCOSE        CAPILLARY BLOOD GLUCOSE        RADIOLOGY & ADDITIONAL TESTS:    Imaging Personally Reviewed:    Consultant(s) Notes Reviewed:      Care Discussed with Consultants/Other Providers:

## 2022-01-09 NOTE — PROGRESS NOTE ADULT - ASSESSMENT
76F w/ HTN, HCV, cirrhosis, and HCC, 1/1/22 p/w abdominal pain    (1)TANNER - prerenal; resolved  (2)Hypokalemia - s/p repletion,  improved  (3)Hepatology - cirrhosis   (4)Hepatocellular CA - underwent IR mapping for possible bland embolization, possible y90 Mapping  last Friday- plan for Y90 next week by IR      RECOMMEND:  (1)Dose new meds for GFR 60ml/min  (2)BMP daily  (3) trend H/H and PRBC prn per primary team   76F w/ HTN, HCV, cirrhosis, and HCC, 1/1/22 p/w abdominal pain    (1)TANNER - prerenal; resolved  (2)Hypokalemia - s/p repletion,  improved  (3)Hepatology - cirrhosis   (4)Hepatocellular CA - underwent IR mapping for possible bland embolization, possible y90 Mapping  last Friday- plan for Y90 next week by IR      RECOMMEND:  (1)Dose new meds for GFR 60ml/min  (2)BMP daily  (3) trend H/H and PRBC prn per primary team  (4) encourage po intake as tolerable

## 2022-01-09 NOTE — PROGRESS NOTE ADULT - SUBJECTIVE AND OBJECTIVE BOX
Patient seen and examined.  No SOb or no pain. NAD Noted    REVIEW OF SYSTEMS:  As per HPI, otherwise 8 full 10 ROS were unremarkable    MEDICATIONS  (STANDING):  furosemide    Tablet 20 milliGRAM(s) Oral daily  influenza  Vaccine (HIGH DOSE) 0.7 milliLiter(s) IntraMuscular once  sodium chloride 0.9%. 500 milliLiter(s) (30 mL/Hr) IV Continuous <Continuous>  sodium chloride 0.9%. 1000 milliLiter(s) (65 mL/Hr) IV Continuous <Continuous>  spironolactone 25 milliGRAM(s) Oral daily      VITAL:  T(C): , Max: 37.1 (01-09-22 @ 04:28)  T(F): , Max: 98.8 (01-09-22 @ 04:28)  HR: 98 (01-09-22 @ 13:15)  BP: 109/69 (01-09-22 @ 13:15)  BP(mean): --  RR: 17 (01-09-22 @ 13:15)  SpO2: 97% (01-09-22 @ 13:15)  Wt(kg): --    I and O's:    01-08 @ 07:01  -  01-09 @ 07:00  --------------------------------------------------------  IN: 1180 mL / OUT: 0 mL / NET: 1180 mL    01-09 @ 07:01  -  01-09 @ 15:12  --------------------------------------------------------  IN: 440 mL / OUT: 0 mL / NET: 440 mL          PHYSICAL EXAM:    Constitutional: NAD  Neck:  No JVD  Respiratory: CTA-b/l  Cardiovascular: RRR s1s2, no m/r/g  Gastrointestinal: BS+, soft, NT, (+)distension  Extremities: No peripheral edema b/l  Neurological: no focal deficits; strength grossly intact      LABS:                        7.5    2.16  )-----------( 90       ( 09 Jan 2022 07:57 )             23.6     01-09    140  |  107  |  18  ----------------------------<  67<L>  3.9   |  21<L>  |  0.86    Ca    8.3<L>      09 Jan 2022 07:37       Patient seen and examined.  No SOb or no pain. NAD Noted.  patient said "I started eating today."    REVIEW OF SYSTEMS:  As per HPI, otherwise 8 full 10 ROS were unremarkable    MEDICATIONS  (STANDING):  furosemide    Tablet 20 milliGRAM(s) Oral daily  influenza  Vaccine (HIGH DOSE) 0.7 milliLiter(s) IntraMuscular once  sodium chloride 0.9%. 500 milliLiter(s) (30 mL/Hr) IV Continuous <Continuous>  sodium chloride 0.9%. 1000 milliLiter(s) (65 mL/Hr) IV Continuous <Continuous>  spironolactone 25 milliGRAM(s) Oral daily      VITAL:  T(C): , Max: 37.1 (01-09-22 @ 04:28)  T(F): , Max: 98.8 (01-09-22 @ 04:28)  HR: 98 (01-09-22 @ 13:15)  BP: 109/69 (01-09-22 @ 13:15)  BP(mean): --  RR: 17 (01-09-22 @ 13:15)  SpO2: 97% (01-09-22 @ 13:15)  Wt(kg): --    I and O's:    01-08 @ 07:01  -  01-09 @ 07:00  --------------------------------------------------------  IN: 1180 mL / OUT: 0 mL / NET: 1180 mL    01-09 @ 07:01  -  01-09 @ 15:12  --------------------------------------------------------  IN: 440 mL / OUT: 0 mL / NET: 440 mL          PHYSICAL EXAM:    Constitutional: NAD  Neck:  No JVD  Respiratory: CTA-b/l  Cardiovascular: RRR s1s2, no m/r/g  Gastrointestinal: BS+, soft, NT, (+)distension  Extremities: No peripheral edema b/l  Neurological: no focal deficits; strength grossly intact      LABS:                        7.5    2.16  )-----------( 90       ( 09 Jan 2022 07:57 )             23.6     01-09    140  |  107  |  18  ----------------------------<  67<L>  3.9   |  21<L>  |  0.86    Ca    8.3<L>      09 Jan 2022 07:37

## 2022-01-10 NOTE — PROGRESS NOTE ADULT - SUBJECTIVE AND OBJECTIVE BOX
Overnight events noted      VITAL:  T(C): , Max: 37.3 (01-10-22 @ 04:47)  T(F): , Max: 99.2 (01-10-22 @ 04:47)  HR: 86 (01-10-22 @ 08:06)  BP: 121/73 (01-10-22 @ 08:06)  BP(mean): --  RR: 17 (01-10-22 @ 08:06)  SpO2: 98% (01-10-22 @ 08:06)      PHYSICAL EXAM:  Constitutional: NAD, Alert  HEENT: NCAT, MMM  Neck: Supple, No JVD  Respiratory: CTA-b/l  Cardiovascular: RRR s1s2, no m/r/g  Gastrointestinal: BS+, soft, NT, (+)distension  Extremities: No peripheral edema b/l  Neurological: no focal deficits; strength grossly intact  Back: no CVAT b/l  Skin: No rashes, no nevi    LABS:                        8.4    2.82  )-----------( 105      ( 10 Nathan 2022 11:35 )             26.4     Na(137)/K(3.7)/Cl(102)/HCO3(22)/BUN(16)/Cr(0.83)Glu(87)/Ca(8.5)/Mg(--)/PO4(--)    01-10 @ 11:35  Na(140)/K(3.9)/Cl(107)/HCO3(21)/BUN(18)/Cr(0.86)Glu(67)/Ca(8.3)/Mg(--)/PO4(--)    01-09 @ 07:37  Na(140)/K(3.6)/Cl(109)/HCO3(20)/BUN(18)/Cr(0.89)Glu(80)/Ca(8.0)/Mg(--)/PO4(--)    01-08 @ 06:07      IMPRESSION: 76F w/ HTN, HCV, cirrhosis, and HCC, 1/1/22 p/w abdominal pain    (1)TANNER - prerenal; resolved  (2)Hypokalemia - improved s/p repletion  (3)Hepatology - cirrhosis   (4)Hepatocellular CA - potentially today for IR-guided embolization     RECOMMEND:  (1)Dose new meds for GFR 60ml/min  (2)BMP daily  (3)No renal objection to proceeding with embolization   (4)Reconsult as needed          Ganesh Guidry MD  [facility]  Office: [phone]  Cell: [phone]       (+)abdominal pain. No SOB      VITAL:  T(C): , Max: 37.3 (01-10-22 @ 04:47)  T(F): , Max: 99.2 (01-10-22 @ 04:47)  HR: 86 (01-10-22 @ 08:06)  BP: 121/73 (01-10-22 @ 08:06)  BP(mean): --  RR: 17 (01-10-22 @ 08:06)  SpO2: 98% (01-10-22 @ 08:06)      PHYSICAL EXAM:  Constitutional: Frail; thin to cachectic  HEENT: NCAT, DMM  Neck: Supple, No JVD  Respiratory: CTA-b/l  Cardiovascular: RRR s1s2, no m/r/g  Gastrointestinal: BS+, soft, NT, (+)distension  Extremities: No peripheral edema b/l  Neurological: no focal deficits; strength grossly intact  Back: no CVAT b/l  Skin: No rashes, no nevi    LABS:                        8.4    2.82  )-----------( 105      ( 10 Nathan 2022 11:35 )             26.4     Na(137)/K(3.7)/Cl(102)/HCO3(22)/BUN(16)/Cr(0.83)Glu(87)/Ca(8.5)/Mg(--)/PO4(--)    01-10 @ 11:35  Na(140)/K(3.9)/Cl(107)/HCO3(21)/BUN(18)/Cr(0.86)Glu(67)/Ca(8.3)/Mg(--)/PO4(--)    01-09 @ 07:37  Na(140)/K(3.6)/Cl(109)/HCO3(20)/BUN(18)/Cr(0.89)Glu(80)/Ca(8.0)/Mg(--)/PO4(--)    01-08 @ 06:07      IMPRESSION: 76F w/ HTN, HCV, cirrhosis, and HCC, 1/1/22 p/w abdominal pain    (1)TANNER - prerenal; resolved  (2)Hypokalemia - improved s/p repletion  (3)Hepatology - cirrhosis   (4)Hepatocellular CA - potentially today for IR-guided embolization     RECOMMEND:  (1)Dose new meds for GFR 60ml/min  (2)BMP daily  (3)No renal objection to proceeding with embolization   (4)Reconsult as needed          Ganesh Guidry MD  [facility]  Office: [phone]  Cell: [phone]

## 2022-01-10 NOTE — CHART NOTE - NSCHARTNOTEFT_GEN_A_CORE
-tentative plan for EGD for EV screening 1/4  -please order labs (CBC, CMP, INR) for 3 AM so that results will be available early tomorrow morning; replete lytes and transfuse as needed  -make NPO after midnight tonight    Selin Diaz MD  GI Fellow, PGY-4  Available via Microsoft Teams    NON-URGENT CONSULTS:  Please email [email or web address]  AT NIGHT AND ON WEEKENDS:  Contact on-call GI fellow via answering service ([phone]) from 5pm-8am and on weekends/holidays  MONDAY-FRIDAY 8AM-5PM:  Pager# [phone] ([facility]) or [phone] ([facility])  GI Phone# [phone] ([facility])
Attempted to see patient but off floor.  Likely follow up 1/4. [phone]
Pt is complaining of worsening abdominal distension and pain.  Hepatology had asked that her Lasix 20 mg daily be increased to 40mg daily and her Spironolactone 25mg daily be increased to 100mg daily.  In order to give patient relief most likely from her ascites and increased abdominal girth causing pain, will given additional diuretics now.      A/P:  Lasix 20mg po now x 1  Spironolactone 25 mg PO now x 1  AM doses increased  Abd US ordered per Hepatology
Attempted to see patient, she was out of room at endoscopy  Will follow up likely 1/5 if patient available. Dr. Carreno updated and treatment options being explored at this time given new findings.  [phone]

## 2022-01-10 NOTE — PROGRESS NOTE ADULT - ASSESSMENT
76y Female with pmhx of Hep C cirrhosis, HCC s/p IR embolization, HTN, complaining of RUQ abdominal pain for the last 3 weeks.       IR re consult fro Embolization   Ascites Malignancy related GI/ Hetology following    IR following s/p paracentesis   Advance diet as tolerated   HCC Hepatology following  Mapping done for IR [place] embolization   s/ p EGD     Anemia in the setting of Cirrhosis and HCC- Transfuse prn.   Monitor     Increase Lasix and Spirinolactone.

## 2022-01-10 NOTE — PROGRESS NOTE ADULT - ASSESSMENT
76y Female with pmhx of Hep C cirrhosis, HCC s/p SIRT 11/4/2020, HTN, complaining of RUQ abdominal pain for the last 3 weeks. s/p EGD 1/4/22- PHG, no varices, mild schatzki ring, gastric polyps, duodenal lipoma; plan for Y90 of HCC lesion    #Decompensated HCV cirrhosis (GT1a) c/b ascites, thrombocytopenia  #R lobe HCC in seg 6/7 (dx 08/2020, s/p R lobe SIRT 11/2020 now with recent disease progression)  Dx HCV in 1990's (exposure thought 2/2 blood transfusion in 1988 s/p MCV or exposure to unsterile tx from injection for pain control). She reports taking medication previously, but does not recall the exact treatment. Referred to liver clinic 08/2020- w/u showed fibroscan - 19.9 kPa suggestive of cirrhosis, , MRI and US w/ 7.5 cm lesion in R hepatic lob seg 6/7 c/w HCC (LI-RADS 5). No e/o met disease on bone scans EGD 9/30/2020- Small (< 5 mm) EV, GAVE w/o bleeding, gastric polyps s/p bx, nodule in duodenum s/p bx. C-scope 9/30/2020- Internal hemorrhoids, no specimens collected. s/p IR random liver bx 10/2020 with HVPG 11 mmHg and path w/ chronic HCV and cirrhosis mild steatohepatitis. s/p IR R lobe SIRT 11/2020. F/u MR abd 02/2021 showing decreased areas of internal enhancement c/w SIRT tx. Was lost to f/u a few months after SIRT. 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 Per outpt IR note from Dr. Ramirez, Given size of lesions and that she is not a surgical candidate, recommending radioembolization as palliative treatment.    -Ascites: large volume ascites on CT A/P this admission; s/p para 1/3 with 3.6 L removed  -Varices: EGD 1/4/22- PHG, no varices  -HE: none on exam    #c/f bleeding from HCC lesion- found to have Hgb drop 13 -> 7.5 in 2 months; s/p 1 u pRBC; Hgb stable at ~8 (BL 13); CTA A/P large confluent masses of partially necrotic HCC in R liver lobe w/ liver capsule involvement in seg 7/8, no active contrast extravasate to suggest active bleeding, liver cirrhosis with portal HTN, additional HCC in L hepatic lobe  -1/7- IR hepatic arteriogram, possible bland embolization, y90 Mapping     Recommendations:  -trend CBC, CMP, INR  -appreciate IR, palliative care and oncology recs  -would be helpful if pall care could discuss GOC with pt  -increase diuretics: lasix 20 ->40 mg daily + spironolactone 25 -> 100 mg daily  -monitor I/Os  -order US abd limited to eval for drainable ascites given pt reporting worsening distension  -plan for Y90 next week by INDU Diaz MD  GI Fellow, PGY-4  Available via [employer] Teams    NON-URGENT CONSULTS:  Please email [email or web address]  AT NIGHT AND ON WEEKENDS:  Contact on-call GI fellow via answering service ([phone]) from 5pm-8am and on weekends/holidays  MONDAY-FRIDAY 8AM-5PM:  Pager# [phone] ([facility]) or [phone] ([facility])  GI Phone# [phone] ([facility])

## 2022-01-10 NOTE — PROGRESS NOTE ADULT - SUBJECTIVE AND OBJECTIVE BOX
Chief Complaint:  Patient is a 76y old  Female who presents with a chief complaint of abdominal pain (07 Jan 2022 11:34)      Interval Events: Reports feeling well but has a poor appetite. States the abdominal distension is worsening compared to last week.   Denies any N/V/D/C.  Spoke with IR, plan for Y90 next week.    Hospital Medications:  furosemide    Tablet 20 milliGRAM(s) Oral daily  influenza  Vaccine (HIGH DOSE) 0.7 milliLiter(s) IntraMuscular once  ondansetron Injectable 4 milliGRAM(s) IV Push once PRN  oxyCODONE    IR 5 milliGRAM(s) Oral every 6 hours PRN  sodium chloride 0.9%. 500 milliLiter(s) IV Continuous <Continuous>  sodium chloride 0.9%. 1000 milliLiter(s) IV Continuous <Continuous>  spironolactone 25 milliGRAM(s) Oral daily      PMHX/PSHX:  Hypertension, unspecified type    Thrombocytopenia    Hepatic cirrhosis, unspecified hepatic cirrhosis type, unspecified whether ascites present    Vaginal cyst            ROS: 14 point ROS negative unless otherwise stated in subjective      PHYSICAL EXAM:     GENERAL:  No acute distress  HEENT:  NCAT, no scleral icterus  CHEST: no resp distress, NWOB, +R basilar crackles  HEART:  RRR, +2/6 systolic murmur best heard at LUSB  ABDOMEN:  Soft, non-tender, +mild-moderately distended, +bowel sounds, no masses  EXTREMITIES:  No cyanosis, clubbing, or edema  SKIN:  No rash/erythema/ecchymoses/petechiae/wounds/abscess/warm/dry  NEURO:  Alert and oriented x 3, no asterixis, no tremor    Vital Signs:  Vital Signs Last 24 Hrs  T(C): 36.3 (10 Nathan 2022 08:06), Max: 37.3 (10 Nathan 2022 04:47)  T(F): 97.4 (10 Nathan 2022 08:06), Max: 99.2 (10 Nathan 2022 04:47)  HR: 86 (10 Nathan 2022 08:06) (82 - 95)  BP: 121/73 (10 Nathan 2022 08:06) (109/70 - 123/73)  BP(mean): --  RR: 17 (10 Nathan 2022 08:06) (17 - 17)  SpO2: 98% (10 Nathan 2022 08:06) (98% - 98%)  Daily     Daily     LABS:                        8.4    2.82  )-----------( 105      ( 10 Nathan 2022 11:35 )             26.4     01-10    137  |  102  |  16  ----------------------------<  87  3.7   |  22  |  0.83    Ca    8.5      10 Nathan 2022 11:35                Imaging: No new abdominal imaging               Chief Complaint:  Patient is a 76y old  Female who presents with a chief complaint of abdominal pain (07 Jan 2022 11:34)      Interval Events: Reports feeling well but has a poor appetite. States the abdominal distension is worsening compared to last week.   Denies any N/V/D/C.  Spoke with IR, plan for Y90 next week.    Hospital Medications:  furosemide    Tablet 20 milliGRAM(s) Oral daily  influenza  Vaccine (HIGH DOSE) 0.7 milliLiter(s) IntraMuscular once  ondansetron Injectable 4 milliGRAM(s) IV Push once PRN  oxyCODONE    IR 5 milliGRAM(s) Oral every 6 hours PRN  sodium chloride 0.9%. 500 milliLiter(s) IV Continuous <Continuous>  sodium chloride 0.9%. 1000 milliLiter(s) IV Continuous <Continuous>  spironolactone 25 milliGRAM(s) Oral daily      PMHX/PSHX:  Hypertension, unspecified type    Thrombocytopenia    Hepatic cirrhosis, unspecified hepatic cirrhosis type, unspecified whether ascites present    Vaginal cyst            ROS: 14 point ROS negative unless otherwise stated in subjective      PHYSICAL EXAM:     GENERAL:  No acute distress  HEENT:  NCAT, no scleral icterus  CHEST: no resp distress, NWOB, +R basilar crackles  HEART:  RRR, +2/6 systolic murmur best heard at LUSB  ABDOMEN:  Soft, non-tender, ++distended, +bowel sounds, no masses  EXTREMITIES:  No cyanosis, clubbing, or edema  SKIN:  No rash/erythema/ecchymoses/petechiae/wounds/abscess/warm/dry  NEURO:  Alert and oriented x 3, no asterixis, no tremor    Vital Signs:  Vital Signs Last 24 Hrs  T(C): 36.3 (10 Nathan 2022 08:06), Max: 37.3 (10 Nathan 2022 04:47)  T(F): 97.4 (10 Nathan 2022 08:06), Max: 99.2 (10 Nathan 2022 04:47)  HR: 86 (10 Nathan 2022 08:06) (82 - 95)  BP: 121/73 (10 Nathan 2022 08:06) (109/70 - 123/73)  BP(mean): --  RR: 17 (10 Nathan 2022 08:06) (17 - 17)  SpO2: 98% (10 Nathan 2022 08:06) (98% - 98%)  Daily     Daily     LABS:                        8.4    2.82  )-----------( 105      ( 10 Nathan 2022 11:35 )             26.4     01-10    137  |  102  |  16  ----------------------------<  87  3.7   |  22  |  0.83    Ca    8.5      10 Nathan 2022 11:35                Imaging: No new abdominal imaging

## 2022-01-10 NOTE — PROGRESS NOTE ADULT - SUBJECTIVE AND OBJECTIVE BOX
Patient is a 76y old  Female who presents with a chief complaint of     SUBJECTIVE / OVERNIGHT EVENTS: no events over night     T(C): 37.1 (01-10-22 @ 18:10), Max: 37.1 (01-10-22 @ 18:10)  HR: 99 (01-10-22 @ 18:10) (64 - 99)  BP: 117/81 (01-10-22 @ 18:10) (112/61 - 117/81)  RR: 17 (01-10-22 @ 18:10) (17 - 17)  SpO2: 99% (01-10-22 @ 18:10) (99% - 99%)      MEDICATIONS  (STANDING):  furosemide    Tablet 40 milliGRAM(s) Oral daily  influenza  Vaccine (HIGH DOSE) 0.7 milliLiter(s) IntraMuscular once  sodium chloride 0.9%. 500 milliLiter(s) (30 mL/Hr) IV Continuous <Continuous>  sodium chloride 0.9%. 1000 milliLiter(s) (65 mL/Hr) IV Continuous <Continuous>  spironolactone 100 milliGRAM(s) Oral daily    MEDICATIONS  (PRN):  ondansetron Injectable 4 milliGRAM(s) IV Push once PRN Nausea and/or Vomiting  oxyCODONE    IR 5 milliGRAM(s) Oral every 6 hours PRN Severe Pain (7 - 10)      PHYSICAL EXAM:  GENERAL: NAD, well-developed  HEAD:  Atraumatic, Normocephalic  EYES: EOMI,  conjunctiva and sclera clear  NECK: Supple, No JVD  CHEST/LUNG: Clear to auscultation bilaterally; No wheeze  HEART: Regular rate and rhythm; No murmurs, rubs, or gallops  ABDOMEN: Soft, Nontender, Nondistended; Bowel sounds present  EXTREMITIES:  2+ Peripheral Pulses, No clubbing, cyanosis, or edema  PSYCH: AAOx3  NEUROLOGY: non-focal  SKIN: No rashes or lesions                                                                                        8.4    2.82  )-----------( 105      ( 10 Nathan 2022 11:35 )             26.4               137|102|16<87  3.7|22|0.83  8.5,--,--  01-10 @ 11:35    CAPILLARY BLOOD GLUCOSE            CAPILLARY BLOOD GLUCOSE        RADIOLOGY & ADDITIONAL TESTS:    Imaging Personally Reviewed:    Consultant(s) Notes Reviewed:      Care Discussed with Consultants/Other Providers:

## 2022-01-11 NOTE — PROGRESS NOTE ADULT - ASSESSMENT
76y Female with pmhx of Hep C cirrhosis, HCC s/p IR embolization, HTN, complaining of RUQ abdominal pain for the last 3 weeks.       IR re consult fro Embolization   Ascites Malignancy related GI/ Hetology following    IR following s/p paracentesis   Advance diet as tolerated   HCC Hepatology following  Mapping done for IR [place] embolization   s/ p EGD     Anemia in the setting of Cirrhosis and HCC- Transfuse prn.   Monitor     Increase Lasix and Spirinolactone.

## 2022-01-11 NOTE — PROGRESS NOTE ADULT - NUTRITIONAL ASSESSMENT
This patient has been assessed with a concern for Malnutrition and has been determined to have a diagnosis/diagnoses of Severe protein-calorie malnutrition.    This patient is being managed with:   Diet Regular-  DASH/TLC {Sodium & Cholesterol Restricted} (DASH)  Supplement Feeding Modality:  Oral  Ensure Enlive Cans or Servings Per Day:  1       Frequency:  Two Times a day  Entered: Jan 11 2022 11:38AM

## 2022-01-11 NOTE — CONSULT NOTE ADULT - CONSULT REQUESTED DATE/TIME
04-Jan-2022
03-Jan-2022 11:32
11-Jan-2022 16:20
02-Jan-2022
02-Jan-2022 12:04
05-Jan-2022
02-Jan-2022 18:06

## 2022-01-11 NOTE — DIETITIAN INITIAL EVALUATION ADULT. - ADD RECOMMEND
1) Recommend change diet to low sodium. 2) Recommend addition of Ensure Enlive 2x daily. 3) Encourage intake of protein-rich, nutrient dense foods. 4) Malnutrition alert placed in EMR.

## 2022-01-11 NOTE — CONSULT NOTE ADULT - SUBJECTIVE AND OBJECTIVE BOX
Interventional Radiology Inpatient Consult Note       HPI:  76y Female with pmhx of Hep C cirrhosis, HCC s/p IR embolization, HTN, complaining of RUQ abdominal pain for the last 3 weeks.     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     Patient denies fevers/ reports nausea..  (02 Jan 2022 00:08)      Vital Signs: Vital Signs Last 24 Hrs  T(C): 36.4 (11 Jan 2022 15:59), Max: 37.1 (10 Nathan 2022 18:10)  T(F): 97.5 (11 Jan 2022 15:59), Max: 98.8 (10 Nathan 2022 18:10)  HR: 85 (11 Jan 2022 15:59) (85 - 99)  BP: 124/67 (11 Jan 2022 15:59) (116/74 - 127/71)  BP(mean): --  RR: 18 (11 Jan 2022 15:59) (17 - 18)  SpO2: 96% (11 Jan 2022 15:59) (96% - 100%)    Past Medical/ Surgical History: PAST MEDICAL & SURGICAL HISTORY:  Hypertension, unspecified type    Thrombocytopenia    Hepatic cirrhosis, unspecified hepatic cirrhosis type, unspecified whether ascites present    Vaginal cyst        Allergies: Allergies    No Known Allergies    Intolerances        Medications: MEDICATIONS  (STANDING):  furosemide    Tablet 40 milliGRAM(s) Oral daily  influenza  Vaccine (HIGH DOSE) 0.7 milliLiter(s) IntraMuscular once  sodium chloride 0.9%. 500 milliLiter(s) (30 mL/Hr) IV Continuous <Continuous>  sodium chloride 0.9%. 1000 milliLiter(s) (65 mL/Hr) IV Continuous <Continuous>  spironolactone 100 milliGRAM(s) Oral daily    MEDICATIONS  (PRN):  ondansetron Injectable 4 milliGRAM(s) IV Push once PRN Nausea and/or Vomiting  oxyCODONE    IR 5 milliGRAM(s) Oral every 6 hours PRN Severe Pain (7 - 10)      SOCIAL HISTORY:    FAMILY HISTORY:        PHYSICAL EXAM:    General:   Well-groomed, well-nourished, in no distress  Lungs:  CTA bilaterally  Cardiovascular:   S1, S2,   Abdomen:  Soft, non-tender, non-distended,   Extremities:  no calf tenderness/swelling bilaterally  Musculoskeletal:  Full ROM in all joints w/o swelling/tenderness/effusion  Neuro/Psych:  A &O x 3    LABS:                        8.5    2.87  )-----------( 111      ( 11 Jan 2022 13:19 )             26.3     01-10    137  |  102  |  16  ----------------------------<  87  3.7   |  22  |  0.83    Ca    8.5      10 Nathan 2022 11:35    TPro  7.5  /  Alb  2.9<L>  /  TBili  2.9<H>  /  DBili  1.6<H>  /  AST  390<H>  /  ALT  87<H>  /  AlkPhos  81  01-11    PT/INR - ( 11 Jan 2022 13:19 )   PT: 17.1 sec;   INR: 1.45 ratio         PTT - ( 11 Jan 2022 13:19 )  PTT:30.6 sec      RADIOLOGY & ADDITIONAL STUDIES:    < from: US Abdomen Limited (01.11.22 @ 15:23) >    ACC: [id number] EXAM:  US ABDOMEN LIMITED                          PROCEDURE DATE:  01/11/2022          INTERPRETATION:  CLINICAL INFORMATION: 76-year-old with liver mass.   Assess ascites    Focused ultrasound examination of the abdomen reveals moderate to severe   ascites.    IMPRESSION: Moderate to severe ascites.          --- End of Report ---            EMILY HALL MD; Attending Radiologist  This document has been electronically signed. Jan 11 2022  4:01PM    < end of copied text >

## 2022-01-11 NOTE — PROGRESS NOTE ADULT - SUBJECTIVE AND OBJECTIVE BOX
Chief Complaint:  Patient is a 76y old  Female who presents with a chief complaint of abdominal pain (07 Jan 2022 11:34)      Interval Events: Reports feeling well. Denies any N/V/D/C, abd pain, melena or hematochezia.      Hospital Medications:  furosemide    Tablet 40 milliGRAM(s) Oral daily  influenza  Vaccine (HIGH DOSE) 0.7 milliLiter(s) IntraMuscular once  ondansetron Injectable 4 milliGRAM(s) IV Push once PRN  oxyCODONE    IR 5 milliGRAM(s) Oral every 6 hours PRN  sodium chloride 0.9%. 500 milliLiter(s) IV Continuous <Continuous>  sodium chloride 0.9%. 1000 milliLiter(s) IV Continuous <Continuous>  spironolactone 100 milliGRAM(s) Oral daily      PMHX/PSHX:  Hypertension, unspecified type    Thrombocytopenia    Hepatic cirrhosis, unspecified hepatic cirrhosis type, unspecified whether ascites present    Vaginal cyst            ROS: 14 point ROS negative unless otherwise stated in subjective      PHYSICAL EXAM:     GENERAL:  No acute distress  HEENT:  NCAT, no scleral icterus  CHEST: no resp distress, NWOB, +R basilar crackles  HEART:  RRR, +2/6 systolic murmur best heard at LUSB  ABDOMEN:  Soft, non-tender, ++distended, +bowel sounds, no masses  EXTREMITIES:  No cyanosis, clubbing, or edema  SKIN:  No rash/erythema/ecchymoses/petechiae/wounds/abscess/warm/dry  NEURO:  Alert and oriented x 3, no asterixis, no tremor    Vital Signs:  Vital Signs Last 24 Hrs  T(C): 37 (11 Jan 2022 04:53), Max: 37.1 (10 Nathan 2022 18:10)  T(F): 98.6 (11 Jan 2022 04:53), Max: 98.8 (10 Nathan 2022 18:10)  HR: 90 (11 Jan 2022 04:53) (64 - 99)  BP: 127/71 (11 Jan 2022 04:53) (112/61 - 127/71)  BP(mean): --  RR: 17 (11 Jan 2022 04:53) (17 - 17)  SpO2: 100% (11 Jan 2022 04:53) (99% - 100%)  Daily     Daily     LABS:                        8.4    2.82  )-----------( 105      ( 10 Nathan 2022 11:35 )             26.4     01-10    137  |  102  |  16  ----------------------------<  87  3.7   |  22  |  0.83    Ca    8.5      10 Nathan 2022 11:35                Imaging:  No new abdominal imaging             Chief Complaint:  Patient is a 76y old  Female who presents with a chief complaint of abdominal pain (07 Jan 2022 11:34)      Interval Events:   Reports feeling well. Diuretic regimen was increased yesterday and today and pt reports some improvement in abdominal distension.  Still reporting poor appetite.  Denies any N/V/D/C, abd pain, melena or hematochezia.      Hospital Medications:  furosemide    Tablet 40 milliGRAM(s) Oral daily  influenza  Vaccine (HIGH DOSE) 0.7 milliLiter(s) IntraMuscular once  ondansetron Injectable 4 milliGRAM(s) IV Push once PRN  oxyCODONE    IR 5 milliGRAM(s) Oral every 6 hours PRN  sodium chloride 0.9%. 500 milliLiter(s) IV Continuous <Continuous>  sodium chloride 0.9%. 1000 milliLiter(s) IV Continuous <Continuous>  spironolactone 100 milliGRAM(s) Oral daily      PMHX/PSHX:  Hypertension, unspecified type    Thrombocytopenia    Hepatic cirrhosis, unspecified hepatic cirrhosis type, unspecified whether ascites present    Vaginal cyst            ROS: 14 point ROS negative unless otherwise stated in subjective      PHYSICAL EXAM:     GENERAL:  No acute distress  HEENT:  NCAT, no scleral icterus  CHEST: no resp distress, NWOB, CTAB  HEART:  RRR, +2/6 systolic murmur best heard at LUSB  ABDOMEN:  Soft, non-tender, +mild-moderately distended, +bowel sounds, no masses  EXTREMITIES:  No cyanosis, clubbing, or edema  SKIN:  No rash/erythema/ecchymoses/petechiae/wounds/abscess/warm/dry  NEURO:  Alert and oriented x 3, no asterixis, no tremor    Vital Signs:  Vital Signs Last 24 Hrs  T(C): 37 (11 Jan 2022 04:53), Max: 37.1 (10 Nathan 2022 18:10)  T(F): 98.6 (11 Jan 2022 04:53), Max: 98.8 (10 Nathan 2022 18:10)  HR: 90 (11 Jan 2022 04:53) (64 - 99)  BP: 127/71 (11 Jan 2022 04:53) (112/61 - 127/71)  BP(mean): --  RR: 17 (11 Jan 2022 04:53) (17 - 17)  SpO2: 100% (11 Jan 2022 04:53) (99% - 100%)  Daily     Daily     LABS:                        8.4    2.82  )-----------( 105      ( 10 Nathan 2022 11:35 )             26.4     01-10    137  |  102  |  16  ----------------------------<  87  3.7   |  22  |  0.83    Ca    8.5      10 Nathan 2022 11:35                Imaging:  No new abdominal imaging             Chief Complaint:  Patient is a 76y old  Female who presents with a chief complaint of abdominal pain (07 Jan 2022 11:34)      Interval Events:   Reports feeling well. Diuretic regimen was increased yesterday and today and pt reports some improvement in abdominal distension.  Still reporting poor appetite.  Denies any N/V/D/C, abd pain, melena or hematochezia.      Hospital Medications:  furosemide    Tablet 40 milliGRAM(s) Oral daily  influenza  Vaccine (HIGH DOSE) 0.7 milliLiter(s) IntraMuscular once  ondansetron Injectable 4 milliGRAM(s) IV Push once PRN  oxyCODONE    IR 5 milliGRAM(s) Oral every 6 hours PRN  sodium chloride 0.9%. 500 milliLiter(s) IV Continuous <Continuous>  sodium chloride 0.9%. 1000 milliLiter(s) IV Continuous <Continuous>  spironolactone 100 milliGRAM(s) Oral daily      PMHX/PSHX:  Hypertension, unspecified type    Thrombocytopenia    Hepatic cirrhosis, unspecified hepatic cirrhosis type, unspecified whether ascites present    Vaginal cyst            ROS: 14 point ROS negative unless otherwise stated in subjective      PHYSICAL EXAM:     GENERAL:  No acute distress  HEENT:  NCAT, no scleral icterus  CHEST: no resp distress, NWOB, CTAB  HEART:  RRR, +2/6 systolic murmur best heard at LUSB  ABDOMEN:  Soft, non-tender, +mild-moderately distended, +bowel sounds, no masses  EXTREMITIES:  No cyanosis, clubbing, or edema  SKIN:  No rash/erythema/ecchymoses/petechiae/wounds/abscess/warm/dry  NEURO:  Alert and oriented x 3, no asterixis, no tremor    Vital Signs:  Vital Signs Last 24 Hrs  T(C): 37 (11 Jan 2022 04:53), Max: 37.1 (10 Nathan 2022 18:10)  T(F): 98.6 (11 Jan 2022 04:53), Max: 98.8 (10 Nathan 2022 18:10)  HR: 90 (11 Jan 2022 04:53) (64 - 99)  BP: 127/71 (11 Jan 2022 04:53) (112/61 - 127/71)  BP(mean): --  RR: 17 (11 Jan 2022 04:53) (17 - 17)  SpO2: 100% (11 Jan 2022 04:53) (99% - 100%)  Daily     Daily     LABS:                        8.4    2.82  )-----------( 105      ( 10 Nathan 2022 11:35 )             26.4     01-10    137  |  102  |  16  ----------------------------<  87  3.7   |  22  |  0.83    Ca    8.5      10 Nathan 2022 11:35                Imaging:        ACC: [id number] EXAM:  US ABDOMEN LIMITED                          PROCEDURE DATE:  01/11/2022          INTERPRETATION:  CLINICAL INFORMATION: 76-year-old with liver mass.   Assess ascites    Focused ultrasound examination of the abdomen reveals moderate to severe   ascites.    IMPRESSION: Moderate to severe ascites.

## 2022-01-11 NOTE — CONSULT NOTE ADULT - PROVIDER SPECIALTY LIST ADULT
Heme/Onc
Hepatology
Intervent Radiology
Intervent Radiology
Nephrology
Palliative Care
Intervent Radiology
[Negative] : Allergic/Immunologic

## 2022-01-11 NOTE — PROGRESS NOTE ADULT - SUBJECTIVE AND OBJECTIVE BOX
Patient is a 76y old  Female who presents with a chief complaint of     SUBJECTIVE / OVERNIGHT EVENTS: no events over night     T(C): 36.8 (01-11-22 @ 20:21), Max: 36.8 (01-11-22 @ 20:21)  HR: 91 (01-11-22 @ 20:21) (85 - 93)  BP: 121/80 (01-11-22 @ 20:21) (116/74 - 124/67)  RR: 18 (01-11-22 @ 20:21) (17 - 18)  SpO2: 98% (01-11-22 @ 20:21) (96% - 98%)      MEDICATIONS  (STANDING):  furosemide    Tablet 40 milliGRAM(s) Oral daily  influenza  Vaccine (HIGH DOSE) 0.7 milliLiter(s) IntraMuscular once  sodium chloride 0.9%. 500 milliLiter(s) (30 mL/Hr) IV Continuous <Continuous>  sodium chloride 0.9%. 1000 milliLiter(s) (65 mL/Hr) IV Continuous <Continuous>  spironolactone 100 milliGRAM(s) Oral daily    MEDICATIONS  (PRN):  ondansetron Injectable 4 milliGRAM(s) IV Push once PRN Nausea and/or Vomiting  oxyCODONE    IR 5 milliGRAM(s) Oral every 6 hours PRN Severe Pain (7 - 10)  PHYSICAL EXAM:  GENERAL: NAD, well-developed  HEAD:  Atraumatic, Normocephalic  EYES: EOMI,  conjunctiva and sclera clear  NECK: Supple, No JVD  CHEST/LUNG: Clear to auscultation bilaterally; No wheeze  HEART: Regular rate and rhythm; No murmurs, rubs, or gallops  ABDOMEN: Soft, Nontender, Nondistended; Bowel sounds present  EXTREMITIES:  2+ Peripheral Pulses, No clubbing, cyanosis, or edema  PSYCH: AAOx3  NEUROLOGY: non-focal  SKIN: No rashes or lesions                                                             8.5    2.87  )-----------( 111      ( 11 Jan 2022 13:19 )             26.3           LIVER FUNCTIONS - ( 11 Jan 2022 13:19 )  Alb: 2.9 g/dL / Pro: 7.5 g/dL / ALK PHOS: 81 U/L / ALT: 87 U/L / AST: 390 U/L / GGT: x           PT/INR - ( 11 Jan 2022 13:19 )   PT: 17.1 sec;   INR: 1.45 ratio         PTT - ( 11 Jan 2022 13:19 )  PTT:30.6 sec      CAPILLARY BLOOD GLUCOSE            CAPILLARY BLOOD GLUCOSE        RADIOLOGY & ADDITIONAL TESTS:    Imaging Personally Reviewed:    Consultant(s) Notes Reviewed:      Care Discussed with Consultants/Other Providers:

## 2022-01-11 NOTE — DIETITIAN INITIAL EVALUATION ADULT. - ORAL INTAKE PTA/DIET HISTORY
Pt reports at baseline she likes to eat but states due to worsening ascites her appetite and PO intake has been poor. Consumes regular diet at home with no restrictions. Reports intolerance to green peas (stomach upset, diarrhea). No micronutrient supplement. Does consume Ensure Shakes at home to supplement PO intake. Pt denies chewing/swallowing difficulty, nausea, vomiting, diarrhea, constipation.

## 2022-01-11 NOTE — CONSULT NOTE ADULT - CONSULT REASON
Azotemia
cancer
h/o HCV cirrhosis c/b HCC
GOC, advance care planning, symptom management
Paracentesis
Hx of HCC s/p IR empbolization for possible IR embolization and large ascites
Y90 Treatment

## 2022-01-11 NOTE — DIETITIAN INITIAL EVALUATION ADULT. - CHIEF COMPLAINT
This is a "76y Female with pmhx of Hep C cirrhosis, HCC s/p SIRT 11/4/2020, HTN, complaining of RUQ abdominal pain for the last 3 weeks. s/p EGD 1/4/22- PHG, no varices, mild schatzki ring, gastric polyps, duodenal lipoma; plan for Y90 of HCC lesion" Diuretic increased due to worsening ascites.

## 2022-01-11 NOTE — PROGRESS NOTE ADULT - ASSESSMENT
76y Female with pmhx of Hep C cirrhosis, HCC s/p SIRT 11/4/2020, HTN, complaining of RUQ abdominal pain for the last 3 weeks. s/p EGD 1/4/22- PHG, no varices, mild schatzki ring, gastric polyps, duodenal lipoma; plan for Y90 of HCC lesion    #Decompensated HCV cirrhosis (GT1a) c/b ascites, thrombocytopenia  #R lobe HCC in seg 6/7 (dx 08/2020, s/p R lobe SIRT 11/2020 now with recent disease progression)  Dx HCV in 1990's (exposure thought 2/2 blood transfusion in 1988 s/p MCV or exposure to unsterile tx from injection for pain control). She reports taking medication previously, but does not recall the exact treatment. Referred to liver clinic 08/2020- w/u showed fibroscan - 19.9 kPa suggestive of cirrhosis, , MRI and US w/ 7.5 cm lesion in R hepatic lob seg 6/7 c/w HCC (LI-RADS 5). No e/o met disease on bone scans EGD 9/30/2020- Small (< 5 mm) EV, GAVE w/o bleeding, gastric polyps s/p bx, nodule in duodenum s/p bx. C-scope 9/30/2020- Internal hemorrhoids, no specimens collected. s/p IR random liver bx 10/2020 with HVPG 11 mmHg and path w/ chronic HCV and cirrhosis mild steatohepatitis. s/p IR R lobe SIRT 11/2020. F/u MR abd 02/2021 showing decreased areas of internal enhancement c/w SIRT tx. Was lost to f/u a few months after SIRT. 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 Per outpt IR note from Dr. Ramirez, Given size of lesions and that she is not a surgical candidate, recommending radioembolization as palliative treatment.    -Ascites: large volume ascites on CT A/P this admission; s/p para 1/3 with 3.6 L removed  -Varices: EGD 1/4/22- PHG, no varices  -HE: none on exam    #c/f bleeding from HCC lesion- found to have Hgb drop 13 -> 7.5 in 2 months; s/p 1 u pRBC; Hgb stable at ~8 (BL 13); CTA A/P large confluent masses of partially necrotic HCC in R liver lobe w/ liver capsule involvement in seg 7/8, no active contrast extravasate to suggest active bleeding, liver cirrhosis with portal HTN, additional HCC in L hepatic lobe  -1/7- IR hepatic arteriogram, possible bland embolization, y90 Mapping     Recommendations:  -trend CBC, CMP, INR  -appreciate IR, palliative care and oncology recs  -would be helpful if pall care could discuss GOC with pt  -c/w diuretics: lasix 40 mg daily + spironolactone 100 mg daily  -monitor I/Os  -f/u US abd limited to eval for drainable ascites given pt reporting worsening distension  -plan for Y90 next week by INDU Diaz MD  GI Fellow, PGY-4  Available via Microsoft Teams    NON-URGENT CONSULTS:  Please email [email or web address]  AT NIGHT AND ON WEEKENDS:  Contact on-call GI fellow via answering service ([phone]) from 5pm-8am and on weekends/holidays  MONDAY-FRIDAY 8AM-5PM:  Pager# [phone] ([facility]) or [phone] ([facility])  GI Phone# [phone] ([facility])     76y Female with pmhx of Hep C cirrhosis, HCC s/p SIRT 11/4/2020, HTN, complaining of RUQ abdominal pain for the last 3 weeks. s/p EGD 1/4/22- PHG, no varices, mild schatzki ring, gastric polyps, duodenal lipoma; plan for Y90 of HCC lesion    #Decompensated HCV cirrhosis (GT1a) c/b ascites, thrombocytopenia  #R lobe HCC in seg 6/7 (dx 08/2020, s/p R lobe SIRT 11/2020 now with recent disease progression)  Dx HCV in 1990's (exposure thought 2/2 blood transfusion in 1988 s/p MCV or exposure to unsterile tx from injection for pain control). She reports taking medication previously, but does not recall the exact treatment. Referred to liver clinic 08/2020- w/u showed fibroscan - 19.9 kPa suggestive of cirrhosis, , MRI and US w/ 7.5 cm lesion in R hepatic lob seg 6/7 c/w HCC (LI-RADS 5). No e/o met disease on bone scans EGD 9/30/2020- Small (< 5 mm) EV, GAVE w/o bleeding, gastric polyps s/p bx, nodule in duodenum s/p bx. C-scope 9/30/2020- Internal hemorrhoids, no specimens collected. s/p IR random liver bx 10/2020 with HVPG 11 mmHg and path w/ chronic HCV and cirrhosis mild steatohepatitis. s/p IR R lobe SIRT 11/2020. F/u MR abd 02/2021 showing decreased areas of internal enhancement c/w SIRT tx. Was lost to f/u a few months after SIRT. 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 Per outpt IR note from Dr. Ramirez, Given size of lesions and that she is not a surgical candidate, recommending radioembolization as palliative treatment.    -Ascites: large volume ascites on CT A/P this admission; s/p para 1/3 with 3.6 L removed  -Varices: EGD 1/4/22- PHG, no varices  -HE: none on exam    #c/f bleeding from HCC lesion- found to have Hgb drop 13 -> 7.5 in 2 months; s/p 1 u pRBC; Hgb stable at ~8 (BL 13); CTA A/P large confluent masses of partially necrotic HCC in R liver lobe w/ liver capsule involvement in seg 7/8, no active contrast extravasate to suggest active bleeding, liver cirrhosis with portal HTN, additional HCC in L hepatic lobe  -1/7- IR hepatic arteriogram, possible bland embolization, y90 Mapping     Recommendations:  -trend CBC, CMP, INR  -appreciate IR, palliative care and oncology recs  -would be helpful if pall care could discuss GOC with pt  -c/w diuretics: lasix 40 mg daily + spironolactone 100 mg daily  -monitor I/Os  -mod-severe ascites on US abd limited 1/11; please consult IR for therapeutic para  -plan for Y90 next week by IR    Selin Diaz MD  GI Fellow, PGY-4  Available via Microsoft Teams    NON-URGENT CONSULTS:  Please email [email or web address]  [email or web address]  AT NIGHT AND ON WEEKENDS:  Contact on-call GI fellow via answering service ([phone]) from 5pm-8am and on weekends/holidays  MONDAY-FRIDAY 8AM-5PM:  Pager# [phone] ([facility]) or [phone] ([facility])  GI Phone# [phone] ([facility])

## 2022-01-11 NOTE — DIETITIAN INITIAL EVALUATION ADULT. - OTHER INFO
Weights: pt reports UBW as ~ 145lbs, unsure of what it has been recently due to ascites. Current dosing weight is 140lbs. Weight noted as 160lbs (11/4/20) per [place] HIDREA.     Since admission pt reports appetite and intake has been poor consuming < 50% of meals. Reports some improvement today with increased diuretic regimen but still suboptimal. . Discussed with patient importance of PO intake and prioritizing sources of protein to maintain lean body mass. Pt receptive to Ensure Enlive supplements to optimize PO intake. Encouraged intake of small frequent meals as able, leaving non-perishable items aside to consume between meal.  Pt reports understanding with no nutrition related questions at this time. Pt made aware RD remains available as needed. Weights: pt reports UBW as ~ 145lbs, unsure of what it has been recently due to ascites. Weight noted as 160lbs (11/4/20) per Tonja HIDREA. Current dosing weight is 140lbs. Suspect weight loss masked by fluid retention.     Since admission pt reports appetite and intake has been poor consuming < 50% of meals. Reports some improvement today with increased diuretic regimen but still suboptimal. No acute GI distress noted. Last BM 1/9.  Discussed with patient importance of PO intake and prioritizing sources of protein to maintain lean body mass. Pt receptive to Ensure Enlive supplements to optimize PO intake. Encouraged intake of small frequent meals as able, leaving non-perishable items aside to consume between meals. Diet education provide, written materials given as well.  Pt reports understanding with no nutrition related questions at this time. Pt made aware RD remains available as needed.

## 2022-01-11 NOTE — CONSULT NOTE ADULT - ASSESSMENT
76y Female with pmhx of Hep C cirrhosis, HCC s/p IR embolization, HTN, complaining of RUQ abdominal pain for the last 3 weeks. Pt received paracentesis on 1/3 by IR w/3.6L drained.     Reconsulted for additional therapeutic paracentesis.     Of note, the pt is undergoing w/u for SIRT w/Dr. Ramirez for palliative therapy for HCC with mapping done on 1/7.    Plan:  - Please place order for IR Procedure (paracentesis), approving attending Dr. Carreno  - hold therapeutic and prophylactic anticoagulants  - maintain active type and screen x2  - Please draw AM labs - CBC/INR/BMP  - Obtain repeat COVID-19 PCR; result required for procedure

## 2022-01-12 NOTE — PRE PROCEDURE NOTE - GENERAL PROCEDURE NAME
Hepatic Arteriogram, Possible Scurry Embolization, Possible Y90 Mapping
paracentesis
EGD
Paracentesis

## 2022-01-12 NOTE — PROGRESS NOTE ADULT - ASSESSMENT
76y Female with pmhx of Hep C cirrhosis, HCC s/p IR embolization, HTN, complaining of RUQ abdominal pain for the last 3 weeks.       IR re consult fro Embolization   Ascites Malignancy related GI/ Hetology following    IR following s/p paracentesis   Advance diet as tolerated   HCC Hepatology following  Mapping done for IR Sanders embolization   s/ p EGD     Anemia in the setting of Cirrhosis and HCC- Transfuse prn.   Monitor     Increase Lasix and Spirinolactone.

## 2022-01-12 NOTE — PRE PROCEDURE NOTE - PROCEDURE SERVICE
Endoscopy
Vascular and Interventional Radiology

## 2022-01-12 NOTE — PROCEDURE NOTE - NSPOSTCAREGUIDE_GEN_A_CORE
Instructed patient/caregiver to follow-up with primary care physician/Instructed patient/caregiver regarding signs and symptoms of infection/Keep the cast/splint/dressing clean and dry

## 2022-01-12 NOTE — PROGRESS NOTE ADULT - SUBJECTIVE AND OBJECTIVE BOX
Gastroenterology/Hepatology Progress Note      Interval Events:   - no acute events overnight  - plan for paracentesis this morning  - no new complaints     Allergies:  No Known Allergies      Hospital Medications:  furosemide    Tablet 40 milliGRAM(s) Oral daily  influenza  Vaccine (HIGH DOSE) 0.7 milliLiter(s) IntraMuscular once  ondansetron Injectable 4 milliGRAM(s) IV Push once PRN  oxyCODONE    IR 5 milliGRAM(s) Oral every 6 hours PRN  potassium chloride    Tablet ER 20 milliEquivalent(s) Oral every 2 hours  sodium chloride 0.9%. 500 milliLiter(s) IV Continuous <Continuous>  sodium chloride 0.9%. 1000 milliLiter(s) IV Continuous <Continuous>  spironolactone 100 milliGRAM(s) Oral daily      ROS: 14 point ROS negative unless otherwise stated in subjective    PHYSICAL EXAM:   Vital Signs:  Vital Signs Last 24 Hrs  T(C): 36.6 (12 Jan 2022 09:31), Max: 36.8 (11 Jan 2022 20:21)  T(F): 97.9 (12 Jan 2022 09:31), Max: 98.3 (11 Jan 2022 20:21)  HR: 85 (12 Jan 2022 09:31) (72 - 93)  BP: 121/78 (12 Jan 2022 09:31) (109/63 - 135/71)  BP(mean): --  RR: 18 (12 Jan 2022 09:31) (17 - 18)  SpO2: 98% (12 Jan 2022 09:31) (96% - 98%)  Daily     Daily     GENERAL:  No acute distress  HEENT:  NCAT, - scleral icterus  CHEST: no resp distress  HEART:  RRR  ABDOMEN:  Soft, non-tender, non-distended, normoactive bowel sounds, no masses  EXTREMITIES:  No cyanosis, clubbing, or edema  SKIN:  No rash/erythema/ecchymoses/petechiae/wounds/abscess/warm/dry  NEURO:  Alert and oriented x 3, no asterixis, no tremor    LABS:                        7.8    1.97  )-----------( 89       ( 12 Jan 2022 06:48 )             24.1     Mean Cell Volume: 92.3 fl (01-12-22 @ 06:48)    01-12    138  |  103  |  14  ----------------------------<  76  3.1<L>   |  23  |  0.90    Ca    8.2<L>      12 Jan 2022 06:48    TPro  7.5  /  Alb  2.9<L>  /  TBili  2.9<H>  /  DBili  1.6<H>  /  AST  390<H>  /  ALT  87<H>  /  AlkPhos  81  01-11    LIVER FUNCTIONS - ( 11 Jan 2022 13:19 )  Alb: 2.9 g/dL / Pro: 7.5 g/dL / ALK PHOS: 81 U/L / ALT: 87 U/L / AST: 390 U/L / GGT: x           PT/INR - ( 12 Jan 2022 06:47 )   PT: 17.8 sec;   INR: 1.51 ratio         PTT - ( 12 Jan 2022 06:47 )  PTT:30.9 sec          Imaging:

## 2022-01-12 NOTE — PROGRESS NOTE ADULT - ASSESSMENT
76y Female with pmhx of Hep C cirrhosis, HCC s/p SIRT 11/4/2020, HTN, complaining of RUQ abdominal pain for the last 3 weeks. s/p EGD 1/4/22- PHG, no varices, mild schatzki ring, gastric polyps, duodenal lipoma; plan for Y90 of HCC lesion    #Decompensated HCV cirrhosis (GT1a) c/b ascites, thrombocytopenia  #R lobe HCC in seg 6/7 (dx 08/2020, s/p R lobe SIRT 11/2020 now with recent disease progression)  Dx HCV in 1990's (exposure thought 2/2 blood transfusion in 1988 s/p MCV or exposure to unsterile tx from injection for pain control). She reports taking medication previously, but does not recall the exact treatment. Referred to liver clinic 08/2020- w/u showed fibroscan - 19.9 kPa suggestive of cirrhosis, , MRI and US w/ 7.5 cm lesion in R hepatic lob seg 6/7 c/w HCC (LI-RADS 5). No e/o met disease on bone scans EGD 9/30/2020- Small (< 5 mm) EV, GAVE w/o bleeding, gastric polyps s/p bx, nodule in duodenum s/p bx. C-scope 9/30/2020- Internal hemorrhoids, no specimens collected. s/p IR random liver bx 10/2020 with HVPG 11 mmHg and path w/ chronic HCV and cirrhosis mild steatohepatitis. s/p IR R lobe SIRT 11/2020. F/u MR abd 02/2021 showing decreased areas of internal enhancement c/w SIRT tx. Was lost to f/u a few months after SIRT. 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 Per outpt IR note from Dr. Ramirez, Given size of lesions and that she is not a surgical candidate, recommending radioembolization as palliative treatment.    -Ascites: large volume ascites on CT A/P this admission; s/p para 1/3 with 3.6 L removed  -Varices: EGD 1/4/22- PHG, no varices  -HE: none on exam    #c/f bleeding from HCC lesion- found to have Hgb drop 13 -> 7.5 in 2 months; s/p 1 u pRBC; Hgb stable at ~8 (BL 13); CTA A/P large confluent masses of partially necrotic HCC in R liver lobe w/ liver capsule involvement in seg 7/8, no active contrast extravasate to suggest active bleeding, liver cirrhosis with portal HTN, additional HCC in L hepatic lobe  -1/7- IR hepatic arteriogram, possible bland embolization, y90 Mapping     Recommendations:  - plan for paracentesis today, stable for discharge post procedure from hepatology perspective. Will arrange outpatient follow up  -appreciate IR, palliative care and oncology recs  -would be helpful if pall care could discuss GOC with pt  -c/w diuretics: lasix 40 mg daily + spironolactone 100 mg daily  -plan for Y90 next week by IR    Hepatology will continue to follow.     Elton Young, PGY5  Gastroenterology/Hepatology Fellow  Available on Microsoft Teams  [phone] ([email or web address] Short Range Pager)  [phone] (Long Range Pager)    After 5pm, please contact the on-call GI fellow. [phone]

## 2022-01-12 NOTE — PRE PROCEDURE NOTE - PRE PROCEDURE EVALUATION
Interventional Radiology    HPI: 76 year old Female with a PMH of Hep C cirrhosis, HCC s/p IR embolization, HTN, complaining of RUQ abdominal pain for the last 3 weeks. 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 Patient is planned for paracentesis today.    Allergies:   Medications (Abx/Cardiac/Anticoagulation/Blood Products)      Data:  T(C): 36.9  HR: 99  BP: 117/67  RR: 20  SpO2: 97%    Exam  General: No acute distress  Chest: Non labored breathing  Abdomen: Non-distended  Extremities: No swelling, warm    -WBC 2.53 / HgB 8.0 / Hct 23.8 / Plt 97  -Na 135 / Cl 104 / BUN 30 / Glucose 69  -K 4.0 / CO2 19 / Cr 1.50  -ALT -- / Alk Phos -- / T.Bili --  -INR1.39    Plan: 76y Female presents for paracentesis  -Risks/Benefits/alternatives explained with the patient and/or healthcare proxy and witnessed informed consent obtained.   
Interventional Radiology    HPI: 76y Female with pmhx of Hep C cirrhosis, HCC s/p IR embolization, HTN. Ir consulted for therapeutic paracentesis.  Last para done 1/3, 3.6L removed.      Allergies: NKDA  Medications (Abx/Cardiac/Anticoagulation/Blood Products)    furosemide    Tablet: 40 milliGRAM(s) Oral (01-12 @ 06:01)  furosemide    Tablet: 20 milliGRAM(s) Oral (01-10 @ 17:48)  spironolactone: 25 milliGRAM(s) Oral (01-10 @ 17:47)  spironolactone: 100 milliGRAM(s) Oral (01-12 @ 06:01)    Data:    T(C): 36.8  HR: 86  BP: 120/72  RR: 18  SpO2: 99%    Anemia    Handoff    MEWS Score    Hypertension, unspecified type    Thrombocytopenia    Hepatic cirrhosis, unspecified hepatic cirrhosis type, unspecified whether ascites present    Anemia    Hepatocellular carcinoma    Abdominal pain    Hepatocellular carcinoma    Advance care planning    Palliative care encounter    Vaginal cyst    ENCOUNTER FOR OTHER GENERAL EX    Anemia due to acute blood loss    Thrombocytopenia    Ascites    [email or web address]        Exam  General: No acute distress  Chest: Non labored breathing    -WBC 1.97 / HgB 7.8 / Hct 24.1 / Plt 89  -Na 138 / Cl 103 / BUN 14 / Glucose 76  -K 3.1 / CO2 23 / Cr 0.90  -ALT -- / Alk Phos -- / T.Bili --  -INR1.51    Plan: 76y Female presents for paracentesis.  - Will give 2:1 albumin replacement after discussions with hepatology team.  -Risks/Benefits/alternatives explained with the patient and/or healthcare proxy and witnessed informed consent obtained.   
76y Female with pmhx of Hep C cirrhosis, HCC s/p SIRT 11/4/2020, HTN, complaining of RUQ abdominal pain for the last 3 weeks. Now planned for hepatic arteriogram, possible bland embolization, possible y90 Mapping.
Attending Physician:     Dr. Venegas                       Procedure:   EGD    Indication for Procedure:   HCV decompensating cirrhosis  ________________________________________________________  PAST MEDICAL & SURGICAL HISTORY:    Hypertension, unspecified type  Thrombocytopenia  Hepatic cirrhosis, unspecified hepatic cirrhosis type, unspecified whether ascites present  Vaginal cyst      ALLERGIES:  No Known Allergies    HOME MEDICATIONS:  amLODIPine 10 mg oral tablet: 1 tab(s) orally once a day  lisinopril 10 mg oral tablet: 1 tab(s) orally once a day    AICD/PPM: [ ] yes   [X ] no    PERTINENT LAB DATA:                        7.7    2.17  )-----------( 84       ( 04 Jan 2022 07:25 )             23.2     01-04    136  |  106  |  25<H>  ----------------------------<  78  4.2   |  20<L>  |  1.05    Ca    7.8<L>      04 Jan 2022 07:34  Phos  2.4     01-04  Mg     2.3     01-04  TPro  6.9  /  Alb  3.0<L>  /  TBili  3.0<H>  /  DBili  x   /  AST  337<H>  /  ALT  100<H>  /  AlkPhos  87  01-04  PT/INR - ( 03 Jan 2022 07:06 )   PT: 16.4 sec;   INR: 1.39 ratio      PHYSICAL EXAMINATION:    T(C): 36.8  HR: 86  BP: 114/71  RR: 18  SpO2: 99%    Constitutional: NAD    Neck:  No JVD  Respiratory: CTAB/L  Cardiovascular: S1 and S2  Gastrointestinal: BS+, soft, NT/ND  Extremities: No peripheral edema  Neurological: A/O x 4      COMMENTS:    The patient is a suitable candidate for the planned procedure unless box checked [ ]  No, explain:

## 2022-01-13 NOTE — DISCHARGE NOTE NURSING/CASE MANAGEMENT/SOCIAL WORK - PATIENT PORTAL LINK FT
You can access the FollowMyHealth Patient Portal offered by [facility] by registering at the following website: [email or web address]. By joining [facility]’s FollowMyHealth portal, you will also be able to view your health information using other applications (apps) compatible with our system.

## 2022-01-13 NOTE — PROGRESS NOTE ADULT - ATTENDING COMMENTS
75 y/o F w/ hx of HCV cirrhosis, HCC s/p SIRT 11/4/2020 w/ progression of disease and now tumor in vein, HTN, CKD p/w RUQ abdominal pain.    Per IR and oncology, elevated bilirubin makes repeat liver directed treatment and systemic therapy difficult. Given extent of her HCC and tumor in vein, prognosis is poor.  Review of CT from 1/1/22 (non-contrasted study) concerning for tumor rupture and bleed that would explain her pain on presentation.  Please order CTA abdomen  NPO after MN for IR embolization tmrw of dome lesion to prevent bleeding
77 yo F with HTN and decompensated HCV cirrhosis complicated by ascites and HCC (s/p SIRT 11/2020) but with interval progression of disease now with infiltrative HCC with tumor-in-vein, tentatively planned for outpatient SIRT on 1/22. Planned for discharge home today with diuretics and outpatient follow-up with her hepatologist Dr. Gaytan, IR, and Oncology.
77 y/o F w/ hx of HCV cirrhosis, HCC s/p SIRT 11/4/2020 w/ progression of disease and now tumor in vein, HTN, CKD p/w RUQ abdominal pain.    Per IR and oncology, elevated bilirubin makes repeat liver directed treatment and systemic therapy difficult. Given extent of her HCC and tumor in vein, prognosis is poor.  Review of CT from 1/1/22 (non-contrasted study) concerning for tumor rupture and bleed that would explain her pain on presentation.  Embolization of hepatic artery leading to HCC today and possibly Y90 next week.
77 y/o F w/ hx of HCV cirrhosis, HCC s/p SIRT 11/4/2020 w/ progression of disease and now tumor in vein, HTN, CKD p/w RUQ abdominal pain.    Per IR and oncology, elevated bilirubin makes repeat liver directed treatment and systemic therapy difficult. Given extent of her HCC and tumor in vein, prognosis is poor.  Review of CT from 1/1/22 (non-contrasted study) concerning for tumor rupture and bleed that would explain her pain on presentation.  Y90 mapping done, plan on Y90 next week.
Continuing management of ascites and tentatively planned for repeat SIRT next week for HCC.
Pt with Hep C and HCC: Colt Fisher B/C. Imaging with progression noted, will see if pt candidate for further IR embolization treatment. Will have outpatient follow up with Dr Carreno.
77 yo F with HTN and HCV cirrhosis complicated by HCC (s/p SIRT 11/4/2020), with recent progression of HCC now with evidence of infiltrative disease with tumor-in-vein as well as development of ascites and concern for recent tumor rupture, planned for outpatient palliative SIRT tentatively next week by IR, though liver function tenuous as she has Child-Fisher class C disease. Given abdominal distension and stable renal function, will increase diuretics and would benefit from repeat LVP by IR. She is hesitant to be discharged home to her family prior to SIRT but also says she is not interested in receiving additional services at home through hospice. We discussed the potential role of continued Palliative Care involvement apart from hospice such as to assist with symptomatic management and future GOC planning, but she declined re-consultation when our team discussed that with her today. Prognosis is guarded.    Please don't hesitate to call with any questions or concerns.    Luciana Villanueva M.D., Ph.D.  Transplant Hepatology  Cell: [phone]
77 y/o F w/ hx of HCV cirrhosis, HCC s/p SIRT 11/4/2020 w/ progression of disease and now tumor in vein, HTN, CKD p/w RUQ abdominal pain.    Suspect pain is related to HCC progression of disease.  S/p LVP.  Per IR and oncology, elevated bilirubin makes repeat liver directed treatment and systemic therapy difficult. Given extent of her HCC and tumor in vein, prognosis is poor.  Recommend palliative care consultation for goals of care.
77 yo F with HTN and HCV cirrhosis complicated by HCC (s/p SIRT 11/4/2020), with recent progression of HCC now with evidence of infiltrative disease with tumor-in-vein as well as development of ascites and concern for recent tumor rupture, planned for outpatient palliative SIRT tentatively 1/20 by IR. Started yesterday on furosemide 40 mg po daily and spironolactone 100 mg po daily for management of ascites, and recommend to please re-check BMP with labs tomorrow for enable safe diuretic titration. Recommend therapeutic paracentesis with IR prior to discharge home. Prognosis guarded but she has declined follow-up with Palliative Care and is not interested in hospice referral.    Please don't hesitate to call with any questions or concerns.    Luciana Villanueva M.D., Ph.D.  Transplant Hepatology  Cell: [phone]
Pt was away for test, as per Dr Butler's note, pt with HCC s/p prior IR embolization and will have further evaluation to see if additional IR intervention can be made. She has baseline liver cirrhosis.

## 2022-01-13 NOTE — DISCHARGE NOTE NURSING/CASE MANAGEMENT/SOCIAL WORK - NSDCPEFALRISK_GEN_ALL_CORE
For information on Fall & Injury Prevention, visit: [email or web address] OR  [email or web address] OR  https://www.cdc.gov/steadi/patient.html

## 2022-01-13 NOTE — DISCHARGE NOTE NURSING/CASE MANAGEMENT/SOCIAL WORK - NSDCFUADDAPPT_GEN_ALL_CORE_FT
- IR Booking office number is [phone].  - Call for a consult with  for procedure within 1 week. - Follow up PCP within 2 weeks

## 2022-01-13 NOTE — PROGRESS NOTE ADULT - SUBJECTIVE AND OBJECTIVE BOX
Gastroenterology/Hepatology Progress Note      Interval Events:   - yesterday underwent paracentesis, 2.8L removed, tolerated well  - maintained on diuretics aldactone 100, lasix 40  - no new complaints today    Allergies:  No Known Allergies      Hospital Medications:  furosemide    Tablet 40 milliGRAM(s) Oral daily  influenza  Vaccine (HIGH DOSE) 0.7 milliLiter(s) IntraMuscular once  ondansetron Injectable 4 milliGRAM(s) IV Push once PRN  oxyCODONE    IR 5 milliGRAM(s) Oral every 6 hours PRN  sodium chloride 0.9%. 500 milliLiter(s) IV Continuous <Continuous>  sodium chloride 0.9%. 1000 milliLiter(s) IV Continuous <Continuous>  spironolactone 100 milliGRAM(s) Oral daily      ROS: 14 point ROS negative unless otherwise state in subjective    PHYSICAL EXAM:   Vital Signs:  Vital Signs Last 24 Hrs  T(C): 36.6 (13 Jan 2022 09:16), Max: 36.9 (13 Jan 2022 04:40)  T(F): 97.9 (13 Jan 2022 09:16), Max: 98.4 (13 Jan 2022 04:40)  HR: 100 (13 Jan 2022 09:16) (76 - 100)  BP: 107/73 (13 Jan 2022 09:16) (107/73 - 124/75)  BP(mean): --  RR: 18 (13 Jan 2022 09:16) (18 - 18)  SpO2: 100% (13 Jan 2022 09:16) (96% - 100%)  Daily     Daily     GENERAL:  No acute distress  HEENT:  NCAT, +scleral icterus  CHEST: no resp distress  HEART:  RRR  ABDOMEN:  Soft, non-tender, non-distended, normoactive bowel sounds, no masses  EXTREMITIES:  No cyanosis, clubbing, or edema  SKIN:  No rash/erythema/ecchymoses/petechiae/wounds/abscess/warm/dry  NEURO:  Alert and oriented x 3, no asterixis, no tremor    LABS:                        8.0    1.86  )-----------( 90       ( 13 Jan 2022 07:02 )             24.7     Mean Cell Volume: 93.6 fl (01-13-22 @ 07:02)    01-13    137  |  102  |  12  ----------------------------<  72  3.7   |  26  |  0.94    Ca    8.0<L>      13 Jan 2022 07:02    TPro  6.5  /  Alb  2.7<L>  /  TBili  2.5<H>  /  DBili  x   /  AST  341<H>  /  ALT  82<H>  /  AlkPhos  70  01-13    LIVER FUNCTIONS - ( 13 Jan 2022 07:02 )  Alb: 2.7 g/dL / Pro: 6.5 g/dL / ALK PHOS: 70 U/L / ALT: 82 U/L / AST: 341 U/L / GGT: x           PT/INR - ( 13 Jan 2022 07:02 )   PT: 18.3 sec;   INR: 1.56 ratio         PTT - ( 12 Jan 2022 06:47 )  PTT:30.9 sec          Imaging:

## 2022-01-13 NOTE — DISCHARGE NOTE NURSING/CASE MANAGEMENT/SOCIAL WORK - NSDCVIVACCINE_GEN_ALL_CORE_FT
COVID-19, mRNA, LNP-S, PF, 30 mcg/0.3 mL dose ([employer]); 13-Jan-2022 14:03; Florentin Basilio); [employer]; [id number] (Exp. Date: 15-Mar-2022); IntraMuscular; Deltoid Left.; 0.3 milliLiter(s);

## 2022-01-13 NOTE — PROGRESS NOTE ADULT - PROVIDER SPECIALTY LIST ADULT
Hepatology
Hepatology
Hospitalist
Hospitalist
Intervent Radiology
Nephrology
Heme/Onc
Hepatology
Hospitalist
Nephrology
Heme/Onc
Heme/Onc
Hepatology
Hepatology
Hospitalist
Palliative Care

## 2022-01-13 NOTE — PROGRESS NOTE ADULT - ASSESSMENT
76y Female with pmhx of Hep C cirrhosis, HCC s/p SIRT 11/4/2020, HTN, complaining of RUQ abdominal pain for the last 3 weeks. s/p EGD 1/4/22- PHG, no varices, mild schatzki ring, gastric polyps, duodenal lipoma; plan for Y90 of HCC lesion    #Decompensated HCV cirrhosis (GT1a) c/b ascites, thrombocytopenia  #R lobe HCC in seg 6/7 (dx 08/2020, s/p R lobe SIRT 11/2020 now with recent disease progression)  Dx HCV in 1990's (exposure thought 2/2 blood transfusion in 1988 s/p MCV or exposure to unsterile tx from injection for pain control). She reports taking medication previously, but does not recall the exact treatment. Referred to liver clinic 08/2020- w/u showed fibroscan - 19.9 kPa suggestive of cirrhosis, , MRI and US w/ 7.5 cm lesion in R hepatic lob seg 6/7 c/w HCC (LI-RADS 5). No e/o met disease on bone scans EGD 9/30/2020- Small (< 5 mm) EV, GAVE w/o bleeding, gastric polyps s/p bx, nodule in duodenum s/p bx. C-scope 9/30/2020- Internal hemorrhoids, no specimens collected. s/p IR random liver bx 10/2020 with HVPG 11 mmHg and path w/ chronic HCV and cirrhosis mild steatohepatitis. s/p IR R lobe SIRT 11/2020. F/u MR abd 02/2021 showing decreased areas of internal enhancement c/w SIRT tx. Was lost to f/u a few months after SIRT. 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 Per outpt IR note from Dr. Ramirez, Given size of lesions and that she is not a surgical candidate, recommending radioembolization as palliative treatment.    -Ascites: large volume ascites on CT A/P this admission; s/p para 1/3 with 3.6 L removed  -Varices: EGD 1/4/22- PHG, no varices  -HE: none on exam    #c/f bleeding from HCC lesion- found to have Hgb drop 13 -> 7.5 in 2 months; s/p 1 u pRBC; Hgb stable at ~8 (BL 13); CTA A/P large confluent masses of partially necrotic HCC in R liver lobe w/ liver capsule involvement in seg 7/8, no active contrast extravasate to suggest active bleeding, liver cirrhosis with portal HTN, additional HCC in L hepatic lobe  -1/7- IR hepatic arteriogram, possible bland embolization, y90 Mapping     Recommendations:  -c/w diuretics: lasix 40 mg daily + spironolactone 100 mg daily  -plan for Y90 next week by IR  - stable for discharge from hepatology perspective     Hepatology will continue to follow.     Elton Young, PGY5  Gastroenterology/Hepatology Fellow  Available on [employer] Teams  [phone] ([employer] Short Range Pager)  [phone] (Long Range Pager)    After 5pm, please contact the on-call GI fellow. [phone]

## 2022-01-20 NOTE — ASU DISCHARGE PLAN (ADULT/PEDIATRIC) - ASU DC SPECIAL INSTRUCTIONSFT
Post SIRT Instructions    - You underwent a radioembolization to treat your liver tumor (s) on 1/20 by Dr. Amado    - Monitor right groin site for symptoms of bleeding, hard area underneath the skin, bruising, numbness, intense pain, or inability to move.  If you have any of these symptoms, contact your doctor and seek immediate medical attention    - You may have mild abdominal pain, nausea, loss of appetite, fatigue, and/or low grade fever.  These are normal after your procedure and they should resolve within 3-5 days.  Please call Interventional Radiology if you have a fever > 102.0 F.  If you have persistent nausea, contact IR and we can prescribe anti-nausea medication.     - Please report chills, temperature > 101.0, persistent nausea or vomiting, severe pain, confusion, yellowing of the skin, or abdominal swelling.    - Please refer to the "Radiation Safety Instructions" that were provided.  Please adhere to them for the 72 hours after your procedure.     - Start Medrol dose pack the day after your SIRT procedure (unless otherwise instructed).    - Continue your PPI (Nexium, Prilosec, Protonix) for 30 days post procedure.    - A follow up phone call will be performed the day after the procedure.     - Blood work is to be performed 2 weeks after your procedure (you will be given a paper prescription).  You can locate the closest [facility] lab by calling [phone]. If you have labwork performed at a NON-[facility] lab, please request that the results be faxed to [phone].      - Please call the IR booking department at [phone] to schedule a follow up visit with Dr. Amado in 1 month.     - Follow up with your hepatologist or oncologist in 2 weeks.     - Post procedure imaging study is to be performed 2 months post procedure (CT or MRI).  You will be given a prescription for this at your initial 1 month follow up visit. If needed, our booking office will obtain authorization from your insurance company.    - Please contact the Nurse Practitioner with any questions or concerns directly at [phone] from the hours of 8 am to 6 pm, M-.  After 6 pm and on weekends, please call [phone] and ask to speak with the "Radiology Resident on-call".  When you speak with the resident, let them know what procedure you had and they will get in touch with the Interventional Radiology physician that is on-call. Post SIRT Instructions    - You underwent a radioembolization to treat your liver tumor (s) on 1/20 by Dr. Amado    - Monitor right groin site for symptoms of bleeding, hard area underneath the skin, bruising, numbness, intense pain, or inability to move.  If you have any of these symptoms, contact your doctor and seek immediate medical attention    - You may have mild abdominal pain, nausea, loss of appetite, fatigue, and/or low grade fever.  These are normal after your procedure and they should resolve within 3-5 days.  Please call Interventional Radiology if you have a fever > 102.0 F.  If you have persistent nausea, contact IR and we can prescribe anti-nausea medication.     - Please report chills, temperature > 101.0, persistent nausea or vomiting, severe pain, confusion, yellowing of the skin, or abdominal swelling.    - Please refer to the "Radiation Safety Instructions" that were provided.  Please adhere to them for the 72 hours after your procedure.     - Continue your PPI (Nexium, Prilosec, Protonix) for 30 days post procedure. A new prescription for Protonix was sent to your pharmacy    - A follow up phone call will be performed the day after the procedure.     - Blood work is to be performed 2 weeks after your procedure (you will be given a paper prescription).  You can locate the closest [facility] lab by calling [phone]. If you have labwork performed at a NON-[facility] lab, please request that the results be faxed to [phone].      - Please call the IR booking department at [phone] to schedule a follow up visit with Dr. Amado in 1 month.     - Follow up with your hepatologist or oncologist in 2 weeks.     - Post procedure imaging study is to be performed 2 months post procedure (CT or MRI).  You will be given a prescription for this at your initial 1 month follow up visit. If needed, our booking office will obtain authorization from your insurance company.    - Please contact the Nurse Practitioner with any questions or concerns directly at [phone] from the hours of 8 am to 6 pm, M-Fr.  After 6 pm and on weekends, please call [phone] and ask to speak with the "Radiology Resident on-call".  When you speak with the resident, let them know what procedure you had and they will get in touch with the Interventional Radiology physician that is on-call.    Radiation Safety Discharge Information for Patients Receiving Y-90 Microsphere Treatment    Patients can resume normal contact with adult family members.  Patients should follow the below radiation safety instructions for 72 hours following Yttrium-90 (Y-90) microsphere treatment.   - Patients should follow good personal hygiene such as thorough hand washing after using the toilet, sitting on the toilet when urinating, cleaning up any spills of body fluids such as blood, urine, or stool and disposing of them in the toilet.    - For the next 72 hours (3 days), avoid prolonged close contact with small children or pregnant individuals (less than 3 feet away).  - If you have to see a physician or go to the Emergency Room within 72 hours (3 days) following your treatment, inform them of your Y-90 treatment and that there is a small amount of radioactive material in your liver.  Treatment should not be delayed based on the small amount of radioactviity in your liver.  If there are any questions, medical personnel should contact the IR Nurse Practitioner at [phone].  - You may wish to carry these instructions with you for the first 3 days following your treatment.  After that time, they can be discarded.   - If you have any questions please contact the Radiation Safety Office at [phone].

## 2022-01-20 NOTE — ASU DISCHARGE PLAN (ADULT/PEDIATRIC) - NS MD DC FALL RISK RISK
For information on Fall & Injury Prevention, visit: [email or web address] OR  [email or web address] OR  https://www.cdc.gov/steadi/patient.html

## 2022-01-20 NOTE — PRE PROCEDURE NOTE - NSPROCASSESMENT_GEN_ALL_CORE
Plan: 76y Female presents for SIRT.  1. HCC  - COVID PCR 1/17 not detected  - stat CMP pending  - pt has been taking Protonix since recent hospitalization, but did not take today. Stat dose IV to be given. Refill sent to pharm, reviewed post procedure use  -Risks/Benefits/alternatives explained with the patient and/or healthcare proxy and witnessed informed consent obtained.   - pt with recent hospitalization with TANNER, now resolved.  Reports decrease in PO intake of fluids, c/o dry mouth  - will review renal function.  NS @ 65ml/hr (pt 63.5kg on last admission) for AALIYAH prophylaxis.

## 2022-01-20 NOTE — ASU PATIENT PROFILE, ADULT - FALL HARM RISK - RISK INTERVENTIONS

## 2022-01-20 NOTE — PRE PROCEDURE NOTE - PRE PROCEDURE EVALUATION
Interventional Radiology    HPI: 76y Female with pmhx of Hep C cirrhosis, HCC s/p IR embolization, HTN, ascites, s/p paracentesis on 1/12; Pt presents to IR for SIRT w/ Dr. Amado for palliative therapy for HCC with mapping done on 1/7.    Allergies: NDKA    Data:  T(C): 36.5  HR: 91  BP: 136/81  RR: 18  SpO2: 99%    Exam  General: No acute distress  Chest: Non labored breathing  Abdomen: Non-distended  Extremities: No swelling, warm      Plan: 76y Female presents for SIRT.  -Risks/Benefits/alternatives explained with the patient and/or healthcare proxy and witnessed informed consent obtained.    Interventional Radiology    HPI: 76y Female with pmhx of Hep C cirrhosis, HCC s/p IR embolization, HTN, ascites, s/p paracentesis on 1/12; Pt presents to IR for SIRT w/ Dr. Amado for palliative therapy for HCC with mapping done on 1/7.    Allergies: NDKA    Data:  T(C): 36.5  HR: 91  BP: 136/81  RR: 18  SpO2: 99%    Exam  General: No acute distress  Chest: Non labored breathing  Abdomen: Non-distended  Extremities: No swelling, warm

## 2022-02-23 PROBLEM — R18.8 ASCITES: Status: ACTIVE | Noted: 2022-01-01

## 2022-02-23 PROBLEM — R17 ELEVATED BILIRUBIN: Status: ACTIVE | Noted: 2021-01-01

## 2022-02-23 PROBLEM — Z23 ENCOUNTER FOR IMMUNIZATION: Status: RESOLVED | Noted: 2020-11-09 | Resolved: 2022-01-01

## 2022-02-23 PROBLEM — B19.20 HEPATITIS-C: Status: ACTIVE | Noted: 2020-06-30

## 2022-02-23 PROBLEM — R77.2 HIGH ALPHA FETOPROTEIN (AFP) TUMOR MARKER: Status: ACTIVE | Noted: 2020-08-10

## 2022-03-10 PROBLEM — C22.0 HCC (HEPATOCELLULAR CARCINOMA): Status: ACTIVE | Noted: 2020-08-26

## 2022-03-10 PROBLEM — K74.60 CIRRHOSIS OF LIVER: Status: ACTIVE | Noted: 2020-08-26

## 2022-03-14 ENCOUNTER — NON-APPOINTMENT (OUTPATIENT)
Age: 77
End: 2022-03-14

## 2022-03-14 ENCOUNTER — OUTPATIENT (OUTPATIENT)
Dept: OUTPATIENT SERVICES | Facility: HOSPITAL | Age: 77
LOS: 1 days | Discharge: HOME | End: 2022-03-14

## 2022-03-14 DIAGNOSIS — C22.0 LIVER CELL CARCINOMA: ICD-10-CM

## 2022-03-14 DIAGNOSIS — N89.8 OTHER SPECIFIED NONINFLAMMATORY DISORDERS OF VAGINA: Chronic | ICD-10-CM

## 2022-03-14 LAB
ALBUMIN SERPL ELPH-MCNC: 3.1 G/DL
ALP BLD-CCNC: 80 U/L
ALPHA-1-FETOPROTEIN-L3: 70.1 %
ALPHA-1-FETOPROTEIN: NORMAL NG/ML
ALT SERPL-CCNC: 105 U/L
ANION GAP SERPL CALC-SCNC: 12 MMOL/L
AST SERPL-CCNC: 291 U/L
BASOPHILS # BLD AUTO: 0.03 K/UL
BASOPHILS NFR BLD AUTO: 1.1 %
BILIRUB SERPL-MCNC: 4.8 MG/DL
BUN SERPL-MCNC: 14 MG/DL
CALCIUM SERPL-MCNC: 8.7 MG/DL
CHLORIDE SERPL-SCNC: 99 MMOL/L
CO2 SERPL-SCNC: 22 MMOL/L
CREAT SERPL-MCNC: 1.2 MG/DL
EGFR: 47 ML/MIN/1.73M2
EOSINOPHIL # BLD AUTO: 0.03 K/UL
EOSINOPHIL NFR BLD AUTO: 1.1 %
GLUCOSE SERPL-MCNC: 93 MG/DL
HCT VFR BLD CALC: 28.7 %
HGB BLD-MCNC: 9.4 G/DL
IMM GRANULOCYTES NFR BLD AUTO: 0.4 %
INR PPP: 1.57 RATIO
LYMPHOCYTES # BLD AUTO: 0.3 K/UL
LYMPHOCYTES NFR BLD AUTO: 10.9 %
MAN DIFF?: NORMAL
MCHC RBC-ENTMCNC: 31 PG
MCHC RBC-ENTMCNC: 32.8 GM/DL
MCV RBC AUTO: 94.7 FL
MONOCYTES # BLD AUTO: 0.21 K/UL
MONOCYTES NFR BLD AUTO: 7.7 %
NEUTROPHILS # BLD AUTO: 2.16 K/UL
NEUTROPHILS NFR BLD AUTO: 78.8 %
PLATELET # BLD AUTO: 102 K/UL
POTASSIUM SERPL-SCNC: 4.3 MMOL/L
PROT SERPL-MCNC: 8.1 G/DL
PT BLD: 18.5 SEC
RBC # BLD: 3.03 M/UL
RBC # FLD: 19.4 %
SODIUM SERPL-SCNC: 133 MMOL/L
WBC # FLD AUTO: 2.74 K/UL

## 2022-03-16 ENCOUNTER — APPOINTMENT (OUTPATIENT)
Dept: HEMATOLOGY ONCOLOGY | Facility: CLINIC | Age: 77
End: 2022-03-16

## 2022-05-13 NOTE — PROCEDURE NOTE - NSTOLERANCE_GEN_A_CORE
numerical 0-10
Patient tolerated procedure well.
Breathing spontaneous and unlabored. Breath sounds clear and equal bilaterally with regular rhythm.

## 2024-12-18 NOTE — ED ADULT TRIAGE NOTE - NS ED NURSE BANDS TYPE
Spoke with patient. Patient will  package from [facility] today by 3 pm.     Sending drug, standard supplies for 4 dose of Hizentra delivered subcutaneously  via [employer] . Confirmed patient wants 2400 tubing and 9 mm needle sets. Also patient does not need more Emla at this time.     Patient had no questions for pharmacist.          
Name band;

## (undated) DEVICE — SOL INJ NS 0.9% 500ML 2 PORT

## (undated) DEVICE — TUBING IV SET GRAVITY 3Y 100" MACRO

## (undated) DEVICE — PACK IV START WITH CHG

## (undated) DEVICE — CATH IV SAFE BC 20G X 1.16" (PINK)

## (undated) DEVICE — TUBING SUCTION 20FT

## (undated) DEVICE — TUBING SUCTION CONN 6FT STERILE

## (undated) DEVICE — SUCTION YANKAUER NO CONTROL VENT

## (undated) DEVICE — NDL INJ SCLERO INTERJECT 25G

## (undated) DEVICE — CATH IV SAFE BC 22G X 1" (BLUE)

## (undated) DEVICE — FOLEY HOLDER STATLOCK 2 WAY ADULT

## (undated) DEVICE — SENSOR O2 FINGER ADULT 24/CA

## (undated) DEVICE — FORCEP RADIAL JAW 4 JUMBO 2.8MM 3.2MM 240CM ORANGE DISP

## (undated) DEVICE — SYR ALLIANCE II INFLATION 60ML

## (undated) DEVICE — NDL INJ SCLERO INTERJECT 23G

## (undated) DEVICE — BITE BLOCK ADULT 20 X 27MM (GREEN)

## (undated) DEVICE — BRUSH COLONOSCOPY CYTOLOGY